# Patient Record
Sex: MALE | Race: BLACK OR AFRICAN AMERICAN | NOT HISPANIC OR LATINO | Employment: UNEMPLOYED | URBAN - METROPOLITAN AREA
[De-identification: names, ages, dates, MRNs, and addresses within clinical notes are randomized per-mention and may not be internally consistent; named-entity substitution may affect disease eponyms.]

---

## 2017-01-20 ENCOUNTER — TELEPHONE (OUTPATIENT)
Dept: SLEEP MEDICINE | Facility: CLINIC | Age: 62
End: 2017-01-20

## 2017-01-20 DIAGNOSIS — G47.33 OSA (OBSTRUCTIVE SLEEP APNEA): Primary | ICD-10-CM

## 2017-01-20 NOTE — TELEPHONE ENCOUNTER
Please let patient know in lab study was denied.  We would like to schedule home sleep study.  Patient should expect a call from sleep lab in 7 working days.

## 2017-01-24 ENCOUNTER — TELEPHONE (OUTPATIENT)
Dept: SLEEP MEDICINE | Facility: OTHER | Age: 62
End: 2017-01-24

## 2017-01-24 DIAGNOSIS — G47.33 OSA (OBSTRUCTIVE SLEEP APNEA): Primary | ICD-10-CM

## 2017-02-03 ENCOUNTER — TELEPHONE (OUTPATIENT)
Dept: SLEEP MEDICINE | Facility: OTHER | Age: 62
End: 2017-02-03

## 2017-02-07 ENCOUNTER — TELEPHONE (OUTPATIENT)
Dept: SLEEP MEDICINE | Facility: OTHER | Age: 62
End: 2017-02-07

## 2017-02-14 ENCOUNTER — TELEPHONE (OUTPATIENT)
Dept: SLEEP MEDICINE | Facility: OTHER | Age: 62
End: 2017-02-14

## 2017-03-15 ENCOUNTER — TELEPHONE (OUTPATIENT)
Dept: SLEEP MEDICINE | Facility: OTHER | Age: 62
End: 2017-03-15

## 2017-03-16 ENCOUNTER — HOSPITAL ENCOUNTER (OUTPATIENT)
Dept: SLEEP MEDICINE | Facility: OTHER | Age: 62
Discharge: HOME | End: 2017-03-16
Attending: INTERNAL MEDICINE
Payer: COMMERCIAL

## 2017-03-16 DIAGNOSIS — G47.33 OSA (OBSTRUCTIVE SLEEP APNEA): ICD-10-CM

## 2017-03-16 PROCEDURE — 95800 SLP STDY UNATTENDED: CPT

## 2017-03-16 PROCEDURE — 95800 SLP STDY UNATTENDED: CPT | Mod: 26,52,, | Performed by: INTERNAL MEDICINE

## 2017-03-24 ENCOUNTER — TELEPHONE (OUTPATIENT)
Dept: SLEEP MEDICINE | Facility: CLINIC | Age: 62
End: 2017-03-24

## 2017-03-24 NOTE — TELEPHONE ENCOUNTER
----- Message from Caleb Bautista MD sent at 3/24/2017  1:11 PM CDT -----  Please schedule sleep clinic appointmetnt with md/np in 1-2 weeks to discuss sleep study result.

## 2017-03-24 NOTE — TELEPHONE ENCOUNTER
LVM for pt to call the clinic back to schedule a appointment with Lily Landon to discuss sleep study results

## 2017-04-11 ENCOUNTER — TELEPHONE (OUTPATIENT)
Dept: INTERNAL MEDICINE | Facility: CLINIC | Age: 62
End: 2017-04-11

## 2017-04-11 DIAGNOSIS — I48.0 PAROXYSMAL ATRIAL FIBRILLATION: ICD-10-CM

## 2017-04-11 DIAGNOSIS — D50.9 MICROCYTIC ANEMIA: Primary | ICD-10-CM

## 2017-04-11 DIAGNOSIS — N52.01 ERECTILE DYSFUNCTION DUE TO ARTERIAL INSUFFICIENCY: ICD-10-CM

## 2017-04-11 DIAGNOSIS — I10 ESSENTIAL HYPERTENSION: ICD-10-CM

## 2017-04-11 RX ORDER — ASPIRIN 81 MG/1
81 TABLET ORAL DAILY
Qty: 30 TABLET | Refills: 2 | Status: SHIPPED | OUTPATIENT
Start: 2017-04-11

## 2017-04-12 RX ORDER — SILDENAFIL 100 MG/1
100 TABLET, FILM COATED ORAL DAILY PRN
Qty: 5 TABLET | Refills: 2 | Status: SHIPPED | OUTPATIENT
Start: 2017-04-12 | End: 2017-06-07

## 2017-04-12 RX ORDER — AMLODIPINE BESYLATE 10 MG/1
10 TABLET ORAL DAILY
Qty: 90 TABLET | Refills: 0 | Status: SHIPPED | OUTPATIENT
Start: 2017-04-12 | End: 2017-06-07

## 2017-04-12 NOTE — TELEPHONE ENCOUNTER
No appointment for >1 year. Filled 3 mo supply of meds, needs to be seen prior to additional refills. Please schedule for EPP annual in 2-3 mo, fasting labs 1 week prior.

## 2017-06-07 ENCOUNTER — LAB VISIT (OUTPATIENT)
Dept: LAB | Facility: HOSPITAL | Age: 62
End: 2017-06-07
Attending: INTERNAL MEDICINE
Payer: COMMERCIAL

## 2017-06-07 ENCOUNTER — OFFICE VISIT (OUTPATIENT)
Dept: INTERNAL MEDICINE | Facility: CLINIC | Age: 62
End: 2017-06-07
Payer: COMMERCIAL

## 2017-06-07 VITALS
HEART RATE: 58 BPM | BODY MASS INDEX: 39.61 KG/M2 | WEIGHT: 267.44 LBS | HEIGHT: 69 IN | SYSTOLIC BLOOD PRESSURE: 180 MMHG | DIASTOLIC BLOOD PRESSURE: 120 MMHG

## 2017-06-07 DIAGNOSIS — I10 ESSENTIAL HYPERTENSION: ICD-10-CM

## 2017-06-07 DIAGNOSIS — R06.09 DYSPNEA ON EXERTION: ICD-10-CM

## 2017-06-07 DIAGNOSIS — Z00.00 ANNUAL PHYSICAL EXAM: Primary | ICD-10-CM

## 2017-06-07 DIAGNOSIS — Z23 NEED FOR TDAP VACCINATION: ICD-10-CM

## 2017-06-07 DIAGNOSIS — R35.0 URINARY FREQUENCY: ICD-10-CM

## 2017-06-07 DIAGNOSIS — D50.9 MICROCYTIC ANEMIA: ICD-10-CM

## 2017-06-07 DIAGNOSIS — G47.33 OSA (OBSTRUCTIVE SLEEP APNEA): ICD-10-CM

## 2017-06-07 DIAGNOSIS — I48.0 PAROXYSMAL ATRIAL FIBRILLATION: ICD-10-CM

## 2017-06-07 LAB
ALBUMIN SERPL BCP-MCNC: 3.6 G/DL
ALP SERPL-CCNC: 86 U/L
ALT SERPL W/O P-5'-P-CCNC: 25 U/L
ANION GAP SERPL CALC-SCNC: 8 MMOL/L
AST SERPL-CCNC: 27 U/L
BACTERIA #/AREA URNS AUTO: ABNORMAL /HPF
BILIRUB SERPL-MCNC: 1 MG/DL
BILIRUB UR QL STRIP: NEGATIVE
BUN SERPL-MCNC: 18 MG/DL
CALCIUM SERPL-MCNC: 9.7 MG/DL
CHLORIDE SERPL-SCNC: 105 MMOL/L
CHOLEST/HDLC SERPL: 4.8 {RATIO}
CLARITY UR REFRACT.AUTO: ABNORMAL
CO2 SERPL-SCNC: 28 MMOL/L
COLOR UR AUTO: YELLOW
COMPLEXED PSA SERPL-MCNC: 4.7 NG/ML
CREAT SERPL-MCNC: 1.3 MG/DL
ERYTHROCYTE [DISTWIDTH] IN BLOOD BY AUTOMATED COUNT: 14.8 %
EST. GFR  (AFRICAN AMERICAN): >60 ML/MIN/1.73 M^2
EST. GFR  (NON AFRICAN AMERICAN): 59.7 ML/MIN/1.73 M^2
FERRITIN SERPL-MCNC: 46 NG/ML
GLUCOSE SERPL-MCNC: 73 MG/DL
GLUCOSE UR QL STRIP: NEGATIVE
HCT VFR BLD AUTO: 46.4 %
HDL/CHOLESTEROL RATIO: 20.9 %
HDLC SERPL-MCNC: 211 MG/DL
HDLC SERPL-MCNC: 44 MG/DL
HGB BLD-MCNC: 15.1 G/DL
HGB UR QL STRIP: NEGATIVE
HYALINE CASTS UR QL AUTO: 4 /LPF
IRON SERPL-MCNC: 54 UG/DL
KETONES UR QL STRIP: NEGATIVE
LDLC SERPL CALC-MCNC: 144.2 MG/DL
LEUKOCYTE ESTERASE UR QL STRIP: ABNORMAL
MCH RBC QN AUTO: 24.4 PG
MCHC RBC AUTO-ENTMCNC: 32.5 %
MCV RBC AUTO: 75 FL
MICROSCOPIC COMMENT: ABNORMAL
NITRITE UR QL STRIP: NEGATIVE
NONHDLC SERPL-MCNC: 167 MG/DL
PH UR STRIP: 5 [PH] (ref 5–8)
PLATELET # BLD AUTO: 274 K/UL
PMV BLD AUTO: 9.8 FL
POTASSIUM SERPL-SCNC: 4.6 MMOL/L
PROT SERPL-MCNC: 7.6 G/DL
PROT UR QL STRIP: ABNORMAL
RBC # BLD AUTO: 6.19 M/UL
RBC #/AREA URNS AUTO: 7 /HPF (ref 0–4)
SATURATED IRON: 11 %
SODIUM SERPL-SCNC: 141 MMOL/L
SP GR UR STRIP: 1.02 (ref 1–1.03)
SQUAMOUS #/AREA URNS AUTO: 1 /HPF
TOTAL IRON BINDING CAPACITY: 500 UG/DL
TRANSFERRIN SERPL-MCNC: 338 MG/DL
TRIGL SERPL-MCNC: 114 MG/DL
URN SPEC COLLECT METH UR: ABNORMAL
UROBILINOGEN UR STRIP-ACNC: NEGATIVE EU/DL
WBC # BLD AUTO: 12.44 K/UL
WBC #/AREA URNS AUTO: 35 /HPF (ref 0–5)

## 2017-06-07 PROCEDURE — 90715 TDAP VACCINE 7 YRS/> IM: CPT | Mod: S$GLB,,, | Performed by: INTERNAL MEDICINE

## 2017-06-07 PROCEDURE — 99396 PREV VISIT EST AGE 40-64: CPT | Mod: S$GLB,,, | Performed by: INTERNAL MEDICINE

## 2017-06-07 PROCEDURE — 81001 URINALYSIS AUTO W/SCOPE: CPT

## 2017-06-07 PROCEDURE — 90471 IMMUNIZATION ADMIN: CPT | Mod: S$GLB,,, | Performed by: INTERNAL MEDICINE

## 2017-06-07 PROCEDURE — 83540 ASSAY OF IRON: CPT

## 2017-06-07 PROCEDURE — 80053 COMPREHEN METABOLIC PANEL: CPT

## 2017-06-07 PROCEDURE — 80061 LIPID PANEL: CPT

## 2017-06-07 PROCEDURE — 36415 COLL VENOUS BLD VENIPUNCTURE: CPT

## 2017-06-07 PROCEDURE — 85027 COMPLETE CBC AUTOMATED: CPT

## 2017-06-07 PROCEDURE — 83036 HEMOGLOBIN GLYCOSYLATED A1C: CPT

## 2017-06-07 PROCEDURE — 99999 PR PBB SHADOW E&M-EST. PATIENT-LVL III: CPT | Mod: PBBFAC,,, | Performed by: INTERNAL MEDICINE

## 2017-06-07 PROCEDURE — 84153 ASSAY OF PSA TOTAL: CPT

## 2017-06-07 PROCEDURE — 82728 ASSAY OF FERRITIN: CPT

## 2017-06-07 RX ORDER — LISINOPRIL AND HYDROCHLOROTHIAZIDE 20; 25 MG/1; MG/1
1 TABLET ORAL DAILY
Qty: 90 TABLET | Refills: 3 | Status: ON HOLD | OUTPATIENT
Start: 2017-06-07 | End: 2023-04-02

## 2017-06-07 NOTE — PROGRESS NOTES
"Subjective:       Patient ID: Juvenal Bernal is a 61 y.o. male.    Chief Complaint: Annual Exam    HPI    Last visit with me 12/2015. Since then seen by General Surgery, Cardiology, and Sleep Med.    Reports when laying down or sitting, sometimes the breathing is hard. Sometimes when sitting up. "It feels like I've got to take an extra breath". No wheezing. Sometimes also when going up stairs, sometimes has to stop to rest. No GERD. For about 2 mo. No palpitations. Never had before. No nausea or vomiting, no diaphoresis. No history of asthma. About 1.5 mo ago was coughing a lot, went on for 2 weeks.    Lately goes to bathroom frequently, every 2.5 hrs at night. Going on for about 2 weeks.    Reviewed PMH, PSH, SH, FH, allergies, and medications.     Review of Systems   All other systems reviewed and are negative.      Objective:      Physical Exam   Constitutional: He is oriented to person, place, and time. No distress.   -American man whose Body mass index is 39.49 kg/m².    HENT:   Head: Atraumatic.   Right Ear: Tympanic membrane normal.   Left Ear: Tympanic membrane normal.   Mouth/Throat: Oropharynx is clear and moist. No oropharyngeal exudate.   Eyes: Pupils are equal, round, and reactive to light. Right eye exhibits no discharge. Left eye exhibits no discharge.   Neck: Normal range of motion. No thyromegaly present.   Cardiovascular: Normal rate and normal heart sounds.  An irregularly irregular rhythm present.   Pulmonary/Chest: Effort normal and breath sounds normal. He has no wheezes. He has no rales.   Abdominal: Soft. He exhibits no distension and no mass. There is no hepatosplenomegaly. There is no tenderness. There is no rigidity, no guarding and negative Calvillo's sign.   Musculoskeletal: He exhibits edema (mild pitting edema to knees in b/L LE). He exhibits no tenderness.   Lymphadenopathy:     He has no cervical adenopathy.   Neurological: He is alert and oriented to person, place, and time. " "  Skin: Skin is warm and dry. No rash noted.   Psychiatric: He has a normal mood and affect. His behavior is normal.   Nursing note and vitals reviewed.      Vitals:    06/07/17 1433 06/07/17 1501   BP: (!) 170/106 (!) 180/120   BP Location: Right arm Right arm   Patient Position: Sitting Sitting   BP Method: Manual Manual   Pulse: (!) 58    Weight: 121.3 kg (267 lb 6.7 oz)    Height: 5' 9" (1.753 m)      Body mass index is 39.49 kg/m².    RESULTS: Reviewed labs from last 18 months. Reviewed stress test 12/2015.    Assessment:       1. Annual physical exam    2. Essential hypertension    3. Urinary frequency    4. Dyspnea on exertion    5. Paroxysmal atrial fibrillation    6. MEHNAZ (obstructive sleep apnea)    7. Need for Tdap vaccination        Plan:   Juvenal was seen today for annual exam.    Diagnoses and all orders for this visit:    Annual physical exam:  Age-appropriate health screening reviewed, indicated tests ordered.     Essential hypertension:  Not at goal, very elevated, start combination antihypertensive. follow up with GAYE Duran in 4 weeks.    Urinary frequency:  Given obesity check for diabetes, may also have prostate enlargement, check urine and PSA today, if symptoms persist perform BRENDAN.  -     Urinalysis  -     Prostate Specific Antigen, Diagnostic; Future  -     Hemoglobin A1c; Future    Dyspnea on exertion:  Going on for last 2 months. Pt has had normal stress test within last 18 mo. Not associated with nausea, vomiting, diaphoresis, or chest pain. Denies GERD. Control blood pressure and obstructive sleep apnea, if symptoms persist will perform additional workup.     Paroxysmal atrial fibrillation:  Most recent EKGs showed resolution of atrial fibrillation. Plan to continue ASA for low CHADS-VASC score, however may need to rescore soon to determine if other anticoagulation indicated.    MEHNAZ (obstructive sleep apnea):  Mod-to-severe based on sleep study, pt hadn't been able to schedule with Sleep " Med yet, refer back to Dr Bautista.    Need for Tdap vaccination  -     Tdap Vaccine    Other orders  -     lisinopril-hydrochlorothiazide (PRINZIDE,ZESTORETIC) 20-25 mg Tab; Take 1 tablet by mouth once daily.    Return in about 4 weeks (around 7/5/2017) for Lena Duran for BP check. See me in 2 months for evaluation; if urinary frequency persists at that time assess for BPH.  Randy Stock MD  Internal Medicine    Portions of this note were completed using [employer] dictation software. Please excuse typographical or syntax errors.

## 2017-06-08 LAB
ESTIMATED AVG GLUCOSE: 120 MG/DL
HBA1C MFR BLD HPLC: 5.8 %

## 2017-06-09 ENCOUNTER — PATIENT MESSAGE (OUTPATIENT)
Dept: INTERNAL MEDICINE | Facility: CLINIC | Age: 62
End: 2017-06-09

## 2017-06-09 ENCOUNTER — TELEPHONE (OUTPATIENT)
Dept: INTERNAL MEDICINE | Facility: CLINIC | Age: 62
End: 2017-06-09

## 2017-06-09 DIAGNOSIS — N39.0 URINARY TRACT INFECTION IN MALE: Primary | ICD-10-CM

## 2017-06-09 RX ORDER — SULFAMETHOXAZOLE AND TRIMETHOPRIM 800; 160 MG/1; MG/1
1 TABLET ORAL 2 TIMES DAILY
Qty: 28 TABLET | Refills: 0 | Status: SHIPPED | OUTPATIENT
Start: 2017-06-09 | End: 2017-06-23

## 2017-06-09 NOTE — TELEPHONE ENCOUNTER
----- Message from Blaine Edwards MA sent at 6/9/2017 11:48 AM CDT -----  Contact: self - [phone]  Type: Returning a call    Who left a message? Tanvi     When did the practice call? 6/9/17     Comments: Please call. Thanks!

## 2017-06-09 NOTE — TELEPHONE ENCOUNTER
Please call the patient to notify that I have sent in an antibiotic to his pharmacy to treatment a urinary tract infection. I have sent the patient a [facility] message. I would like to recheck his blood and urine tests in 3 weeks, please schedule.

## 2017-07-04 ENCOUNTER — HOSPITAL ENCOUNTER (EMERGENCY)
Facility: HOSPITAL | Age: 62
Discharge: HOME | End: 2017-07-04
Attending: FAMILY MEDICINE
Payer: COMMERCIAL

## 2017-07-04 VITALS
SYSTOLIC BLOOD PRESSURE: 115 MMHG | DIASTOLIC BLOOD PRESSURE: 80 MMHG | BODY MASS INDEX: 37.08 KG/M2 | HEART RATE: 69 BPM | TEMPERATURE: 99 F | RESPIRATION RATE: 18 BRPM | WEIGHT: 259 LBS | OXYGEN SATURATION: 98 % | HEIGHT: 70 IN

## 2017-07-04 DIAGNOSIS — K13.70 UNSPECIFIED LESIONS OF ORAL MUCOSA: Primary | ICD-10-CM

## 2017-07-04 DIAGNOSIS — K13.79 ORAL PAIN: ICD-10-CM

## 2017-07-04 PROCEDURE — 99283 EMERGENCY DEPT VISIT LOW MDM: CPT | Mod: ,,, | Performed by: PHYSICIAN ASSISTANT

## 2017-07-04 PROCEDURE — 99283 EMERGENCY DEPT VISIT LOW MDM: CPT | Mod: 25

## 2017-07-04 PROCEDURE — 63600175 PHARM REV CODE 636 W HCPCS: Performed by: PHYSICIAN ASSISTANT

## 2017-07-04 PROCEDURE — 96372 THER/PROPH/DIAG INJ SC/IM: CPT

## 2017-07-04 RX ORDER — METHYLPREDNISOLONE 4 MG/1
TABLET ORAL
Qty: 1 PACKAGE | Refills: 0 | Status: SHIPPED | OUTPATIENT
Start: 2017-07-04 | End: 2017-07-25

## 2017-07-04 RX ORDER — CLINDAMYCIN HYDROCHLORIDE 150 MG/1
300 CAPSULE ORAL EVERY 6 HOURS
Qty: 56 CAPSULE | Refills: 0 | Status: SHIPPED | OUTPATIENT
Start: 2017-07-04 | End: 2017-07-11

## 2017-07-04 RX ORDER — DEXAMETHASONE SODIUM PHOSPHATE 4 MG/ML
8 INJECTION, SOLUTION INTRA-ARTICULAR; INTRALESIONAL; INTRAMUSCULAR; INTRAVENOUS; SOFT TISSUE
Status: COMPLETED | OUTPATIENT
Start: 2017-07-04 | End: 2017-07-04

## 2017-07-04 RX ORDER — TRAMADOL HYDROCHLORIDE 50 MG/1
50 TABLET ORAL EVERY 6 HOURS PRN
Qty: 12 TABLET | Refills: 0 | Status: SHIPPED | OUTPATIENT
Start: 2017-07-04 | End: 2017-07-14

## 2017-07-04 RX ORDER — NYSTATIN 100000 [USP'U]/ML
500000 SUSPENSION ORAL 4 TIMES DAILY
Qty: 200 ML | Refills: 0 | Status: SHIPPED | OUTPATIENT
Start: 2017-07-04 | End: 2017-07-14

## 2017-07-04 RX ADMIN — DEXAMETHASONE SODIUM PHOSPHATE 8 MG: 4 INJECTION, SOLUTION INTRAMUSCULAR; INTRAVENOUS at 02:07

## 2017-07-04 NOTE — ED NOTES
Patient identifiers verified and correct for Juvenal KWON Bernal.    LOC: The patient is awake, alert and aware of environment with an appropriate affect, the patient is oriented x 3 and speaking appropriately.  APPEARANCE: Patient resting comfortably and in no acute distress, patient is clean and well groomed, patient's clothing is properly fastened.  SKIN: The skin is warm and dry, color consistent with ethnicity, patient has normal skin turgor, mouth dry with white spots present on tongue, bottom lip reddened and open with swelling.   MUSCULOSKELETAL: Patient moving all extremities spontaneously, no obvious swelling or deformities noted.  RESPIRATORY: Airway is open and patent, respirations are spontaneous, patient has a normal effort and rate, no accessory muscle use noted  CARDIAC: Patient has a normal rate and regular rhythm, no periphreal edema noted, capillary refill < 3 seconds.  ABDOMEN: Soft and non tender to palpation, no distention noted  NEUROLOGIC:  eyes open spontaneously, behavior appropriate to situation, follows commands, facial expression symmetrical, bilateral hand grasp equal and even, purposeful motor response noted

## 2017-07-04 NOTE — ED PROVIDER NOTES
Encounter Date: 7/4/2017       History     Chief Complaint   Patient presents with    Mouth Lesions     my mouth hurts, teeth and gums , on antibiotics     62 y/o male with history of HTN and Afib presents to the ER with chief complaint left upper gumline and tongue pain x 5 days.  He reports sores on his lips as well.  He went to the [place] ER on 7/1 when his symptoms began.  He was treated for suspected gingivostomatitis with Peridex and penicillin.   He is taking these medications without improvement.  He feels that his pain and swelling of the gums are worsening.  His pain is worse with talking. He is not taking any pain medications.  His pain is rated 9/10.  He is having difficulty eating due to the severe pain with chewing and burning pain with smoothies or other soft foods.    He denies difficulty swallowing, nausea or vomiting, fever or chills. The patient is not scheduled to see a dentist or dermatologist.  He denies additional complaint at this time.              Review of patient's allergies indicates:  No Known Allergies  Past Medical History:   Diagnosis Date    Atrial fibrillation     Hypertension      Past Surgical History:   Procedure Laterality Date    TOTAL HIP ARTHROPLASTY Right 2013     No family history on file.  Social History   Substance Use Topics    Smoking status: Never Smoker    Smokeless tobacco: Never Used    Alcohol use No     Review of Systems   Constitutional: Negative for chills and fever.   HENT: Positive for mouth sores. Negative for facial swelling, sore throat, trouble swallowing and voice change.    Respiratory: Negative for shortness of breath.    Cardiovascular: Negative for chest pain.   Gastrointestinal: Negative for nausea and vomiting.   Genitourinary: Negative for dysuria.   Musculoskeletal: Negative for back pain.   Skin: Positive for rash (involving lower lip and oral mucosa.  no additional rash).   Neurological: Negative for dizziness, syncope, weakness and  light-headedness.   Hematological: Does not bruise/bleed easily.       Physical Exam     Initial Vitals [07/04/17 1230]   BP Pulse Resp Temp SpO2   (!) 157/95 80 16 97.9 °F (36.6 °C) 98 %      MAP       115.67         Physical Exam    Constitutional: He appears well-developed and well-nourished.   HENT:   Head: Atraumatic.   Patient has a white film on the sides and center of his tongue, also along the gumline involving upper and lower gums.  He has multiple superficial apthous ulcers and erythema of the gums especially around the posterior molars.  No fluctuance or drainage from the gums.    Eyes: Conjunctivae and EOM are normal. Pupils are equal, round, and reactive to light.   Neck: Normal range of motion. Neck supple.   Cardiovascular: Normal rate and regular rhythm.   Pulmonary/Chest: Breath sounds normal. No respiratory distress. He has no wheezes. He has no rales.   Abdominal: Soft. Bowel sounds are normal. There is no tenderness.   Neurological: He is alert and oriented to person, place, and time. He has normal strength. No cranial nerve deficit.   Skin: Rash noted.   Lower lip with cracked skin and scant amount of bleeding.  No angioedema.     Psychiatric: He has a normal mood and affect.         ED Course   Procedures  Labs Reviewed - No data to display                APC / Resident Notes:   Patient presents to the ER for evaluation of oral lesions ×5 days.  Patient was seen 3 days ago at a different ER and was treated for suspected gingivostomatitis.  It was noted that he had inflammation and pain to the lips, gums, tongue and throat, but there was no evidence of angioedema.  Patient had a negative strep test at that time.  He was treated with Peridex and penicillin without improvement of his symptoms.  Patient has a white film to the tongue and gums as well as multiple lesions that appear to be apthous ulcers.  He has a history of only hypertension and A. fib and is not known to be immunocompromised.  I  have considered autoimmune process as well as fungal infection.  It is also possible that he does have a dental infection but there is no obvious dental abscess.  We will alter his treatment with addition of steroids and nystatin oral solution.  He is given Decadron in the ER and a prescription for Medrol Dosepak, which he will begin tomorrow.  We will change his antibiotics to clindamycin.  His symptoms began prior to starting the penicillin and I do not suspect ALLERGIC reaction such as angioedema or SJS.   Patient is also given tramadol and Magic mouthwash for pain control.  He is given strict return precautions and advised on her close follow-up with his PCP and dermatology clinic.  The patient is comfortable with this plan.I discussed the care of this patient with my supervising MD.                ED Course     Clinical Impression:   The primary encounter diagnosis was Unspecified lesions of oral mucosa. A diagnosis of Oral pain was also pertinent to this visit.                           LIO Underwood  07/04/17 3883

## 2017-07-04 NOTE — ED NOTES
Patient states his mouth hurts. Last Thursday he had a stinging on tongue and lip and his tongue, lip, and gums began to swell. Patient was given mouthwash and 7 day course of PCN on Saturday in [facility]. Patient states he is not getting any relief of his discomfort.  Patient states he has not been able to eat since Thursday bc of mouth discomfort.

## 2017-07-07 ENCOUNTER — OFFICE VISIT (OUTPATIENT)
Dept: INTERNAL MEDICINE | Facility: CLINIC | Age: 62
End: 2017-07-07
Payer: COMMERCIAL

## 2017-07-07 ENCOUNTER — OFFICE VISIT (OUTPATIENT)
Dept: OTOLARYNGOLOGY | Facility: CLINIC | Age: 62
End: 2017-07-07
Payer: COMMERCIAL

## 2017-07-07 VITALS
DIASTOLIC BLOOD PRESSURE: 87 MMHG | HEART RATE: 64 BPM | WEIGHT: 257.06 LBS | BODY MASS INDEX: 37.96 KG/M2 | SYSTOLIC BLOOD PRESSURE: 149 MMHG

## 2017-07-07 VITALS
SYSTOLIC BLOOD PRESSURE: 138 MMHG | DIASTOLIC BLOOD PRESSURE: 88 MMHG | OXYGEN SATURATION: 98 % | HEART RATE: 72 BPM | HEIGHT: 69 IN | WEIGHT: 256.38 LBS | BODY MASS INDEX: 37.97 KG/M2

## 2017-07-07 DIAGNOSIS — K13.79 MOUTH PAIN: Primary | ICD-10-CM

## 2017-07-07 DIAGNOSIS — I10 ESSENTIAL HYPERTENSION: Primary | ICD-10-CM

## 2017-07-07 DIAGNOSIS — I48.0 PAROXYSMAL ATRIAL FIBRILLATION: ICD-10-CM

## 2017-07-07 PROCEDURE — 99213 OFFICE O/P EST LOW 20 MIN: CPT | Mod: S$GLB,,, | Performed by: NURSE PRACTITIONER

## 2017-07-07 PROCEDURE — 99999 PR PBB SHADOW E&M-EST. PATIENT-LVL III: CPT | Mod: PBBFAC,,, | Performed by: NURSE PRACTITIONER

## 2017-07-07 PROCEDURE — 99203 OFFICE O/P NEW LOW 30 MIN: CPT | Mod: S$GLB,,, | Performed by: OTOLARYNGOLOGY

## 2017-07-07 PROCEDURE — 99999 PR PBB SHADOW E&M-EST. PATIENT-LVL II: CPT | Mod: PBBFAC,,, | Performed by: OTOLARYNGOLOGY

## 2017-07-07 NOTE — LETTER
July 7, 2017      David Yates MD  [address]  [place] [address]           [facility]  [address] [facility] [address]  [place] [address]  Phone: [phone]  Fax: [phone]          Patient: Juvenal Bernal   MR Number: [id number]   YOB: 1955   Date of Visit: 7/7/2017       Dear Dr. David Yates:    Thank you for referring Juvenal Bernal to me for evaluation. Attached you will find relevant portions of my assessment and plan of care.    If you have questions, please do not hesitate to call me. I look forward to following Juvenal Bernal along with you.    Sincerely,    Donato Plascencia MD    Enclosure  CC:  No Recipients    If you would like to receive this communication electronically, please contact [email or web address] or [phone] to request more information on [facility] Link access.    For providers and/or their staff who would like to refer a patient to [facility], please contact us through our one-stop-shop provider referral line, [facility] , at [phone].    If you feel you have received this communication in error or would no longer like to receive these types of communications, please e-mail [email or web address]

## 2017-07-07 NOTE — PROGRESS NOTES
Referring Provider:    David Yates Md  [address] [place] [address]  Subjective:   Patient: Juvenal Bernal [id number], :1955   Visit date:2017 1:27 PM    Chief Complaint:  Oral Pain (eval tongue and lesions )    HPI:  Juvenal is a 61 y.o. male who I was asked to see in consultation for evaluation of the following issue(s):     This is the first time I'm seeing Mr. Bernal.  He reports that last Thursday he was at work and had some chemicals on his hands when he touched his lip.  He began having some burning of the lip and then he thinks he may have moisten his lips with his tongue.  After this began having burning on the tongue followed by blistering on the lips.  He was having difficulty swallowing as well as pain throughout the oral cavity.  He initially went to the emergency room on Saturday but did not improve.  He then returned to the emergency room on Tuesday.  He was prescribed clindamycin, Medrol, nystatin, tramadol and Magic mouthwash.  He reports that his symptoms have entirely resolved.  He no longer is having difficulty swallowing.  The lips have dramatically improved with very mild irritation on the left aspect of the lower lip.  He reports that he has never had a similar incident.  He is feeling well at this point.    Review of Systems:  -     Allergic/Immunologic: has No Known Allergies..  -     Constitutional: Current temp:        His meds, allergies, medical, surgical, social & family histories were reviewed & updated:  -     He has a current medication list which includes the following prescription(s): diphenhydramine hcl, aspirin, clindamycin, lisinopril-hydrochlorothiazide, methylprednisolone, nystatin, and tramadol.  -     He  has a past medical history of Atrial fibrillation and Hypertension.   -     He  does not have any pertinent problems on file.   -     He  has a past surgical history that includes Total hip arthroplasty (Right, ).  -     He  reports that he  has never smoked. He has never used smokeless tobacco. He reports that he does not drink alcohol.  -     His family history is not on file.  -     He has No Known Allergies.    Objective:     Physical Exam:  Vitals:  BP (!) 149/87   Pulse 64   Wt 116.6 kg (257 lb 0.9 oz)   BMI 37.96 kg/m²   General appearance:  Well developed, well nourished    Eyes:  Extraocular motions intact, PERRL    Communication:  no hoarseness, no dysphonia    Ears:  Otoscopy of external auditory canals and tympanic membranes was normal, clinical speech reception thresholds grossly intact, no mass/lesion of auricle.  Nose:  No masses/lesions of external nose, nasal mucosa, septum, and turbinates were within normal limits.  Mouth:  No mass/lesion of lips, teeth, gums, hard/soft palate, tongue, tonsils, or oropharynx. Mild irritation of left lower lip with dry skin healing.     Cardiovascular:  No pedal edema; Radial Pulses +2     Neck & Lymphatics:  No cervical lymphadenopathy, no neck mass/crepitus/ asymmetry, trachea is midline, no thyroid enlargement/tenderness/mass.    Psych: Oriented x3,  Alert with normal mood and affect.     Respiration/Chest:  Symmetric expansion during respiration, normal respiratory effort.    Skin:  Warm and intact. No ulcerations of face, scalp, neck.      Assessment & Plan:   Juvenal was seen today for oral pain.    Diagnoses and all orders for this visit:    Mouth pain      Exam is unremarkable at this point.  History consistent with noxious exposure.      We discussed his medical conditions, treatments and plan.  Juvenal should return to clinic if any issues arise (symptoms worsen or persist), otherwise we will see him back in the clinic only as needed.      Thank you for allowing me to participate in the care of Juvenal.    Donato Plascencia MD

## 2017-07-07 NOTE — PROGRESS NOTES
INTERNAL MEDICINE PROGRESS NOTE    CHIEF COMPLAINT     Chief Complaint   Patient presents with    Follow-up     4 wk BP check        HPI     Juvenal Bernal is a 61 y.o. male who presents for a follow up visit today for blood pressure check.   HTN- was started on lisinopril-hctz at last visit with dr. Stock 6/7/2017. BP was 180/120 at that time. Denies headaches, lightheadedness, dizziness. Denies vision changes. Denies SOB and Chest pain.       Past Medical History:  Past Medical History:   Diagnosis Date    Atrial fibrillation     Hypertension        Home Medications:  Prior to Admission medications    Medication Sig Start Date End Date Taking? Authorizing Provider   (Magic mouthwash) 1:1:1 Benadryl 12.5mg/5ml liq, aluminum & magnesium hydroxide-simehticone (Maalox), lidocaine viscous 2% Swish and spit 5 mLs every 4 (four) hours as needed (pain). for mouth sores 7/4/17   LIO Underwood   aspirin (ECOTRIN) 81 MG EC tablet Take 1 tablet (81 mg total) by mouth once daily. 4/11/17   Chris Holliday MD   clindamycin (CLEOCIN) 150 MG capsule Take 2 capsules (300 mg total) by mouth every 6 (six) hours. 7/4/17 7/11/17  LIO Underwood   lisinopril-hydrochlorothiazide (PRINZIDE,ZESTORETIC) 20-25 mg Tab Take 1 tablet by mouth once daily. 6/7/17 7/7/17  Randy Stock MD   methylPREDNISolone (MEDROL DOSEPACK) 4 mg tablet use as directed 7/4/17 7/25/17  LIO Underwood   nystatin (MYCOSTATIN) 100,000 unit/mL suspension Take 5 mLs (500,000 Units total) by mouth 4 (four) times daily. Swish in the mouth and keep in for as long as possible (several minutes) before swallowing 7/4/17 7/14/17  LIO Underwood   tramadol (ULTRAM) 50 mg tablet Take 1 tablet (50 mg total) by mouth every 6 (six) hours as needed. 7/4/17 7/14/17  LIO Underwood       Review of Systems:  Review of Systems   Constitutional: Negative for appetite change, chills, fatigue, fever and unexpected weight change.   Respiratory:  "Negative for cough and shortness of breath.    Cardiovascular: Negative for chest pain, palpitations and leg swelling.   Gastrointestinal: Negative for abdominal pain, constipation, diarrhea, nausea and vomiting.   Neurological: Negative for dizziness, light-headedness and headaches.       Health Maintainence:   Immunizations:  Health Maintenance       Date Due Completion Date    Influenza Vaccine 08/01/2017 ---    Colonoscopy 04/03/2019 4/3/2009    Lipid Panel 06/07/2022 6/7/2017    TETANUS VACCINE 06/07/2027 6/7/2017           PHYSICAL EXAM     /88   Pulse 72   Ht 5' 9" (1.753 m)   Wt 116.3 kg (256 lb 6.3 oz)   SpO2 98%   BMI 37.86 kg/m²     Physical Exam   Constitutional: He is oriented to person, place, and time. He appears well-developed and well-nourished.   HENT:   Head: Normocephalic and atraumatic.   Eyes: Pupils are equal, round, and reactive to light.   Cardiovascular: Normal rate and regular rhythm.    Pulmonary/Chest: Effort normal.   Neurological: He is alert and oriented to person, place, and time.   Psychiatric: He has a normal mood and affect.   Vitals reviewed.      LABS     Lab Results   Component Value Date    HGBA1C 5.8 06/07/2017     CMP  Sodium   Date Value Ref Range Status   06/07/2017 141 136 - 145 mmol/L Final     Potassium   Date Value Ref Range Status   06/07/2017 4.6 3.5 - 5.1 mmol/L Final     Chloride   Date Value Ref Range Status   06/07/2017 105 95 - 110 mmol/L Final     CO2   Date Value Ref Range Status   06/07/2017 28 23 - 29 mmol/L Final     Glucose   Date Value Ref Range Status   06/07/2017 73 70 - 110 mg/dL Final     BUN, Bld   Date Value Ref Range Status   06/07/2017 18 6 - 20 mg/dL Final     Creatinine   Date Value Ref Range Status   06/07/2017 1.3 0.5 - 1.4 mg/dL Final     Calcium   Date Value Ref Range Status   06/07/2017 9.7 8.7 - 10.5 mg/dL Final     Total Protein   Date Value Ref Range Status   06/07/2017 7.6 6.0 - 8.4 g/dL Final     Albumin   Date Value Ref " Range Status   06/07/2017 3.6 3.5 - 5.2 g/dL Final     Total Bilirubin   Date Value Ref Range Status   06/07/2017 1.0 0.1 - 1.0 mg/dL Final     Comment:     For infants and newborns, interpretation of results should be based  on gestational age, weight and in agreement with clinical  observations.  Premature Infant recommended reference ranges:  Up to 24 hours.............<8.0 mg/dL  Up to 48 hours............<12.0 mg/dL  3-5 days..................<15.0 mg/dL  6-29 days.................<15.0 mg/dL       Alkaline Phosphatase   Date Value Ref Range Status   06/07/2017 86 55 - 135 U/L Final     AST   Date Value Ref Range Status   06/07/2017 27 10 - 40 U/L Final     ALT   Date Value Ref Range Status   06/07/2017 25 10 - 44 U/L Final     Anion Gap   Date Value Ref Range Status   06/07/2017 8 8 - 16 mmol/L Final     eGFR if    Date Value Ref Range Status   06/07/2017 >60.0 >60 mL/min/1.73 m^2 Final     eGFR if non    Date Value Ref Range Status   06/07/2017 59.7 (A) >60 mL/min/1.73 m^2 Final     Comment:     Calculation used to obtain the estimated glomerular filtration  rate (eGFR) is the CKD-EPI equation. Since race is unknown   in our information system, the eGFR values for   -American and Non--American patients are given   for each creatinine result.       Lab Results   Component Value Date    WBC 12.44 06/07/2017    HGB 15.1 06/07/2017    HCT 46.4 06/07/2017    MCV 75 (L) 06/07/2017     06/07/2017     Lab Results   Component Value Date    CHOL 211 (H) 06/07/2017    CHOL 169 01/08/2016    CHOL 178 03/13/2009     Lab Results   Component Value Date    HDL 44 06/07/2017    HDL 40 01/08/2016    HDL 37 (L) 03/13/2009     Lab Results   Component Value Date    LDLCALC 144.2 06/07/2017    LDLCALC 117.6 01/08/2016    LDLCALC 121.2 03/13/2009     Lab Results   Component Value Date    TRIG 114 06/07/2017    TRIG 57 01/08/2016    TRIG 99 03/13/2009     Lab Results   Component  Value Date    CHOLHDL 20.9 06/07/2017    CHOLHDL 23.7 01/08/2016    CHOLHDL 20.8 03/13/2009     Lab Results   Component Value Date    TSH 0.837 01/08/2016       ASSESSMENT/PLAN     Juvenal Bernal is a 61 y.o. male with  Past Medical History:   Diagnosis Date    Atrial fibrillation     Hypertension      Essential hypertension- at goal. Cont lisinopril- HCTZ. Discussed low Na diet and exercise. Cont ambulatory BP monitoring and bring log to next visit.     Paroxysmal atrial fibrillation- rhythm regular and rate controlled today.             Follow up with PCP in 4 weeks as scheduled.     Patient education provided from Darrell. Patient was counseled on when and how to seek emergent care.       Oriana MCCARTY, MARCELA, FNP-c   Department of Internal Medicine - [facility]  8:58 AM

## 2017-08-18 ENCOUNTER — OFFICE VISIT (OUTPATIENT)
Dept: INTERNAL MEDICINE | Facility: CLINIC | Age: 62
End: 2017-08-18
Payer: COMMERCIAL

## 2017-08-18 VITALS
SYSTOLIC BLOOD PRESSURE: 134 MMHG | HEIGHT: 70 IN | BODY MASS INDEX: 37.43 KG/M2 | WEIGHT: 261.44 LBS | DIASTOLIC BLOOD PRESSURE: 86 MMHG | HEART RATE: 74 BPM

## 2017-08-18 DIAGNOSIS — I10 ESSENTIAL HYPERTENSION: Primary | ICD-10-CM

## 2017-08-18 DIAGNOSIS — N52.9 ERECTILE DYSFUNCTION, UNSPECIFIED ERECTILE DYSFUNCTION TYPE: ICD-10-CM

## 2017-08-18 DIAGNOSIS — R97.20 ELEVATED PSA, LESS THAN 10 NG/ML: ICD-10-CM

## 2017-08-18 DIAGNOSIS — R39.11 URINARY HESITANCY: ICD-10-CM

## 2017-08-18 DIAGNOSIS — M79.644 PAIN OF FINGER OF RIGHT HAND: ICD-10-CM

## 2017-08-18 DIAGNOSIS — G47.33 OSA (OBSTRUCTIVE SLEEP APNEA): ICD-10-CM

## 2017-08-18 LAB
BACTERIA #/AREA URNS AUTO: ABNORMAL /HPF
BILIRUB UR QL STRIP: NEGATIVE
CLARITY UR REFRACT.AUTO: ABNORMAL
COLOR UR AUTO: YELLOW
GLUCOSE UR QL STRIP: NEGATIVE
HGB UR QL STRIP: NEGATIVE
KETONES UR QL STRIP: NEGATIVE
LEUKOCYTE ESTERASE UR QL STRIP: ABNORMAL
MICROSCOPIC COMMENT: ABNORMAL
NITRITE UR QL STRIP: NEGATIVE
PH UR STRIP: 5 [PH] (ref 5–8)
PROT UR QL STRIP: NEGATIVE
RBC #/AREA URNS AUTO: 1 /HPF (ref 0–4)
SP GR UR STRIP: 1.02 (ref 1–1.03)
SQUAMOUS #/AREA URNS AUTO: 0 /HPF
URN SPEC COLLECT METH UR: ABNORMAL
UROBILINOGEN UR STRIP-ACNC: NEGATIVE EU/DL
WBC #/AREA URNS AUTO: 16 /HPF (ref 0–5)

## 2017-08-18 PROCEDURE — 3079F DIAST BP 80-89 MM HG: CPT | Mod: S$GLB,,, | Performed by: INTERNAL MEDICINE

## 2017-08-18 PROCEDURE — 87088 URINE BACTERIA CULTURE: CPT

## 2017-08-18 PROCEDURE — 99999 PR PBB SHADOW E&M-EST. PATIENT-LVL III: CPT | Mod: PBBFAC,,, | Performed by: INTERNAL MEDICINE

## 2017-08-18 PROCEDURE — 99214 OFFICE O/P EST MOD 30 MIN: CPT | Mod: S$GLB,,, | Performed by: INTERNAL MEDICINE

## 2017-08-18 PROCEDURE — 87086 URINE CULTURE/COLONY COUNT: CPT

## 2017-08-18 PROCEDURE — 87077 CULTURE AEROBIC IDENTIFY: CPT

## 2017-08-18 PROCEDURE — 3008F BODY MASS INDEX DOCD: CPT | Mod: S$GLB,,, | Performed by: INTERNAL MEDICINE

## 2017-08-18 PROCEDURE — 81001 URINALYSIS AUTO W/SCOPE: CPT

## 2017-08-18 PROCEDURE — 87186 SC STD MICRODIL/AGAR DIL: CPT

## 2017-08-18 PROCEDURE — 3075F SYST BP GE 130 - 139MM HG: CPT | Mod: S$GLB,,, | Performed by: INTERNAL MEDICINE

## 2017-08-18 RX ORDER — SILDENAFIL 100 MG/1
100 TABLET, FILM COATED ORAL DAILY PRN
Qty: 4 TABLET | Refills: 11 | Status: ON HOLD | OUTPATIENT
Start: 2017-08-18 | End: 2023-04-02

## 2017-08-18 NOTE — PROGRESS NOTES
"Subjective:       Patient ID: Juvenal Bernal is a 62 y.o. male.    Chief Complaint: Follow-up    HPI    Patient is accompanied by wife, Sol.    Last visit with me 06/2017. Since then seen in ER for mouth pain, also seen by Internal Medicine and Otolaryngology. Had symptoms consistent with prostatitis, started Bactrim for 2 weeks.    Still with some urinary hesitancy. No dysuria. No foul odor. Urinary frequency has subsided, usually once/night. No fever or chills.     Continues to have symptoms of erectile dysfunction. Started after having hip surgery.  Concern for arterial changes resulting from hip surgery.    occasionally with pain in index finger MCP joint right hand, usually after working with tools. Denies trauma. Pain lasts 10-15 min then resolves.    Review of Systems    As per [facility]    Objective:      Physical Exam   Constitutional: He is oriented to person, place, and time. No distress.   -American man whose Body mass index is 37.52 kg/m².    Cardiovascular: Normal rate, regular rhythm and normal heart sounds.    Pulmonary/Chest: Effort normal and breath sounds normal. He has no wheezes. He has no rales.   Musculoskeletal:   No tenderness to palpation, effusion, or decreased range of motion of MCP index finger right hand.   Neurological: He is alert and oriented to person, place, and time.   Nursing note and vitals reviewed.      Vitals:    08/18/17 0927   BP: 134/86   BP Location: Right arm   Patient Position: Sitting   BP Method: Large (Manual)   Pulse: 74   Weight: 118.6 kg (261 lb 7.5 oz)   Height: 5' 10" (1.778 m)     Body mass index is 37.52 kg/m².    RESULTS: Reviewed labs from last 3 months    Assessment:       1. Essential hypertension    2. Elevated PSA, less than 10 ng/ml    3. Urinary hesitancy    4. MEHNAZ (obstructive sleep apnea)    5. Erectile dysfunction, unspecified erectile dysfunction type    6. Pain of finger of right hand        Plan:   Juvenal was seen today for " follow-up.    Diagnoses and all orders for this visit:    Essential hypertension:  Prior diagnosis, well controlled on current management. No changes at this time, will continue to monitor.   -     Basic metabolic panel; Future    Elevated PSA, less than 10 ng/ml:  Recheck levels, possibly due to prostatitis, will assess for resolution.  -     Prostate Specific Antigen, Diagnostic; Future    Urinary hesitancy:  possibly BPH or prostatitis, assess with blood and urine tests, refer to Urology.  -     Urinalysis  -     Urine culture    MEHNAZ (obstructive sleep apnea):  Needs follow up with Sleep Medicine, ask schedulers to make appointment, or can call on his own.    Erectile dysfunction, unspecified erectile dysfunction type:  possibly arterial insufficiency following surgery, trial of Viagra, counseled about side effects.  Refer to Urology.  -     Testosterone; Future  -     sildenafil (VIAGRA) 100 MG tablet; Take 1 tablet (100 mg total) by mouth daily as needed for Erectile Dysfunction.  -     Ambulatory referral to Urology    Pain of finger of right hand:  osteoarthritis vs tendinitis MCP right index finger, encourage ice and NSAIDs PRN.    Return in about 6 months (around 2/18/2018). Labs and urine today.  Randy Stock MD  Internal Medicine    Portions of this note were completed using [employer] dictation software. Please excuse typographical or syntax errors.

## 2017-08-21 ENCOUNTER — PATIENT MESSAGE (OUTPATIENT)
Dept: INTERNAL MEDICINE | Facility: CLINIC | Age: 62
End: 2017-08-21

## 2017-08-21 DIAGNOSIS — N39.0 URINARY TRACT INFECTION WITHOUT HEMATURIA, SITE UNSPECIFIED: Primary | ICD-10-CM

## 2017-08-21 LAB — BACTERIA UR CULT: NORMAL

## 2017-08-21 RX ORDER — CIPROFLOXACIN 500 MG/1
500 TABLET ORAL 2 TIMES DAILY
Qty: 14 TABLET | Refills: 0 | Status: SHIPPED | OUTPATIENT
Start: 2017-08-21 | End: 2017-08-28

## 2017-08-21 NOTE — TELEPHONE ENCOUNTER
Please call the patient to notify that his urine tests show a urinary tract infection. I am sending in an antibiotic to his pharmacy. Take 1 tab two times a day for 1 week. There are other tests pending on the urine sample, if we need to make any changes I will let him know. All other labs are normal. I have sent the patient a [facility] message.

## 2017-08-24 ENCOUNTER — TELEPHONE (OUTPATIENT)
Dept: INTERNAL MEDICINE | Facility: CLINIC | Age: 62
End: 2017-08-24

## 2017-08-24 NOTE — TELEPHONE ENCOUNTER
----- Message from Josy Torres sent at 8/23/2017  2:18 PM CDT -----  Contact: Self/ [phone]   Type: Returning a call     Who left a message? Tanvi    When did the practice call? Yesterday     Comments: pt stated he miss a call from the office. Pt stated he someone can call him in the morning around 9. Please call and advise     Thank you

## 2017-09-22 ENCOUNTER — OFFICE VISIT (OUTPATIENT)
Dept: UROLOGY | Facility: CLINIC | Age: 62
End: 2017-09-22
Payer: COMMERCIAL

## 2017-09-22 VITALS
BODY MASS INDEX: 37.37 KG/M2 | DIASTOLIC BLOOD PRESSURE: 68 MMHG | WEIGHT: 261 LBS | HEART RATE: 71 BPM | HEIGHT: 70 IN | SYSTOLIC BLOOD PRESSURE: 137 MMHG

## 2017-09-22 DIAGNOSIS — R97.20 ELEVATED PSA: ICD-10-CM

## 2017-09-22 DIAGNOSIS — N20.0 KIDNEY STONE: ICD-10-CM

## 2017-09-22 DIAGNOSIS — N30.00 ACUTE CYSTITIS WITHOUT HEMATURIA: ICD-10-CM

## 2017-09-22 DIAGNOSIS — N52.01 ERECTILE DYSFUNCTION DUE TO ARTERIAL INSUFFICIENCY: Primary | ICD-10-CM

## 2017-09-22 PROBLEM — N39.0 UTI (URINARY TRACT INFECTION): Status: ACTIVE | Noted: 2017-09-22

## 2017-09-22 LAB
BILIRUB SERPL-MCNC: NORMAL MG/DL
BLOOD URINE, POC: NORMAL
COLOR, POC UA: YELLOW
GLUCOSE UR QL STRIP: NORMAL
KETONES UR QL STRIP: NORMAL
LEUKOCYTE ESTERASE URINE, POC: NORMAL
NITRITE, POC UA: NORMAL
PH, POC UA: 5
PROTEIN, POC: NORMAL
SPECIFIC GRAVITY, POC UA: 1.02
UROBILINOGEN, POC UA: NORMAL

## 2017-09-22 PROCEDURE — 87086 URINE CULTURE/COLONY COUNT: CPT

## 2017-09-22 PROCEDURE — 99999 PR PBB SHADOW E&M-EST. PATIENT-LVL IV: CPT | Mod: PBBFAC,,, | Performed by: UROLOGY

## 2017-09-22 PROCEDURE — 99243 OFF/OP CNSLTJ NEW/EST LOW 30: CPT | Mod: 25,S$GLB,, | Performed by: UROLOGY

## 2017-09-22 PROCEDURE — 81002 URINALYSIS NONAUTO W/O SCOPE: CPT | Mod: S$GLB,,, | Performed by: UROLOGY

## 2017-09-22 RX ORDER — CIPROFLOXACIN 250 MG/1
500 TABLET, FILM COATED ORAL ONCE
Status: CANCELLED | OUTPATIENT
Start: 2017-09-22 | End: 2017-09-22

## 2017-09-22 RX ORDER — LIDOCAINE HYDROCHLORIDE 20 MG/ML
JELLY TOPICAL ONCE
Status: CANCELLED | OUTPATIENT
Start: 2017-09-22 | End: 2017-09-22

## 2017-09-22 NOTE — LETTER
September 22, 2017      Randy Stock MD  [address]  [place] [address]           [facility] - Urology 4th Floor  [address]  [place] [address]  Phone: [phone]          Patient: Juvenal Bernal   MR Number: [id number]   YOB: 1955   Date of Visit: 9/22/2017       Dear Dr. Randy Stock:    Thank you for referring Juvenal Bernal to me for evaluation. Attached you will find relevant portions of my assessment and plan of care.    If you have questions, please do not hesitate to call me. I look forward to following Juvenal Bernal along with you.    Sincerely,    Chava Jama MD    Enclosure  CC:  No Recipients    If you would like to receive this communication electronically, please contact [email or web address] or [phone] to request more information on [employer] access.    For providers and/or their staff who would like to refer a patient to [facility], please contact us through our one-stop-shop provider referral line, [employer] , at [phone].    If you feel you have received this communication in error or would no longer like to receive these types of communications, please e-mail [email or web address]

## 2017-09-22 NOTE — PROGRESS NOTES
CC: recurrent UTI    Juvenal Bernal is a 62 y.o. man who is here for the evaluation of Elevated PSA (elevated psa with recent UTI); Urinary Tract Infection (patient is circumsized -states he had a recent UTI -was asymptomatic); Nephrolithiasis (history of stones about 4 years ago); and Erectile Dysfunction (viagra prn )  a new pt referred by his PCP Dr. Beto Stock.  Had UTI back in  and another one in 17 with citrobacter.  He was treated with cipor twice and responded well.    Hx of kidney stone in the past, he was able to pass a stone spontaneously.  Denies any problem with urination.  Reports good flow and complete bladder emptying.  Had circumcision done.  No family hx of prostate cancer.    His PSA was elevated to 4.7 in  but got better to 2.7 in 2017.    Denies flank pain, dysuira, hematuria .      Past Medical History:   Diagnosis Date    Atrial fibrillation     Hypertension      Past Surgical History:   Procedure Laterality Date    TOTAL HIP ARTHROPLASTY Right 2013     Social History   Substance Use Topics    Smoking status: Never Smoker    Smokeless tobacco: Never Used    Alcohol use No     History reviewed. No pertinent family history.  Allergy:  Review of patient's allergies indicates:  No Known Allergies  Outpatient Encounter Prescriptions as of 2017   Medication Sig Dispense Refill    aspirin (ECOTRIN) 81 MG EC tablet Take 1 tablet (81 mg total) by mouth once daily. 30 tablet 2    sildenafil (VIAGRA) 100 MG tablet Take 1 tablet (100 mg total) by mouth daily as needed for Erectile Dysfunction. 4 tablet 11    [] ciprofloxacin HCl (CIPRO) 500 MG tablet Take 1 tablet (500 mg total) by mouth 2 (two) times daily. 14 tablet 0    lisinopril-hydrochlorothiazide (PRINZIDE,ZESTORETIC) 20-25 mg Tab Take 1 tablet by mouth once daily. 90 tablet 3    [DISCONTINUED] (Magic mouthwash) 1:1:1 Benadryl 12.5mg/5ml liq, aluminum & magnesium hydroxide-simehticone (Maalox), lidocaine  viscous 2% Swish and spit 5 mLs every 4 (four) hours as needed (pain). for mouth sores 90 mL 0     No facility-administered encounter medications on file as of 9/22/2017.      Review of Systems   General ROS: GENERAL:  No weight gain or loss  SKIN:  No rashes or lacerations  HEAD:  No headaches  EYES:  No exophthalmos, jaundice or blindness  EARS:  No dizziness, tinnitus or hearing loss  NOSE:  No changes in smell  MOUTH & THROAT:  No dyskinetic movements or obvious goiter  CHEST:  No shortness of breath, hyperventilation or cough  CARDIOVASCULAR:  No tachycardia or chest pain  ABDOMEN:  No nausea, vomiting, pain, constipation or diarrhea  URINARY:  No frequency, dysuria or sexual dysfunction  ENDOCRINE:  No polydipsia, polyuria  MUSCULOSKELETAL:  No pain or stiffness of the joints  NEUROLOGIC:  No weakness, sensory changes, seizures, confusion, memory loss, tremor or other abnormal movements  Physical Exam     Vitals:    09/22/17 0824   BP: 137/68   Pulse: 71     Physical Exam   Constitutional: He is oriented to person, place, and time. He appears well-developed and well-nourished. No distress.   HENT:   Head: Normocephalic and atraumatic.   Right Ear: External ear normal.   Left Ear: External ear normal.   Nose: Nose normal.   Mouth/Throat: Oropharynx is clear and moist.   Eyes: Conjunctivae are normal. Pupils are equal, round, and reactive to light.   Neck: Normal range of motion. Neck supple. No JVD present. No tracheal deviation present. No thyromegaly present.   Cardiovascular: Normal rate, regular rhythm, normal heart sounds and intact distal pulses.  Exam reveals no gallop and no friction rub.    No murmur heard.  Pulmonary/Chest: Effort normal and breath sounds normal. No respiratory distress. He has no wheezes. He exhibits no tenderness.   Abdominal: Soft. Bowel sounds are normal. He exhibits no distension and no mass. There is no tenderness. There is no rebound and no guarding.   Genitourinary: Rectum  normal and penis normal. No penile tenderness.   Genitourinary Comments: Prostate 35 grams with negative nodule or negative tenderness     Musculoskeletal: Normal range of motion. He exhibits no edema, tenderness or deformity.   Lymphadenopathy:     He has no cervical adenopathy.   Neurological: He is alert and oriented to person, place, and time.   Skin: Skin is warm and dry. He is not diaphoretic.     Psychiatric: He has a normal mood and affect. His behavior is normal. Thought content normal.     Genitalia:  Scrotum: no rash or lesion  Normal symmetric epididymis without masses  Normal vas palpated  Normal size, symmetric testicles with no masses   Normal urethral meatus with no discharge  Normal circumcised penis with no lesion   Rectal:  Normal perineum and anus upon inspection.  Normal tone, no masses or tenderness;     LABS:  Lab Results   Component Value Date    PSA 2.7 01/08/2016    PSA 0.57 03/13/2009    PSADIAG 2.7 08/18/2017    PSADIAG 4.7 (H) 06/07/2017     Results for orders placed or performed in visit on 08/18/17   Prostate Specific Antigen, Diagnostic   Result Value Ref Range    PSA DIAGNOSTIC 2.7 0.00 - 4.00 ng/mL   Results for orders placed or performed in visit on 06/07/17   Prostate Specific Antigen, Diagnostic   Result Value Ref Range    PSA DIAGNOSTIC 4.7 (H) 0.00 - 4.00 ng/mL     Lab Results   Component Value Date    CREATININE 1.1 08/18/2017    CREATININE 1.3 06/07/2017    CREATININE 1.1 04/02/2016     Results for orders placed or performed in visit on 08/18/17   Testosterone   Result Value Ref Range    Testosterone, Total 324 195.0 - 1138.0 ng/dL     Urine Culture, Routine   Date Value Ref Range Status   08/18/2017 CITROBACTER KOSERI  >100,000 cfu/ml    Final     UA clear today    Assessment and Plan:  Juvenal was seen today for elevated psa, urinary tract infection, nephrolithiasis and erectile dysfunction.    Diagnoses and all orders for this visit:    Erectile dysfunction due to arterial  insufficiency    Acute cystitis without hematuria  -     Cystoscopy; Future  -     POCT urine dipstick without microscope  -     Urine culture    Elevated PSA    Kidney stone  -     X-Ray Abdomen AP 1 View; Future  -     US Retroperitoneal Limited; Future    Other orders  -     lidocaine HCl 2% urojet; Place into the urethra once.  -     ciprofloxacin HCl tablet 500 mg; Take 2 tablets (500 mg total) by mouth once.    will evaluate his urinary tract system as above.  Will continue to follow up with serial PSA.  His elevated PSA seems related to UTI.    Follow-up:  Return for cysto.

## 2017-09-23 LAB — BACTERIA UR CULT: NO GROWTH

## 2017-10-10 ENCOUNTER — HOSPITAL ENCOUNTER (OUTPATIENT)
Dept: RADIOLOGY | Facility: HOSPITAL | Age: 62
Discharge: HOME | End: 2017-10-10
Attending: UROLOGY
Payer: COMMERCIAL

## 2017-10-10 ENCOUNTER — TELEPHONE (OUTPATIENT)
Dept: UROLOGY | Facility: CLINIC | Age: 62
End: 2017-10-10

## 2017-10-10 ENCOUNTER — HOSPITAL ENCOUNTER (OUTPATIENT)
Dept: UROLOGY | Facility: HOSPITAL | Age: 62
Discharge: HOME | End: 2017-10-10
Attending: UROLOGY
Payer: COMMERCIAL

## 2017-10-10 VITALS
HEART RATE: 65 BPM | HEIGHT: 70 IN | TEMPERATURE: 98 F | SYSTOLIC BLOOD PRESSURE: 152 MMHG | DIASTOLIC BLOOD PRESSURE: 101 MMHG | BODY MASS INDEX: 38.44 KG/M2 | RESPIRATION RATE: 20 BRPM | WEIGHT: 268.5 LBS

## 2017-10-10 DIAGNOSIS — N40.1 BPH WITH URINARY OBSTRUCTION: ICD-10-CM

## 2017-10-10 DIAGNOSIS — N39.0 URINARY TRACT INFECTION WITHOUT HEMATURIA, SITE UNSPECIFIED: ICD-10-CM

## 2017-10-10 DIAGNOSIS — N30.00 ACUTE CYSTITIS WITHOUT HEMATURIA: ICD-10-CM

## 2017-10-10 DIAGNOSIS — N13.8 BPH WITH URINARY OBSTRUCTION: ICD-10-CM

## 2017-10-10 DIAGNOSIS — N20.0 KIDNEY STONE: ICD-10-CM

## 2017-10-10 DIAGNOSIS — N20.0 CALCIUM KIDNEY STONE: Primary | ICD-10-CM

## 2017-10-10 DIAGNOSIS — N20.0 KIDNEY STONE: Primary | ICD-10-CM

## 2017-10-10 PROCEDURE — 74000 XR ABDOMEN AP 1 VIEW: CPT | Mod: 26,,, | Performed by: RADIOLOGY

## 2017-10-10 PROCEDURE — 52000 CYSTOURETHROSCOPY: CPT

## 2017-10-10 PROCEDURE — 76775 US EXAM ABDO BACK WALL LIM: CPT | Mod: TC

## 2017-10-10 PROCEDURE — 74000 XR ABDOMEN AP 1 VIEW: CPT | Mod: TC

## 2017-10-10 PROCEDURE — 76775 US EXAM ABDO BACK WALL LIM: CPT | Mod: 26,,, | Performed by: RADIOLOGY

## 2017-10-10 PROCEDURE — 87086 URINE CULTURE/COLONY COUNT: CPT

## 2017-10-10 PROCEDURE — 52000 CYSTOURETHROSCOPY: CPT | Mod: ,,, | Performed by: UROLOGY

## 2017-10-10 RX ORDER — DUTASTERIDE 0.5 MG/1
0.5 CAPSULE, LIQUID FILLED ORAL DAILY
Qty: 30 CAPSULE | Refills: 11 | Status: ON HOLD | OUTPATIENT
Start: 2017-10-10 | End: 2023-04-02

## 2017-10-10 RX ORDER — LIDOCAINE HYDROCHLORIDE 20 MG/ML
JELLY TOPICAL ONCE
Status: COMPLETED | OUTPATIENT
Start: 2017-10-10 | End: 2017-10-10

## 2017-10-10 RX ORDER — TAMSULOSIN HYDROCHLORIDE 0.4 MG/1
0.4 CAPSULE ORAL NIGHTLY
Qty: 30 CAPSULE | Refills: 11 | Status: SHIPPED | OUTPATIENT
Start: 2017-10-10 | End: 2017-12-22

## 2017-10-10 RX ORDER — TAMSULOSIN HYDROCHLORIDE 0.4 MG/1
0.4 CAPSULE ORAL NIGHTLY
Qty: 30 CAPSULE | Refills: 11 | Status: SHIPPED | OUTPATIENT
Start: 2017-10-10 | End: 2017-10-10 | Stop reason: SDUPTHER

## 2017-10-10 RX ORDER — DUTASTERIDE 0.5 MG/1
0.5 CAPSULE, LIQUID FILLED ORAL DAILY
Qty: 30 CAPSULE | Refills: 11 | Status: SHIPPED | OUTPATIENT
Start: 2017-10-10 | End: 2017-10-10 | Stop reason: SDUPTHER

## 2017-10-10 RX ORDER — CIPROFLOXACIN 500 MG/1
500 TABLET ORAL ONCE
Status: COMPLETED | OUTPATIENT
Start: 2017-10-10 | End: 2017-10-10

## 2017-10-10 RX ADMIN — CIPROFLOXACIN 500 MG: 500 TABLET ORAL at 11:10

## 2017-10-10 RX ADMIN — LIDOCAINE HYDROCHLORIDE: 20 JELLY TOPICAL at 11:10

## 2017-10-10 NOTE — INTERVAL H&P NOTE
The patient has been examined and the H&P has been reviewed:    I concur with the findings and no changes have occurred since H&P was written.  US  Right-sided nonobstructive nephrolithiasis.    Bilateral renal cysts.    Slightly elevated arterial resistive indices, nonspecific.    Hx of recurrent UTI      There are no hospital problems to display for this patient.

## 2017-10-10 NOTE — PATIENT INSTRUCTIONS
What to Expect After a Cystoscopy  For the next 24-48 hours, you may feel a mild burning when you urinate. This burning is normal and expected. Drink plenty of water to dilute the urine to help relieve the burning sensation. You may also see a small amount of blood in your urine after the procedure.    Unless you are already taking antibiotics, you may be given an antibiotic after the test to prevent infection.    Signs and Symptoms to Report  Call the [facility] at [phone] if you develop any of the following:  · Fever of 101 degrees or higher  · Chills or persistent bleeding  · Inability to urinate

## 2017-10-10 NOTE — PROCEDURES
Procedure Date:  10/10/2017      Procedure:  Male Diagnostic Cystourethroscopy    Pre-op diagnosis: recurrent UTI, BPH with obstruction  Post-op diagnosis: same  Anesthesia: Local  Surgeon:  Chava Jama MD    Findings:  Urethra:  Normal urethra.   Sphincter: competent.  Prostate: Estimated Length Prostatic Urethra: 4.5 cm with bilateral lateral obstruction  Bladder neck: patent with no stricture  Bladder:  Normal bladder.   Normal ureteral orifices bilaterally.   Moderate trabeculation.     Description of Procedure:                                                         Informed Consent:                                                            - Risks, benefits and alternatives of procedure discussed with               patient and informed consent obtained.       Patient Position:   - Supine. --- Bladder ---   Prep and Drape:   - Patient prepped and draped in usual sterile fashion using povidone     iodine (Betadine).   Instruments:   - 16 Fr flexible cystoscope with 0 degree lens.   Procedure Details:   - Cystoscope passed under vision into bladder.   - Bladder and urethra examined in their entirety with findings as     above.     Conclusion:  1. BPH with obstruction  Discussed the options of TURP  He is interested in maximizing medical treatment.  Will treat him with avodart and flomax.    2. Right renal stones  For right kidney stone, will recommend right ESWL.  He would like to schedule it near Thanksgiving day.  Will have him talk to my .    Plan:  Patient was discharged home in a stable condition.  Medications: cipro  Follow up:  As per pt.

## 2017-10-10 NOTE — TELEPHONE ENCOUNTER
Diagnoses and all orders for this visit:    Kidney stone  -     Case Request Operating Room: LITHOTRIPSY-EXTRACORPOREAL SHOCK WAVE

## 2017-10-11 LAB — BACTERIA UR CULT: NO GROWTH

## 2017-11-01 ENCOUNTER — ANESTHESIA EVENT (OUTPATIENT)
Dept: SURGERY | Facility: HOSPITAL | Age: 62
End: 2017-11-01
Payer: COMMERCIAL

## 2017-11-01 DIAGNOSIS — Z01.818 PREOP TESTING: Primary | ICD-10-CM

## 2017-11-01 NOTE — ANESTHESIA PREPROCEDURE EVALUATION
Anesthesia Assessment: Preoperative EQUATION     Planned Procedure: Procedure(s) (LRB):  LITHOTRIPSY-EXTRACORPOREAL SHOCK WAVE (Right)  Requested Anesthesia Type:Monitor Anesthesia Care  Surgeon: Chava Jama MD  Service: Urology  Known or anticipated Date of Surgery:11/15/2017     Surgeon notes: reviewed     Electronic QUestionnaire Assessment completed via nurse interview with patient.         No Aq           Triage considerations:      Hx heart murmur  Previous anesthesia records:GETA  Airway (Primary) Placement Date: 04/01/16; Placement Time: 0737; Inserted by: Anesthesia Resident; Airway Device: LMA; Mask Ventilation: Easy - oral; Intubated: Postinduction; Airway Device Size: 5.0 (Attempted 4.0 first); Style: Cuffed; Cuff Inflation: Minimal occlusive pressure; Placement Verified By: Auscultation, Capnometry; Complicating Factors: None; Intubation Findings: Positive EtCO2, Bilateral breath sounds, Atraumatic/Condition of teeth unchanged; Securment: Lips; Complications: None; Breath Sounds: Equal Bilateral; Insertion Attempts: 1; Removal Date: 04/01/16;  Removal Time: 1212 04/01/16 0737 by Srikanth Rider MD         Last [facility]  Subspecialty notes: Cardiology: EP, ENT, sleep medicine     Other important co-morbidities:   Atrial fib  Hypertension  MEHNAZ  Tests already available:  No recent tests.                            Instructions given. (See in Nurse's note)     Optimization:    Medical Opinion Indicated                            Sub-specialist consult indicated:   TBD                                         Plan:    Testing:  CBC and EKG per Dr. Jama   Pre-anesthesia  visit                                        Visit focus: to be determined                           Consultation:PCP notified of surgery                            Navigation: Tests Scheduled.                          Results will be tracked by Preop Clinic.                                                 Plans per surgeon and Follow-up per Surgeon                                                                                                            11/01/2017  Juvenal Bernal is a 62 y.o., male.  Pre-operative evaluation for Procedure(s) (LRB):  LITHOTRIPSY-EXTRACORPOREAL SHOCK WAVE (Right)    Juvenal Bernal is a 62 y.o. male     Patient Active Problem List   Diagnosis    Essential hypertension    Erectile dysfunction due to arterial insufficiency    Heart murmur    Obesity, Class II, BMI 35-39.9, with comorbidity    Paroxysmal atrial fibrillation    Soft tissue mass    Mass of left thigh    Lesion of skeletal muscle    MEHNAZ (obstructive sleep apnea)    UTI (urinary tract infection)    Elevated PSA    Calcium kidney stone    Kidney stone       Review of patient's allergies indicates:  No Known Allergies    No current facility-administered medications on file prior to encounter.      Current Outpatient Prescriptions on File Prior to Encounter   Medication Sig Dispense Refill    aspirin (ECOTRIN) 81 MG EC tablet Take 1 tablet (81 mg total) by mouth once daily. 30 tablet 2    dutasteride (AVODART) 0.5 mg capsule Take 1 capsule (0.5 mg total) by mouth once daily. 30 capsule 11    lisinopril-hydrochlorothiazide (PRINZIDE,ZESTORETIC) 20-25 mg Tab Take 1 tablet by mouth once daily. 90 tablet 3    tamsulosin (FLOMAX) 0.4 mg Cp24 Take 1 capsule (0.4 mg total) by mouth every evening. 30 capsule 11    sildenafil (VIAGRA) 100 MG tablet Take 1 tablet (100 mg total) by mouth daily as needed for Erectile Dysfunction. 4 tablet 11       Past Surgical History:   Procedure Laterality Date    TOTAL HIP ARTHROPLASTY Right 2013       Social History     Social History    Marital status:      Spouse name: N/A    Number of children: N/A    Years of education: N/A     Occupational History    Not on file.     Social History Main Topics    Smoking status: Never Smoker    Smokeless  tobacco: Never Used    Alcohol use No    Drug use: Unknown    Sexual activity: Yes     Partners: Female     Other Topics Concern    Not on file     Social History Narrative    No narrative on file         Vital Signs Range (Last 24H):  Temp:  [36.8 °C (98.2 °F)]   Pulse:  [68]   Resp:  [18]   BP: (159)/(92)   SpO2:  [98 %]       CBC: No results for input(s): WBC, RBC, HGB, HCT, PLT, MCV, MCH, MCHC in the last 72 hours.    CMP: No results for input(s): NA, K, CL, CO2, BUN, CREATININE, GLU, MG, PHOS, CALCIUM, ALBUMIN, PROT, ALKPHOS, ALT, AST, BILITOT in the last 72 hours.    INR  No results for input(s): INR, PROTIME, APTT in the last 72 hours.    Invalid input(s): PT        Diagnostic Studies:      EKD Echo:      Anesthesia Evaluation    I have reviewed the Patient Summary Reports.    I have reviewed the Nursing Notes.   I have reviewed the Medications.     Review of Systems  Anesthesia Hx:  No problems with previous Anesthesia  History of prior surgery of interest to airway management or planning: Previous anesthesia: General Denies Family Hx of Anesthesia complications.   Denies Personal Hx of Anesthesia complications.   Social:  Former Smoker    Hematology/Oncology:  Hematology Normal   Oncology Normal     EENT/Dental:EENT/Dental Normal   Cardiovascular:   Exercise tolerance: good Hypertension Dysrhythmias    Pulmonary:   Sleep Apnea    Renal/:   Chronic Renal Disease    Hepatic/GI:  Hepatic/GI Normal    Musculoskeletal:   Arthritis  Occasional swelling in knee   Neurological:  Neurology Normal    Endocrine:  Endocrine Normal Hypothyroidism: paroxysmal atrial  fibrillation.    Dermatological:  Skin Normal    Psych:  Psychiatric Normal           Physical Exam  General:  Obesity    Airway/Jaw/Neck:  Airway Findings: Mouth Opening: Normal Tongue: Normal  Mallampati: II  TM Distance: Normal, at least 6 cm      Dental:  Dental Findings: In tact   Chest/Lungs:  Chest/Lungs Findings: Clear to  auscultation, Normal Respiratory Rate     Heart/Vascular:  Heart Findings: Rate: Normal  Rhythm: Regular Rhythm  Sounds: Normal             Anesthesia Plan  Type of Anesthesia, risks & benefits discussed:  Anesthesia Type:  general  Patient's Preference:   Intra-op Monitoring Plan: standard ASA monitors  Intra-op Monitoring Plan Comments:   Post Op Pain Control Plan:   Post Op Pain Control Plan Comments:   Induction:   IV  Beta Blocker:  Patient is not currently on a Beta-Blocker (No further documentation required).       Informed Consent: Patient understands risks and agrees with Anesthesia plan.  Questions answered.   ASA Score: 3     Day of Surgery Review of History & Physical:            Ready For Surgery From Anesthesia Perspective.

## 2017-11-01 NOTE — PRE ADMISSION SCREENING
Anesthesia Assessment: Preoperative EQUATION    Planned Procedure: Procedure(s) (LRB):  LITHOTRIPSY-EXTRACORPOREAL SHOCK WAVE (Right)  Requested Anesthesia Type:Monitor Anesthesia Care  Surgeon: Chava Jama MD  Service: Urology  Known or anticipated Date of Surgery:11/15/2017    Surgeon notes: reviewed    Electronic QUestionnaire Assessment completed via nurse interview with patient.        No Aq        Triage considerations:     Hx heart murmur  Previous anesthesia records:GETA  Airway (Primary) Placement Date: 04/01/16; Placement Time: 0737; Inserted by: Anesthesia Resident; Airway Device: LMA; Mask Ventilation: Easy - oral; Intubated: Postinduction; Airway Device Size: 5.0 (Attempted 4.0 first); Style: Cuffed; Cuff Inflation: Minimal occlusive pressure; Placement Verified By: Auscultation, Capnometry; Complicating Factors: None; Intubation Findings: Positive EtCO2, Bilateral breath sounds, Atraumatic/Condition of teeth unchanged; Securment: Lips; Complications: None; Breath Sounds: Equal Bilateral; Insertion Attempts: 1; Removal Date: 04/01/16;  Removal Time: 1212 04/01/16 0737 by Srikanth Rider MD       Last PCP note: within 3 months , within [facility]  Dr. Stock  Subspecialty notes: Cardiology: EP, ENT, sleep medicine    Other important co-morbidities:   Atrial fib  Hypertension  MEHNAZ  Tests already available:  No recent tests.            Instructions given. (See in Nurse's note)    Optimization:    Medical Opinion Indicated       Sub-specialist consult indicated:   TBD           Plan:    Testing:  CBC and EKG per Dr. Jama   Pre-anesthesia  visit       Visit focus: to be determined     Consultation:PCP notified of surgery      Navigation: Tests Scheduled.               Results will be tracked by Preop Clinic.                           Plans per surgeon and Follow-up per Surgeon

## 2017-11-01 NOTE — PRE-PROCEDURE INSTRUCTIONS
preop instructions given and patient verbalizes understanding. Sending instructions thru My Chart  Messaged Dr Holliday to ask about ASA

## 2017-11-02 NOTE — PRE-PROCEDURE INSTRUCTIONS
MD Leena De Jesus RN          Yes not an issue    Previous Messages      ----- Message -----   From: Leena Martin RN   Sent: 11/1/2017  10:50 AM   To: Chris Holliday MD     Pt seen by you in September 2016 , He is scheduled for lithotripsy- extracorporeal shock wave by Dr. Jama 11/15. He is on ASA, may he stop for procedure.       Mark TOMPKINS BC   Pre-op anesthesia

## 2017-11-10 ENCOUNTER — HOSPITAL ENCOUNTER (OUTPATIENT)
Dept: CARDIOLOGY | Facility: CLINIC | Age: 62
Discharge: HOME | End: 2017-11-10
Payer: COMMERCIAL

## 2017-11-10 ENCOUNTER — LAB VISIT (OUTPATIENT)
Dept: LAB | Facility: HOSPITAL | Age: 62
End: 2017-11-10
Attending: UROLOGY
Payer: COMMERCIAL

## 2017-11-10 ENCOUNTER — OFFICE VISIT (OUTPATIENT)
Dept: UROLOGY | Facility: CLINIC | Age: 62
End: 2017-11-10
Payer: COMMERCIAL

## 2017-11-10 DIAGNOSIS — N20.0 CALCIUM KIDNEY STONE: ICD-10-CM

## 2017-11-10 DIAGNOSIS — N20.0 KIDNEY STONE: Primary | ICD-10-CM

## 2017-11-10 LAB
BASOPHILS # BLD AUTO: 0.07 K/UL
BASOPHILS NFR BLD: 0.6 %
DIFFERENTIAL METHOD: ABNORMAL
EOSINOPHIL # BLD AUTO: 0.5 K/UL
EOSINOPHIL NFR BLD: 4.7 %
ERYTHROCYTE [DISTWIDTH] IN BLOOD BY AUTOMATED COUNT: 13.9 %
HCT VFR BLD AUTO: 43.6 %
HGB BLD-MCNC: 14.3 G/DL
IMM GRANULOCYTES # BLD AUTO: 0.05 K/UL
IMM GRANULOCYTES NFR BLD AUTO: 0.4 %
LYMPHOCYTES # BLD AUTO: 3.1 K/UL
LYMPHOCYTES NFR BLD: 26.8 %
MCH RBC QN AUTO: 25.3 PG
MCHC RBC AUTO-ENTMCNC: 32.8 G/DL
MCV RBC AUTO: 77 FL
MONOCYTES # BLD AUTO: 1.2 K/UL
MONOCYTES NFR BLD: 10.3 %
NEUTROPHILS # BLD AUTO: 6.6 K/UL
NEUTROPHILS NFR BLD: 57.2 %
NRBC BLD-RTO: 0 /100 WBC
PLATELET # BLD AUTO: 309 K/UL
PMV BLD AUTO: 10 FL
RBC # BLD AUTO: 5.65 M/UL
WBC # BLD AUTO: 11.52 K/UL

## 2017-11-10 PROCEDURE — 93000 ELECTROCARDIOGRAM COMPLETE: CPT | Mod: S$GLB,,, | Performed by: INTERNAL MEDICINE

## 2017-11-10 PROCEDURE — 99999 PR PBB SHADOW E&M-EST. PATIENT-LVL II: CPT | Mod: PBBFAC,,,

## 2017-11-10 PROCEDURE — 36415 COLL VENOUS BLD VENIPUNCTURE: CPT

## 2017-11-10 PROCEDURE — 87086 URINE CULTURE/COLONY COUNT: CPT

## 2017-11-10 PROCEDURE — 99499 UNLISTED E&M SERVICE: CPT | Mod: S$GLB,,, | Performed by: UROLOGY

## 2017-11-10 PROCEDURE — 85025 COMPLETE CBC W/AUTO DIFF WBC: CPT

## 2017-11-10 NOTE — PROGRESS NOTES
Urology ([facility]) H&P  Staff: Chava Jama MD    Is this patient in a research study?  No      HPI:  Juvenal Bernal is a 62 y.o. male with BPH, ED, and nephrolithiasis who presents today for management of his right lower pole kidney stone. He has had stones before but has always passed them. He has not had any surgery for stones. He experiences periodic right flank and abdominal pain. No dysuria or hematuria. Does have nocturia, frequency, and incomplete emptying. No recent fevers.     ROS:  Neg except per HPI    Past Medical History:   Diagnosis Date    Anticoagulant long-term use     Arthritis     knee    Atrial fibrillation     Hypertension        Past Surgical History:   Procedure Laterality Date    TOTAL HIP ARTHROPLASTY Right 2013       Social History     Social History    Marital status:      Spouse name: N/A    Number of children: N/A    Years of education: N/A     Social History Main Topics    Smoking status: Never Smoker    Smokeless tobacco: Never Used    Alcohol use No    Drug use: Unknown    Sexual activity: Yes     Partners: Female     Other Topics Concern    Not on file     Social History Narrative    No narrative on file       No family history on file.    Review of patient's allergies indicates:  No Known Allergies    Current Outpatient Prescriptions on File Prior to Visit   Medication Sig Dispense Refill    aspirin (ECOTRIN) 81 MG EC tablet Take 1 tablet (81 mg total) by mouth once daily. 30 tablet 2    dutasteride (AVODART) 0.5 mg capsule Take 1 capsule (0.5 mg total) by mouth once daily. 30 capsule 11    lisinopril-hydrochlorothiazide (PRINZIDE,ZESTORETIC) 20-25 mg Tab Take 1 tablet by mouth once daily. 90 tablet 3    sildenafil (VIAGRA) 100 MG tablet Take 1 tablet (100 mg total) by mouth daily as needed for Erectile Dysfunction. 4 tablet 11    tamsulosin (FLOMAX) 0.4 mg Cp24 Take 1 capsule (0.4 mg total) by mouth every evening. 30 capsule 11     No current  facility-administered medications on file prior to visit.        Anticoagulation:  Yes asa, will stop 1 week prior    Physical Exam:  BMI 37.4    AAOx4, NAD, WDWN  NC/AT, EOMI, PER, sclerae anicteric, speech normal, tongue midline  Nl effort, CTAB  RRR  Soft, non-tender, non-distended      Labs:    Urine dipstick today - urine culture sent today    Lab Results   Component Value Date    WBC 11.52 11/10/2017    HGB 14.3 11/10/2017    HCT 43.6 11/10/2017    MCV 77 (L) 11/10/2017     11/10/2017       BMP  Lab Results   Component Value Date     08/18/2017    K 4.6 08/18/2017     08/18/2017    CO2 31 (H) 08/18/2017    BUN 23 (H) 08/18/2017    CREATININE 1.1 08/18/2017    CALCIUM 9.3 08/18/2017    ANIONGAP 6 (L) 08/18/2017    ESTGFRAFRICA >60.0 08/18/2017    EGFRNONAA >60.0 08/18/2017       Lab Results   Component Value Date    PSA 2.7 01/08/2016    PSA 0.57 03/13/2009       Imaging:  KUB: right lower pole stone, 1 cm    Assessment: Juvenal Bernal is a 62 y.o. male with right lower pole kidney stone    Plan:     1. To OR on 11/22/17 for right ESWL  2. Consents signed   3. I have explained the risk, benefits, and alternatives of the procedure in detail. The patient voices understanding and all questions have been answered. The patient agrees to proceed as planned.   4. CBC, Urine culture, EKG today    Moses Suarez MD

## 2017-11-11 LAB — BACTERIA UR CULT: NO GROWTH

## 2017-11-15 ENCOUNTER — ANESTHESIA (OUTPATIENT)
Dept: SURGERY | Facility: HOSPITAL | Age: 62
End: 2017-11-15
Payer: COMMERCIAL

## 2017-11-21 ENCOUNTER — TELEPHONE (OUTPATIENT)
Dept: UROLOGY | Facility: CLINIC | Age: 62
End: 2017-11-21

## 2017-11-21 NOTE — TELEPHONE ENCOUNTER
Called pt's wife to confirm 6:30am arrival time for procedure. Gave pt's wife NPO instructions and gave pt's wife opportunity to ask questions. Pt's wife verbalized understanding.

## 2017-11-22 ENCOUNTER — TELEPHONE (OUTPATIENT)
Dept: UROLOGY | Facility: CLINIC | Age: 62
End: 2017-11-22

## 2017-11-22 ENCOUNTER — HOSPITAL ENCOUNTER (OUTPATIENT)
Facility: HOSPITAL | Age: 62
Discharge: HOME | End: 2017-11-22
Attending: UROLOGY | Admitting: UROLOGY
Payer: COMMERCIAL

## 2017-11-22 VITALS
SYSTOLIC BLOOD PRESSURE: 145 MMHG | HEART RATE: 60 BPM | HEIGHT: 70 IN | OXYGEN SATURATION: 100 % | RESPIRATION RATE: 18 BRPM | TEMPERATURE: 98 F | WEIGHT: 252 LBS | BODY MASS INDEX: 36.08 KG/M2 | DIASTOLIC BLOOD PRESSURE: 95 MMHG

## 2017-11-22 DIAGNOSIS — N20.0 RIGHT KIDNEY STONE: Primary | ICD-10-CM

## 2017-11-22 DIAGNOSIS — N20.0 KIDNEY STONE: ICD-10-CM

## 2017-11-22 PROCEDURE — 36000704 HC OR TIME LEV I 1ST 15 MIN: Performed by: UROLOGY

## 2017-11-22 PROCEDURE — 25000003 PHARM REV CODE 250: Performed by: UROLOGY

## 2017-11-22 PROCEDURE — 25000003 PHARM REV CODE 250: Performed by: NURSE ANESTHETIST, CERTIFIED REGISTERED

## 2017-11-22 PROCEDURE — 71000015 HC POSTOP RECOV 1ST HR: Performed by: UROLOGY

## 2017-11-22 PROCEDURE — 63600175 PHARM REV CODE 636 W HCPCS: Performed by: NURSE ANESTHETIST, CERTIFIED REGISTERED

## 2017-11-22 PROCEDURE — 71000033 HC RECOVERY, INTIAL HOUR: Performed by: UROLOGY

## 2017-11-22 PROCEDURE — D9220A PRA ANESTHESIA: Mod: CRNA,,, | Performed by: NURSE ANESTHETIST, CERTIFIED REGISTERED

## 2017-11-22 PROCEDURE — 50590 FRAGMENTING OF KIDNEY STONE: CPT | Mod: RT,,, | Performed by: UROLOGY

## 2017-11-22 PROCEDURE — 37000008 HC ANESTHESIA 1ST 15 MINUTES: Performed by: UROLOGY

## 2017-11-22 PROCEDURE — 63600175 PHARM REV CODE 636 W HCPCS: Performed by: STUDENT IN AN ORGANIZED HEALTH CARE EDUCATION/TRAINING PROGRAM

## 2017-11-22 PROCEDURE — 37000009 HC ANESTHESIA EA ADD 15 MINS: Performed by: UROLOGY

## 2017-11-22 PROCEDURE — 36000705 HC OR TIME LEV I EA ADD 15 MIN: Performed by: UROLOGY

## 2017-11-22 PROCEDURE — D9220A PRA ANESTHESIA: Mod: ANES,,, | Performed by: ANESTHESIOLOGY

## 2017-11-22 RX ORDER — SODIUM CHLORIDE 9 MG/ML
INJECTION, SOLUTION INTRAVENOUS CONTINUOUS
Status: DISCONTINUED | OUTPATIENT
Start: 2017-11-22 | End: 2017-11-22 | Stop reason: HOSPADM

## 2017-11-22 RX ORDER — GLYCOPYRROLATE 0.2 MG/ML
INJECTION INTRAMUSCULAR; INTRAVENOUS
Status: DISCONTINUED | OUTPATIENT
Start: 2017-11-22 | End: 2017-11-22

## 2017-11-22 RX ORDER — LIDOCAINE HCL/PF 100 MG/5ML
SYRINGE (ML) INTRAVENOUS
Status: DISCONTINUED | OUTPATIENT
Start: 2017-11-22 | End: 2017-11-22

## 2017-11-22 RX ORDER — FENTANYL CITRATE 50 UG/ML
INJECTION, SOLUTION INTRAMUSCULAR; INTRAVENOUS
Status: DISCONTINUED | OUTPATIENT
Start: 2017-11-22 | End: 2017-11-22

## 2017-11-22 RX ORDER — POLYETHYLENE GLYCOL 3350 17 G/17G
17 POWDER, FOR SOLUTION ORAL DAILY
Qty: 30 PACKET | Refills: 0 | Status: ON HOLD | OUTPATIENT
Start: 2017-11-22 | End: 2023-04-02

## 2017-11-22 RX ORDER — CEFAZOLIN SODIUM 2 G/50ML
2 SOLUTION INTRAVENOUS
Status: COMPLETED | OUTPATIENT
Start: 2017-11-22 | End: 2017-11-22

## 2017-11-22 RX ORDER — SODIUM CHLORIDE 9 MG/ML
INJECTION, SOLUTION INTRAVENOUS CONTINUOUS PRN
Status: DISCONTINUED | OUTPATIENT
Start: 2017-11-22 | End: 2017-11-22

## 2017-11-22 RX ORDER — HYDROMORPHONE HYDROCHLORIDE 1 MG/ML
0.2 INJECTION, SOLUTION INTRAMUSCULAR; INTRAVENOUS; SUBCUTANEOUS EVERY 5 MIN PRN
Status: DISCONTINUED | OUTPATIENT
Start: 2017-11-22 | End: 2017-11-22 | Stop reason: HOSPADM

## 2017-11-22 RX ORDER — KETAMINE HCL IN 0.9 % NACL 50 MG/5 ML
SYRINGE (ML) INTRAVENOUS
Status: DISCONTINUED | OUTPATIENT
Start: 2017-11-22 | End: 2017-11-22

## 2017-11-22 RX ORDER — ONDANSETRON 8 MG/1
8 TABLET, ORALLY DISINTEGRATING ORAL EVERY 8 HOURS PRN
Status: DISCONTINUED | OUTPATIENT
Start: 2017-11-22 | End: 2017-11-22 | Stop reason: HOSPADM

## 2017-11-22 RX ORDER — SODIUM CHLORIDE 0.9 % (FLUSH) 0.9 %
3 SYRINGE (ML) INJECTION
Status: DISCONTINUED | OUTPATIENT
Start: 2017-11-22 | End: 2017-11-22 | Stop reason: HOSPADM

## 2017-11-22 RX ORDER — HYDROCODONE BITARTRATE AND ACETAMINOPHEN 5; 325 MG/1; MG/1
1 TABLET ORAL EVERY 4 HOURS PRN
Status: DISCONTINUED | OUTPATIENT
Start: 2017-11-22 | End: 2017-11-22 | Stop reason: HOSPADM

## 2017-11-22 RX ORDER — TAMSULOSIN HYDROCHLORIDE 0.4 MG/1
0.4 CAPSULE ORAL DAILY
Qty: 30 CAPSULE | Refills: 0 | Status: SHIPPED | OUTPATIENT
Start: 2017-11-22 | End: 2017-12-22 | Stop reason: SDUPTHER

## 2017-11-22 RX ORDER — MIDAZOLAM HYDROCHLORIDE 1 MG/ML
INJECTION, SOLUTION INTRAMUSCULAR; INTRAVENOUS
Status: DISCONTINUED | OUTPATIENT
Start: 2017-11-22 | End: 2017-11-22

## 2017-11-22 RX ORDER — OXYCODONE AND ACETAMINOPHEN 5; 325 MG/1; MG/1
1 TABLET ORAL EVERY 4 HOURS PRN
Qty: 31 TABLET | Refills: 0 | Status: SHIPPED | OUTPATIENT
Start: 2017-11-22 | End: 2017-12-22

## 2017-11-22 RX ORDER — PROPOFOL 10 MG/ML
VIAL (ML) INTRAVENOUS CONTINUOUS PRN
Status: DISCONTINUED | OUTPATIENT
Start: 2017-11-22 | End: 2017-11-22

## 2017-11-22 RX ADMIN — FENTANYL CITRATE 25 MCG: 50 INJECTION, SOLUTION INTRAMUSCULAR; INTRAVENOUS at 08:11

## 2017-11-22 RX ADMIN — Medication 20 MG: at 08:11

## 2017-11-22 RX ADMIN — LIDOCAINE HYDROCHLORIDE 50 MG: 20 INJECTION, SOLUTION INTRAVENOUS at 08:11

## 2017-11-22 RX ADMIN — SODIUM CHLORIDE: 0.9 INJECTION, SOLUTION INTRAVENOUS at 08:11

## 2017-11-22 RX ADMIN — SODIUM CHLORIDE 1000 ML: 0.9 INJECTION, SOLUTION INTRAVENOUS at 07:11

## 2017-11-22 RX ADMIN — CEFAZOLIN SODIUM 2 G: 2 SOLUTION INTRAVENOUS at 08:11

## 2017-11-22 RX ADMIN — GLYCOPYRROLATE 0.2 MG: 0.2 INJECTION, SOLUTION INTRAMUSCULAR; INTRAVENOUS at 08:11

## 2017-11-22 RX ADMIN — FENTANYL CITRATE 50 MCG: 50 INJECTION, SOLUTION INTRAMUSCULAR; INTRAVENOUS at 08:11

## 2017-11-22 RX ADMIN — SODIUM CHLORIDE: 0.9 INJECTION, SOLUTION INTRAVENOUS at 07:11

## 2017-11-22 RX ADMIN — PROPOFOL 50 MCG/KG/MIN: 10 INJECTION, EMULSION INTRAVENOUS at 08:11

## 2017-11-22 RX ADMIN — MIDAZOLAM HYDROCHLORIDE 2 MG: 1 INJECTION, SOLUTION INTRAMUSCULAR; INTRAVENOUS at 08:11

## 2017-11-22 NOTE — TELEPHONE ENCOUNTER
Diagnoses and all orders for this visit:    Right kidney stone  -     X-Ray Abdomen AP 1 View; Future    post op ESWL in 1 month with KUB please.

## 2017-11-22 NOTE — INTERVAL H&P NOTE
The patient has been examined and the H&P has been reviewed:    I concur with the findings and no changes have occurred since H&P was written.    Anesthesia/Surgery risks, benefits and alternative options discussed and understood by patient/family.          Active Hospital Problems    Diagnosis  POA    Kidney stone [N20.0]  Yes      Resolved Hospital Problems    Diagnosis Date Resolved POA   No resolved problems to display.

## 2017-11-22 NOTE — H&P (VIEW-ONLY)
Urology ([facility]) H&P  Staff: Chava Jama MD    Is this patient in a research study?  No      HPI:  Juvenal Bernal is a 62 y.o. male with BPH, ED, and nephrolithiasis who presents today for management of his right lower pole kidney stone. He has had stones before but has always passed them. He has not had any surgery for stones. He experiences periodic right flank and abdominal pain. No dysuria or hematuria. Does have nocturia, frequency, and incomplete emptying. No recent fevers.     ROS:  Neg except per HPI    Past Medical History:   Diagnosis Date    Anticoagulant long-term use     Arthritis     knee    Atrial fibrillation     Hypertension        Past Surgical History:   Procedure Laterality Date    TOTAL HIP ARTHROPLASTY Right 2013       Social History     Social History    Marital status:      Spouse name: N/A    Number of children: N/A    Years of education: N/A     Social History Main Topics    Smoking status: Never Smoker    Smokeless tobacco: Never Used    Alcohol use No    Drug use: Unknown    Sexual activity: Yes     Partners: Female     Other Topics Concern    Not on file     Social History Narrative    No narrative on file       No family history on file.    Review of patient's allergies indicates:  No Known Allergies    Current Outpatient Prescriptions on File Prior to Visit   Medication Sig Dispense Refill    aspirin (ECOTRIN) 81 MG EC tablet Take 1 tablet (81 mg total) by mouth once daily. 30 tablet 2    dutasteride (AVODART) 0.5 mg capsule Take 1 capsule (0.5 mg total) by mouth once daily. 30 capsule 11    lisinopril-hydrochlorothiazide (PRINZIDE,ZESTORETIC) 20-25 mg Tab Take 1 tablet by mouth once daily. 90 tablet 3    sildenafil (VIAGRA) 100 MG tablet Take 1 tablet (100 mg total) by mouth daily as needed for Erectile Dysfunction. 4 tablet 11    tamsulosin (FLOMAX) 0.4 mg Cp24 Take 1 capsule (0.4 mg total) by mouth every evening. 30 capsule 11     No current  facility-administered medications on file prior to visit.        Anticoagulation:  Yes asa, will stop 1 week prior    Physical Exam:  BMI 37.4    AAOx4, NAD, WDWN  NC/AT, EOMI, PER, sclerae anicteric, speech normal, tongue midline  Nl effort, CTAB  RRR  Soft, non-tender, non-distended      Labs:    Urine dipstick today - urine culture sent today    Lab Results   Component Value Date    WBC 11.52 11/10/2017    HGB 14.3 11/10/2017    HCT 43.6 11/10/2017    MCV 77 (L) 11/10/2017     11/10/2017       BMP  Lab Results   Component Value Date     08/18/2017    K 4.6 08/18/2017     08/18/2017    CO2 31 (H) 08/18/2017    BUN 23 (H) 08/18/2017    CREATININE 1.1 08/18/2017    CALCIUM 9.3 08/18/2017    ANIONGAP 6 (L) 08/18/2017    ESTGFRAFRICA >60.0 08/18/2017    EGFRNONAA >60.0 08/18/2017       Lab Results   Component Value Date    PSA 2.7 01/08/2016    PSA 0.57 03/13/2009       Imaging:  KUB: right lower pole stone, 1 cm    Assessment: Juvenal Bernal is a 62 y.o. male with right lower pole kidney stone    Plan:     1. To OR on 11/22/17 for right ESWL  2. Consents signed   3. I have explained the risk, benefits, and alternatives of the procedure in detail. The patient voices understanding and all questions have been answered. The patient agrees to proceed as planned.   4. CBC, Urine culture, EKG today    Moses Suarez MD

## 2017-11-22 NOTE — TELEPHONE ENCOUNTER
----- Message from Elsa Roger MD sent at 11/22/2017  8:28 AM CST -----  Viktor Stinson,    Can you please schedule this patient with Dr. Jama in 1 month with a KUB?    Thanks,  Elsa

## 2017-11-22 NOTE — DISCHARGE SUMMARY
[facility]  Discharge Note  Short Stay    Admit Date: 11/22/2017    Discharge Date and Time: 11/22/2017    Attending Physician: Chava Jama MD     Discharge Provider: Elsa Roger    Diagnoses:  Active Hospital Problems    Diagnosis  POA    *Kidney stone [N20.0]  Yes    MEHNAZ (obstructive sleep apnea) [G47.33]  Yes    Paroxysmal atrial fibrillation [I48.0]  Yes    Essential hypertension [I10]  Yes    Obesity, Class II, BMI 35-39.9, with comorbidity [E66.9]  Yes      Resolved Hospital Problems    Diagnosis Date Resolved POA   No resolved problems to display.       Discharged Condition: good    Hospital Course: Patient was admitted for an outpatient procedure and tolerated the procedure well with no complications.    Final Diagnoses: Same as principal problem.    Disposition: Home or Self Care    Follow up/Patient Instructions:    Medications:  Reconciled Home Medications:   Current Discharge Medication List      START taking these medications    Details   oxyCODONE-acetaminophen (PERCOCET) 5-325 mg per tablet Take 1 tablet by mouth every 4 (four) hours as needed for Pain.  Qty: 31 tablet, Refills: 0      polyethylene glycol (GLYCOLAX) 17 gram PwPk Take 17 g by mouth once daily.  Qty: 30 packet, Refills: 0      !! tamsulosin (FLOMAX) 0.4 mg Cp24 Take 1 capsule (0.4 mg total) by mouth once daily.  Qty: 30 capsule, Refills: 0       !! - Potential duplicate medications found. Please discuss with provider.      CONTINUE these medications which have NOT CHANGED    Details   aspirin (ECOTRIN) 81 MG EC tablet Take 1 tablet (81 mg total) by mouth once daily.  Qty: 30 tablet, Refills: 2    Associated Diagnoses: Paroxysmal atrial fibrillation      dutasteride (AVODART) 0.5 mg capsule Take 1 capsule (0.5 mg total) by mouth once daily.  Qty: 30 capsule, Refills: 11    Associated Diagnoses: BPH with urinary obstruction      lisinopril-hydrochlorothiazide (PRINZIDE,ZESTORETIC) 20-25 mg Tab Take 1 tablet by  mouth once daily.  Qty: 90 tablet, Refills: 3      !! tamsulosin (FLOMAX) 0.4 mg Cp24 Take 1 capsule (0.4 mg total) by mouth every evening.  Qty: 30 capsule, Refills: 11    Associated Diagnoses: BPH with urinary obstruction      sildenafil (VIAGRA) 100 MG tablet Take 1 tablet (100 mg total) by mouth daily as needed for Erectile Dysfunction.  Qty: 4 tablet, Refills: 11    Associated Diagnoses: Erectile dysfunction, unspecified erectile dysfunction type       !! - Potential duplicate medications found. Please discuss with provider.          Discharge Procedure Orders  X-Ray Abdomen AP 1 View   Standing Status: Future  Standing Exp. Date: 11/22/18   Order Specific Question Answer Comments   Reason for Exam: nephrolithiasis      Diet general     Activity as tolerated     Call MD for:  temperature >100.4     Call MD for:  persistent nausea and vomiting     Call MD for:  severe uncontrolled pain     No dressing needed       Follow-up Information     Chava Jama MD In 1 month.    Specialty:  Urology  Why:  with KUB  Contact information:  [address] [place] [id number]  [phone]                       Elsa Roger MD  Urology, PGY-3  Pager# [phone]

## 2017-11-22 NOTE — OP NOTE
[facility]  Operative Note    Date: 11/22/2017    Pre-Op Diagnosis: Right lower pole renal stone    Post-Op Diagnosis: same    Procedure(s) Performed:   1.  Right ESWL    Specimen(s): none    Staff Surgeon: Chava Jama MD    Assistant Surgeon: Elsa Roger MD    Anesthesia: Monitored Local Anesthesia with Sedation    Indications: Juvenal Bernal is a 62 y.o. male with a  right renal stone presenting for definitive stone management.  The stone is radio-opaque on KUB.      Findings: right lower pole renal stone, partially fragmented    Estimated Blood Loss: none    Drains: none    Procedure in Detail:  After risk, benefits, and possible complications of ESWL were discussed, the patient elected to undergo the procedure and informed consent was obtained. All questions were answered in the pre-operative area and the patient was transferred to the lithotripsy suite and placed on the lithotriptor table. SCDs were applied and working.  Time out was preformed, ellis-procedural antibiotics were administered.    The patient's Right-sided stone was aligned on the X-Y- and Z axis.  MAC anesthesia was administered.  When the patient was adequately sedated, 3000 thousand shocks were administered at a rate of 1.5 shocks per second, beginning at 16 KV of power and advanced to 26 KV. Intermittent fluoroscopy was used to monitor fragmentation of the stone.    At the end of the procedure the stone appeared less dense however was still visible.      The patient tolerated the procedure well and was transferred to the PACU in stable condition.      Plan: The patient will follow up with Dr. Jama in 4 weeks with a KUB  prior.  The patient was given prescriptions for percocet, miralax and flomax.  The patient will strain all urine and bring any fragments to the follow up appt for stone analysis.      Elsa Roger MD

## 2017-11-22 NOTE — PROGRESS NOTES
Pt able to void spontaneously without difficulties, PIV removed, pt getting dressed, wife bringing car around, procedural care complete.

## 2017-11-22 NOTE — PLAN OF CARE
Pt tolerated procedure/anesthesia well, vss, no distress noted or reported, denies pain, tolerates PO without difficulties, denies nausea, discharge instructions and scripts reviewed with pt and family, verbalized understanding, consents with chart; Patient is due to void to finalize discharge, IVF to gravity, drinking adequately, instructed to call nurse when feels ready to void, verbalized understanding.

## 2017-11-22 NOTE — PLAN OF CARE
Pt resting comfortably.    Call light in reach.    No questions or concerns at this time.    Dentures still  In. Glasses at bedside

## 2017-11-22 NOTE — DISCHARGE INSTRUCTIONS
ESWL post-op instructions:  You may see some blood in your urine while the stone fragments are passing and a few days afterward. Do not be alarmed, even if the urine was clear for a while. Push fluids and refrain from strenuous activity until clearing occurs. If you have difficulty passing clots or don't improve, call us. You can also try sitting in a warm tub of water to help to urinate if needed. Avoid medications such as Aspirin, Advil, Motrin, Plavix, or Coumadin, which thin the blood and cause bleeding.  If you have never had your stones analyzed, strain all urine and bring stone fragments with you to your next appointment.  Diet:  You may return to your normal diet immediately. Alcohol, spicy foods, acidy foods and drinks with caffeine may cause irritation or frequency and should be used in moderation. To keep your urine flowing freely and to avoid constipation, drink plenty of fluids during the day (8-10 glasses).  Activity:  While the kidney is healing do not engage in strenuous activity. If you are active, you may see more blood in the urine. We would suggest cutting down your activity under these circumstances until the bleeding has stopped, perhaps two weeks.  Bowels:  It is important to keep your bowels regular during the postoperative period. Straining with bowel movements can cause bleeding. A bowel movement every other day is reasonable. Use a mild over-the-counter laxative if needed, such as Milk of Magnesia 2-3 tablespoons, or 1-2 Dulcolax tablets. Call if you continue to have problems. Narcotics can worsen constipation; if you had been taking narcotics for pain, before, during or after your surgery, you may be constipated. Ditropan for bladder spasms may also cause constipation.  Problems you should report to us:  1. Fevers over 101.5 degrees Fahrenheit.   2. Inability to urinate.   3. Drug reactions (hives, rash, nausea, vomiting, diarrhea)   4. Severe burning or pain with urination that is not  improving.   You will also have some burning with urination. This is normal after stone therapy and is also expected while the stent is in place. This burning should be mild or moderate and should improve over time. Severe burning, especially when it is not improving, can be a sign of infection.  Follow-up  After the stone has been fragmented you will likely need an x-ray prior to next clinic appointment    Bring stone fragments with you to your next appt.     In some select cases it is important to not leave the string on the stent.  The stent is usually removed 1-4 weeks after treatment.    Call Urology at [phone] with any problems

## 2017-11-22 NOTE — TRANSFER OF CARE
"Anesthesia Transfer of Care Note    Patient: Juvenal Bernal    Procedure(s) Performed: Procedure(s) (LRB):  LITHOTRIPSY-EXTRACORPOREAL SHOCK WAVE (Right)    Patient location: PACU    Anesthesia Type: general    Transport from OR: Transported from OR on 6-10 L/min O2 by face mask with adequate spontaneous ventilation    Post pain: adequate analgesia    Post assessment: no apparent anesthetic complications    Post vital signs: stable    Level of consciousness: awake and sedated    Nausea/Vomiting: no nausea/vomiting    Complications: none    Transfer of care protocol was followed      Last vitals:   Visit Vitals  BP (!) 159/92 (BP Location: Right arm, Patient Position: Lying)   Pulse 68   Temp 36.8 °C (98.2 °F) (Skin)   Resp 18   Ht 5' 9.5" (1.765 m)   Wt 114.3 kg (252 lb)   SpO2 98%   BMI 36.68 kg/m²     "

## 2017-11-22 NOTE — ANESTHESIA POSTPROCEDURE EVALUATION
"Anesthesia Post Evaluation    Patient: Juvenal Bernal    Procedure(s) Performed: Procedure(s) (LRB):  LITHOTRIPSY-EXTRACORPOREAL SHOCK WAVE (Right)    Final Anesthesia Type: general  Patient location during evaluation: PACU  Patient participation: Yes- Able to Participate  Level of consciousness: awake and alert  Post-procedure vital signs: reviewed and stable  Pain management: adequate  Airway patency: patent  PONV status at discharge: No PONV  Anesthetic complications: no      Cardiovascular status: stable  Respiratory status: unassisted, spontaneous ventilation and room air  Hydration status: euvolemic  Follow-up not needed.        Visit Vitals  BP (!) 145/95   Pulse 60   Temp 36.7 °C (98 °F) (Temporal)   Resp 18   Ht 5' 9.5" (1.765 m)   Wt 114.3 kg (252 lb)   SpO2 100%   BMI 36.68 kg/m²       Pain/Daniel Score: Pain Assessment Performed: Yes (11/22/2017  9:56 AM)  Presence of Pain: denies (11/22/2017  9:56 AM)  Daniel Score: 10 (11/22/2017  9:56 AM)      "

## 2017-12-22 ENCOUNTER — OFFICE VISIT (OUTPATIENT)
Dept: UROLOGY | Facility: CLINIC | Age: 62
End: 2017-12-22
Payer: COMMERCIAL

## 2017-12-22 ENCOUNTER — TELEPHONE (OUTPATIENT)
Dept: UROLOGY | Facility: CLINIC | Age: 62
End: 2017-12-22

## 2017-12-22 ENCOUNTER — HOSPITAL ENCOUNTER (OUTPATIENT)
Dept: RADIOLOGY | Facility: HOSPITAL | Age: 62
Discharge: HOME | End: 2017-12-22
Attending: UROLOGY
Payer: COMMERCIAL

## 2017-12-22 VITALS — HEIGHT: 70 IN | RESPIRATION RATE: 18 BRPM | WEIGHT: 261 LBS | BODY MASS INDEX: 37.37 KG/M2

## 2017-12-22 DIAGNOSIS — N20.1 RIGHT URETERAL STONE: ICD-10-CM

## 2017-12-22 DIAGNOSIS — N20.0 RIGHT KIDNEY STONE: ICD-10-CM

## 2017-12-22 DIAGNOSIS — N20.0 CALCIUM KIDNEY STONE: Primary | ICD-10-CM

## 2017-12-22 PROCEDURE — 82365 CALCULUS SPECTROSCOPY: CPT

## 2017-12-22 PROCEDURE — 74000 XR ABDOMEN AP 1 VIEW: CPT | Mod: TC

## 2017-12-22 PROCEDURE — 74000 XR ABDOMEN AP 1 VIEW: CPT | Mod: 26,,, | Performed by: RADIOLOGY

## 2017-12-22 PROCEDURE — 99999 PR PBB SHADOW E&M-EST. PATIENT-LVL III: CPT | Mod: PBBFAC,,, | Performed by: UROLOGY

## 2017-12-22 PROCEDURE — 99499 UNLISTED E&M SERVICE: CPT | Mod: S$GLB,,, | Performed by: UROLOGY

## 2017-12-22 RX ORDER — TAMSULOSIN HYDROCHLORIDE 0.4 MG/1
0.4 CAPSULE ORAL DAILY
Qty: 30 CAPSULE | Refills: 3 | Status: SHIPPED | OUTPATIENT
Start: 2017-12-22 | End: 2018-01-19

## 2017-12-22 NOTE — PROGRESS NOTES
CC:  Right ESWL on 11/22/17    Juvenal Bernal is a 62 y.o. man who is here for the evaluation of Nephrolithiasis (s/p right ESWL -one month f/u KUB . shoud he continue flomax?)  underwent right ESWL for 3 irregular calcifications overlie the right kidney interpolar region measuring 3-10 mm in size on 11/22/17 without any problem.  He was able to pass some stone fragments.  Had some pain but he feels much better.  Denies any more pain, fever or chills.  He is on Flomax.    Reports good flow and complete bladder emptying.  Had circumcision done.  No family hx of prostate cancer.    His PSA was elevated to 4.7 in June but got better to 2.7 in 8/2017.    Denies flank pain, dysuira, hematuria .      Past Medical History:   Diagnosis Date    Anticoagulant long-term use     Arthritis     knee    Atrial fibrillation     Hypertension      Past Surgical History:   Procedure Laterality Date    TOTAL HIP ARTHROPLASTY Right 2013     Social History   Substance Use Topics    Smoking status: Never Smoker    Smokeless tobacco: Never Used    Alcohol use No     History reviewed. No pertinent family history.  Allergy:  Review of patient's allergies indicates:  No Known Allergies  Outpatient Encounter Prescriptions as of 12/22/2017   Medication Sig Dispense Refill    aspirin (ECOTRIN) 81 MG EC tablet Take 1 tablet (81 mg total) by mouth once daily. 30 tablet 2    dutasteride (AVODART) 0.5 mg capsule Take 1 capsule (0.5 mg total) by mouth once daily. 30 capsule 11    lisinopril-hydrochlorothiazide (PRINZIDE,ZESTORETIC) 20-25 mg Tab Take 1 tablet by mouth once daily. 90 tablet 3    polyethylene glycol (GLYCOLAX) 17 gram PwPk Take 17 g by mouth once daily. 30 packet 0    sildenafil (VIAGRA) 100 MG tablet Take 1 tablet (100 mg total) by mouth daily as needed for Erectile Dysfunction. 4 tablet 11    tamsulosin (FLOMAX) 0.4 mg Cp24 Take 1 capsule (0.4 mg total) by mouth once daily. 30 capsule 3    [DISCONTINUED]  oxyCODONE-acetaminophen (PERCOCET) 5-325 mg per tablet Take 1 tablet by mouth every 4 (four) hours as needed for Pain. 31 tablet 0    [DISCONTINUED] tamsulosin (FLOMAX) 0.4 mg Cp24 Take 1 capsule (0.4 mg total) by mouth every evening. 30 capsule 11    [DISCONTINUED] tamsulosin (FLOMAX) 0.4 mg Cp24 Take 1 capsule (0.4 mg total) by mouth once daily. 30 capsule 0     No facility-administered encounter medications on file as of 12/22/2017.      Review of Systems   General ROS: GENERAL:  No weight gain or loss  SKIN:  No rashes or lacerations  HEAD:  No headaches  EYES:  No exophthalmos, jaundice or blindness  EARS:  No dizziness, tinnitus or hearing loss  NOSE:  No changes in smell  MOUTH & THROAT:  No dyskinetic movements or obvious goiter  CHEST:  No shortness of breath, hyperventilation or cough  CARDIOVASCULAR:  No tachycardia or chest pain  ABDOMEN:  No nausea, vomiting, pain, constipation or diarrhea  URINARY:  No frequency, dysuria or sexual dysfunction  ENDOCRINE:  No polydipsia, polyuria  MUSCULOSKELETAL:  No pain or stiffness of the joints  NEUROLOGIC:  No weakness, sensory changes, seizures, confusion, memory loss, tremor or other abnormal movements  Physical Exam     Vitals:    12/22/17 1049   Resp: 18     Physical Exam   Constitutional: He is oriented to person, place, and time. He appears well-developed and well-nourished. No distress.   HENT:   Head: Normocephalic and atraumatic.   Right Ear: External ear normal.   Left Ear: External ear normal.   Nose: Nose normal.   Mouth/Throat: Oropharynx is clear and moist.   Eyes: Conjunctivae are normal. Pupils are equal, round, and reactive to light.   Neck: Normal range of motion. Neck supple. No JVD present. No tracheal deviation present. No thyromegaly present.   Cardiovascular: Normal rate, regular rhythm, normal heart sounds and intact distal pulses.  Exam reveals no gallop and no friction rub.    No murmur heard.  Pulmonary/Chest: Effort normal and breath  sounds normal. No respiratory distress. He has no wheezes. He exhibits no tenderness.   Abdominal: Soft. Bowel sounds are normal. He exhibits no distension and no mass. There is no tenderness. There is no rebound and no guarding.   Genitourinary: Rectum normal and penis normal. No penile tenderness.   Genitourinary Comments: Prostate 35 grams with negative nodule or negative tenderness     Musculoskeletal: Normal range of motion. He exhibits no edema, tenderness or deformity.   Lymphadenopathy:     He has no cervical adenopathy.   Neurological: He is alert and oriented to person, place, and time.   Skin: Skin is warm and dry. He is not diaphoretic.     Psychiatric: He has a normal mood and affect. His behavior is normal. Thought content normal.     Genitalia:  Scrotum: no rash or lesion  Normal symmetric epididymis without masses  Normal vas palpated  Normal size, symmetric testicles with no masses   Normal urethral meatus with no discharge  Normal circumcised penis with no lesion   Rectal:  Normal perineum and anus upon inspection.  Normal tone, no masses or tenderness;     LABS:  Lab Results   Component Value Date    PSA 2.7 01/08/2016    PSA 0.57 03/13/2009    PSADIAG 2.7 08/18/2017    PSADIAG 4.7 (H) 06/07/2017     Results for orders placed or performed in visit on 08/18/17   Prostate Specific Antigen, Diagnostic   Result Value Ref Range    PSA DIAGNOSTIC 2.7 0.00 - 4.00 ng/mL   Results for orders placed or performed in visit on 06/07/17   Prostate Specific Antigen, Diagnostic   Result Value Ref Range    PSA DIAGNOSTIC 4.7 (H) 0.00 - 4.00 ng/mL     Lab Results   Component Value Date    CREATININE 1.1 08/18/2017    CREATININE 1.3 06/07/2017    CREATININE 1.1 04/02/2016     Results for orders placed or performed in visit on 08/18/17   Testosterone   Result Value Ref Range    Testosterone, Total 324 195.0 - 1138.0 ng/dL     Urine Culture, Routine   Date Value Ref Range Status   11/10/2017 No growth  Final     KUB  today  Showed calcification overlying the left distal ureter, suspect residual stone fragment.  All calcification overlying the right kidney is all gone.    Assessment and Plan:  Juvenal was seen today for nephrolithiasis.    Diagnoses and all orders for this visit:    Calcium kidney stone  -     Basic metabolic panel; Future  -     X-Ray Abdomen AP 1 View; Future  -     US Retroperitoneal Limited; Future  -     tamsulosin (FLOMAX) 0.4 mg Cp24; Take 1 capsule (0.4 mg total) by mouth once daily.  -     Basic metabolic panel; Future  -     Urinary Stone Analysis    Right ureteral stone  -     Basic metabolic panel; Future    BMP today.  Will follow up with BMP, KUB and renal us to r/o hydro in 1 month.  Continue flomax.  Hydration.    Follow-up:  Return in about 5 weeks (around 1/26/2018) for BMP, KUB, and renal US.

## 2017-12-27 LAB
ANNOTATION COMMENT IMP: NORMAL
COMPN STONE: NORMAL
SPECIMEN SOURCE: NORMAL
STONE ANALYSIS IR-IMP: NORMAL

## 2018-01-19 ENCOUNTER — OFFICE VISIT (OUTPATIENT)
Dept: UROLOGY | Facility: CLINIC | Age: 63
End: 2018-01-19
Payer: COMMERCIAL

## 2018-01-19 ENCOUNTER — HOSPITAL ENCOUNTER (OUTPATIENT)
Dept: RADIOLOGY | Facility: HOSPITAL | Age: 63
Discharge: HOME | End: 2018-01-19
Attending: UROLOGY
Payer: COMMERCIAL

## 2018-01-19 VITALS
HEART RATE: 81 BPM | WEIGHT: 267.44 LBS | DIASTOLIC BLOOD PRESSURE: 99 MMHG | HEIGHT: 70 IN | SYSTOLIC BLOOD PRESSURE: 152 MMHG | BODY MASS INDEX: 38.29 KG/M2

## 2018-01-19 DIAGNOSIS — N52.9 ED (ERECTILE DYSFUNCTION) OF ORGANIC ORIGIN: Primary | ICD-10-CM

## 2018-01-19 DIAGNOSIS — N20.0 RIGHT KIDNEY STONE: ICD-10-CM

## 2018-01-19 PROCEDURE — 99999 PR PBB SHADOW E&M-EST. PATIENT-LVL III: CPT | Mod: PBBFAC,,, | Performed by: UROLOGY

## 2018-01-19 PROCEDURE — 74018 RADEX ABDOMEN 1 VIEW: CPT | Mod: 26,,, | Performed by: RADIOLOGY

## 2018-01-19 PROCEDURE — 74018 RADEX ABDOMEN 1 VIEW: CPT | Mod: TC

## 2018-01-19 PROCEDURE — 99024 POSTOP FOLLOW-UP VISIT: CPT | Mod: S$GLB,,, | Performed by: UROLOGY

## 2018-01-19 RX ORDER — TADALAFIL 20 MG/1
20 TABLET ORAL
Qty: 4 TABLET | Refills: 12 | Status: ON HOLD | OUTPATIENT
Start: 2018-01-19 | End: 2023-04-02

## 2018-01-19 NOTE — PROGRESS NOTES
CC:  Right ESWL on 11/22/17    Juvenal Bernal is a 62 y.o. man who is here for the evaluation of Post-op Evaluation (right eswl (right) pt had xray , renal u/s , lab)  underwent right ESWL for 3 irregular calcifications overlie the right kidney interpolar region measuring 3-10 mm in size on 11/22/17 without any problem.  He was able to pass some stone fragments.  Had some pain but he feels much better.  Denies any more pain, fever or chills.  He is on Flomax.  C/o ED.  Tried Viagra but did not work well for him.    Reports good flow and complete bladder emptying.  Had circumcision done.  No family hx of prostate cancer.    His PSA was elevated to 4.7 in June but got better to 2.7 in 8/2017.    Denies flank pain, dysuira, hematuria .      Past Medical History:   Diagnosis Date    Anticoagulant long-term use     Arthritis     knee    Atrial fibrillation     Hypertension      Past Surgical History:   Procedure Laterality Date    TOTAL HIP ARTHROPLASTY Right 2013     Social History   Substance Use Topics    Smoking status: Never Smoker    Smokeless tobacco: Never Used    Alcohol use No     No family history on file.  Allergy:  Review of patient's allergies indicates:  No Known Allergies  Outpatient Encounter Prescriptions as of 1/19/2018   Medication Sig Dispense Refill    aspirin (ECOTRIN) 81 MG EC tablet Take 1 tablet (81 mg total) by mouth once daily. 30 tablet 2    polyethylene glycol (GLYCOLAX) 17 gram PwPk Take 17 g by mouth once daily. 30 packet 0    sildenafil (VIAGRA) 100 MG tablet Take 1 tablet (100 mg total) by mouth daily as needed for Erectile Dysfunction. 4 tablet 11    [DISCONTINUED] tamsulosin (FLOMAX) 0.4 mg Cp24 Take 1 capsule (0.4 mg total) by mouth once daily. 30 capsule 3    dutasteride (AVODART) 0.5 mg capsule Take 1 capsule (0.5 mg total) by mouth once daily. 30 capsule 11    lisinopril-hydrochlorothiazide (PRINZIDE,ZESTORETIC) 20-25 mg Tab Take 1 tablet by mouth once daily. 90  tablet 3    tadalafil (CIALIS) 20 MG Tab Take 1 tablet (20 mg total) by mouth as needed. 4 tablet 12     No facility-administered encounter medications on file as of 1/19/2018.      Review of Systems   General ROS: GENERAL:  No weight gain or loss  SKIN:  No rashes or lacerations  HEAD:  No headaches  EYES:  No exophthalmos, jaundice or blindness  EARS:  No dizziness, tinnitus or hearing loss  NOSE:  No changes in smell  MOUTH & THROAT:  No dyskinetic movements or obvious goiter  CHEST:  No shortness of breath, hyperventilation or cough  CARDIOVASCULAR:  No tachycardia or chest pain  ABDOMEN:  No nausea, vomiting, pain, constipation or diarrhea  URINARY:  No frequency, dysuria or sexual dysfunction  ENDOCRINE:  No polydipsia, polyuria  MUSCULOSKELETAL:  No pain or stiffness of the joints  NEUROLOGIC:  No weakness, sensory changes, seizures, confusion, memory loss, tremor or other abnormal movements  Physical Exam     Vitals:    01/19/18 1119   BP: (!) 152/99   Pulse: 81     Physical Exam   Constitutional: He is oriented to person, place, and time. He appears well-developed and well-nourished. No distress.   HENT:   Head: Normocephalic and atraumatic.   Right Ear: External ear normal.   Left Ear: External ear normal.   Nose: Nose normal.   Mouth/Throat: Oropharynx is clear and moist.   Eyes: Conjunctivae are normal. Pupils are equal, round, and reactive to light.   Neck: Normal range of motion. Neck supple. No JVD present. No tracheal deviation present. No thyromegaly present.   Cardiovascular: Normal rate, regular rhythm, normal heart sounds and intact distal pulses.  Exam reveals no gallop and no friction rub.    No murmur heard.  Pulmonary/Chest: Effort normal and breath sounds normal. No respiratory distress. He has no wheezes. He exhibits no tenderness.   Abdominal: Soft. Bowel sounds are normal. He exhibits no distension and no mass. There is no tenderness. There is no rebound and no guarding.   Genitourinary:  Rectum normal and penis normal. No penile tenderness.   Genitourinary Comments: Prostate 35 grams with negative nodule or negative tenderness     Musculoskeletal: Normal range of motion. He exhibits no edema, tenderness or deformity.   Lymphadenopathy:     He has no cervical adenopathy.   Neurological: He is alert and oriented to person, place, and time.   Skin: Skin is warm and dry. He is not diaphoretic.     Psychiatric: He has a normal mood and affect. His behavior is normal. Thought content normal.     Genitalia:  Scrotum: no rash or lesion  Normal symmetric epididymis without masses  Normal vas palpated  Normal size, symmetric testicles with no masses   Normal urethral meatus with no discharge  Normal circumcised penis with no lesion   Rectal:  Normal perineum and anus upon inspection.  Normal tone, no masses or tenderness;     LABS:  Lab Results   Component Value Date    PSA 2.7 01/08/2016    PSA 0.57 03/13/2009    PSADIAG 2.7 08/18/2017    PSADIAG 4.7 (H) 06/07/2017     Results for orders placed or performed in visit on 08/18/17   Prostate Specific Antigen, Diagnostic   Result Value Ref Range    PSA DIAGNOSTIC 2.7 0.00 - 4.00 ng/mL   Results for orders placed or performed in visit on 06/07/17   Prostate Specific Antigen, Diagnostic   Result Value Ref Range    PSA DIAGNOSTIC 4.7 (H) 0.00 - 4.00 ng/mL     Lab Results   Component Value Date    CREATININE 1.3 12/22/2017    CREATININE 1.1 08/18/2017    CREATININE 1.3 06/07/2017     Results for orders placed or performed in visit on 08/18/17   Testosterone   Result Value Ref Range    Testosterone, Total 324 195.0 - 1138.0 ng/dL     Urine Culture, Routine   Date Value Ref Range Status   11/10/2017 No growth  Final     KUB today  penidng    Assessment and Plan:  Juvenal was seen today for post-op evaluation.    Diagnoses and all orders for this visit:    ED (erectile dysfunction) of organic origin  -     tadalafil (CIALIS) 20 MG Tab; Take 1 tablet (20 mg total) by  mouth as needed.    Right kidney stone  -     X-Ray Abdomen AP 1 View; Future    KUB today.  Trial of Cialis as instructed.  Will order it thru [facility] in order to keep his cost.  All questions answered.    Follow-up:  Follow-up if symptoms worsen or fail to improve.

## 2018-01-22 ENCOUNTER — TELEPHONE (OUTPATIENT)
Dept: UROLOGY | Facility: CLINIC | Age: 63
End: 2018-01-22

## 2018-01-22 DIAGNOSIS — N20.0 CALCIUM KIDNEY STONE: ICD-10-CM

## 2018-01-22 DIAGNOSIS — N20.1 RIGHT URETERAL STONE: Primary | ICD-10-CM

## 2018-01-22 NOTE — TELEPHONE ENCOUNTER
Right ureteral stone  -     CT Renal Stone Study ABD Pelvis WO; Future; Expected date: 01/22/2018    Calcium kidney stone  -     CT Renal Stone Study ABD Pelvis WO; Future; Expected date: 01/22/2018      S/p right ESWL  Please have him do the CT RSS and follow up with me soon.

## 2018-02-02 NOTE — TELEPHONE ENCOUNTER
Several messages left-I have been unable to reach him. I placed him on the recall screen to have them send a letter today and to have a RSS next week.

## 2018-02-27 ENCOUNTER — TELEPHONE (OUTPATIENT)
Dept: UROLOGY | Facility: CLINIC | Age: 63
End: 2018-02-27

## 2018-02-27 DIAGNOSIS — N20.0 KIDNEY STONE: Primary | ICD-10-CM

## 2018-02-28 NOTE — TELEPHONE ENCOUNTER
S/p right ESWL on 11/22/17    KUB on 1/19/18  There are faint calcifications in the region of the lower pole of the left kidney likely related to small stones. Previously visualized calcification in the region of the lower pole of the right kidney no longer seen.    US   RIGHT KIDNEY:  Normal in size.  Measures  11.5 cm.  There is normal corticomedullary differentiation and normal cortical thickness.  There are no solid renal masses or hydronephrosis.  There is a cyst in the superior pole measuring 2.3 x 2.1 x 2.5 cm demonstrating a patent septum.  There are at least three nonobstructing nephroliths; largest measuring 0.8 cm.  Perfusion is normal. Resistive index is normal, measuring 0.73 .      Please have him follow up with KUB (post-op visit)  Kidney stone  -     X-Ray Abdomen AP 1 View; Future; Expected date: 02/27/2018

## 2023-04-01 ENCOUNTER — HOSPITAL ENCOUNTER (INPATIENT)
Facility: HOSPITAL | Age: 68
LOS: 2 days | Discharge: HOME | DRG: 291 | End: 2023-04-03
Attending: STUDENT IN AN ORGANIZED HEALTH CARE EDUCATION/TRAINING PROGRAM | Admitting: STUDENT IN AN ORGANIZED HEALTH CARE EDUCATION/TRAINING PROGRAM
Payer: MEDICARE

## 2023-04-01 DIAGNOSIS — N13.8 BPH WITH URINARY OBSTRUCTION: ICD-10-CM

## 2023-04-01 DIAGNOSIS — R07.9 CHEST PAIN: ICD-10-CM

## 2023-04-01 DIAGNOSIS — I50.9 HEART FAILURE: ICD-10-CM

## 2023-04-01 DIAGNOSIS — R06.02 SHORTNESS OF BREATH: ICD-10-CM

## 2023-04-01 DIAGNOSIS — N40.1 BPH WITH URINARY OBSTRUCTION: ICD-10-CM

## 2023-04-01 DIAGNOSIS — I50.23 ACUTE ON CHRONIC SYSTOLIC CONGESTIVE HEART FAILURE: Primary | ICD-10-CM

## 2023-04-01 PROBLEM — R50.9 FEVER: Status: ACTIVE | Noted: 2023-04-01

## 2023-04-01 PROBLEM — J96.01 ACUTE HYPOXEMIC RESPIRATORY FAILURE: Status: ACTIVE | Noted: 2023-04-01

## 2023-04-01 LAB
ALBUMIN SERPL BCP-MCNC: 3.7 G/DL (ref 3.5–5.2)
ALP SERPL-CCNC: 94 U/L (ref 55–135)
ALT SERPL W/O P-5'-P-CCNC: 28 U/L (ref 10–44)
ANION GAP SERPL CALC-SCNC: 9 MMOL/L (ref 8–16)
AST SERPL-CCNC: 31 U/L (ref 10–40)
BACTERIA #/AREA URNS AUTO: ABNORMAL /HPF
BASOPHILS # BLD AUTO: 0.03 K/UL (ref 0–0.2)
BASOPHILS NFR BLD: 0.3 % (ref 0–1.9)
BILIRUB SERPL-MCNC: 2.9 MG/DL (ref 0.1–1)
BILIRUB UR QL STRIP: NEGATIVE
BNP SERPL-MCNC: 1564 PG/ML (ref 0–99)
BUN SERPL-MCNC: 17 MG/DL (ref 8–23)
CALCIUM SERPL-MCNC: 9.4 MG/DL (ref 8.7–10.5)
CHLORIDE SERPL-SCNC: 105 MMOL/L (ref 95–110)
CLARITY UR REFRACT.AUTO: CLEAR
CO2 SERPL-SCNC: 25 MMOL/L (ref 23–29)
COLOR UR AUTO: YELLOW
CREAT SERPL-MCNC: 1.3 MG/DL (ref 0.5–1.4)
DIFFERENTIAL METHOD: ABNORMAL
EOSINOPHIL # BLD AUTO: 0.2 K/UL (ref 0–0.5)
EOSINOPHIL NFR BLD: 1.8 % (ref 0–8)
ERYTHROCYTE [DISTWIDTH] IN BLOOD BY AUTOMATED COUNT: 16.3 % (ref 11.5–14.5)
EST. GFR  (NO RACE VARIABLE): >60 ML/MIN/1.73 M^2
GLUCOSE SERPL-MCNC: 99 MG/DL (ref 70–110)
GLUCOSE UR QL STRIP: NEGATIVE
HCT VFR BLD AUTO: 44.9 % (ref 40–54)
HCV AB SERPL QL IA: NORMAL
HGB BLD-MCNC: 14 G/DL (ref 14–18)
HGB UR QL STRIP: ABNORMAL
HIV 1+2 AB+HIV1 P24 AG SERPL QL IA: NORMAL
HYALINE CASTS UR QL AUTO: 0 /LPF
IMM GRANULOCYTES # BLD AUTO: 0.04 K/UL (ref 0–0.04)
IMM GRANULOCYTES NFR BLD AUTO: 0.4 % (ref 0–0.5)
INFLUENZA A, MOLECULAR: NOT DETECTED
INFLUENZA B, MOLECULAR: NOT DETECTED
KETONES UR QL STRIP: NEGATIVE
LEUKOCYTE ESTERASE UR QL STRIP: NEGATIVE
LIPASE SERPL-CCNC: 19 U/L (ref 4–60)
LYMPHOCYTES # BLD AUTO: 0.8 K/UL (ref 1–4.8)
LYMPHOCYTES NFR BLD: 8.4 % (ref 18–48)
MCH RBC QN AUTO: 24 PG (ref 27–31)
MCHC RBC AUTO-ENTMCNC: 31.2 G/DL (ref 32–36)
MCV RBC AUTO: 77 FL (ref 82–98)
MICROSCOPIC COMMENT: ABNORMAL
MONOCYTES # BLD AUTO: 0.7 K/UL (ref 0.3–1)
MONOCYTES NFR BLD: 7.2 % (ref 4–15)
NEUTROPHILS # BLD AUTO: 8 K/UL (ref 1.8–7.7)
NEUTROPHILS NFR BLD: 81.9 % (ref 38–73)
NITRITE UR QL STRIP: NEGATIVE
NON-SQ EPI CELLS #/AREA URNS AUTO: <1 /HPF
NRBC BLD-RTO: 0 /100 WBC
PH UR STRIP: 7 [PH] (ref 5–8)
PLATELET # BLD AUTO: 252 K/UL (ref 150–450)
PMV BLD AUTO: 10 FL (ref 9.2–12.9)
POTASSIUM SERPL-SCNC: 4.3 MMOL/L (ref 3.5–5.1)
PROT SERPL-MCNC: 7.1 G/DL (ref 6–8.4)
PROT UR QL STRIP: ABNORMAL
RBC # BLD AUTO: 5.84 M/UL (ref 4.6–6.2)
RBC #/AREA URNS AUTO: 10 /HPF (ref 0–4)
RSV AG BY MOLECULAR METHOD: NOT DETECTED
SARS-COV-2 RNA RESP QL NAA+PROBE: NOT DETECTED
SODIUM SERPL-SCNC: 139 MMOL/L (ref 136–145)
SP GR UR STRIP: 1.02 (ref 1–1.03)
SQUAMOUS #/AREA URNS AUTO: 1 /HPF
TROPONIN I SERPL DL<=0.01 NG/ML-MCNC: 0.04 NG/ML (ref 0–0.03)
URN SPEC COLLECT METH UR: ABNORMAL
WBC # BLD AUTO: 9.73 K/UL (ref 3.9–12.7)
WBC #/AREA URNS AUTO: 1 /HPF (ref 0–5)
YEAST UR QL AUTO: ABNORMAL

## 2023-04-01 PROCEDURE — 83880 ASSAY OF NATRIURETIC PEPTIDE: CPT

## 2023-04-01 PROCEDURE — 80053 COMPREHEN METABOLIC PANEL: CPT

## 2023-04-01 PROCEDURE — 63600175 PHARM REV CODE 636 W HCPCS

## 2023-04-01 PROCEDURE — 93010 ELECTROCARDIOGRAM REPORT: CPT | Mod: ,,, | Performed by: INTERNAL MEDICINE

## 2023-04-01 PROCEDURE — 93005 ELECTROCARDIOGRAM TRACING: CPT

## 2023-04-01 PROCEDURE — 12000002 HC ACUTE/MED SURGE SEMI-PRIVATE ROOM

## 2023-04-01 PROCEDURE — 87040 BLOOD CULTURE FOR BACTERIA: CPT

## 2023-04-01 PROCEDURE — 76705 PR US, ABDOMEN LIMITED: ICD-10-PCS | Mod: 26,,, | Performed by: STUDENT IN AN ORGANIZED HEALTH CARE EDUCATION/TRAINING PROGRAM

## 2023-04-01 PROCEDURE — 96374 THER/PROPH/DIAG INJ IV PUSH: CPT

## 2023-04-01 PROCEDURE — 81001 URINALYSIS AUTO W/SCOPE: CPT

## 2023-04-01 PROCEDURE — 85730 THROMBOPLASTIN TIME PARTIAL: CPT | Performed by: STUDENT IN AN ORGANIZED HEALTH CARE EDUCATION/TRAINING PROGRAM

## 2023-04-01 PROCEDURE — 99285 PR EMERGENCY DEPT VISIT,LEVEL V: ICD-10-PCS | Mod: CS,,, | Performed by: STUDENT IN AN ORGANIZED HEALTH CARE EDUCATION/TRAINING PROGRAM

## 2023-04-01 PROCEDURE — 87389 HIV-1 AG W/HIV-1&-2 AB AG IA: CPT | Performed by: EMERGENCY MEDICINE

## 2023-04-01 PROCEDURE — 82570 ASSAY OF URINE CREATININE: CPT | Performed by: STUDENT IN AN ORGANIZED HEALTH CARE EDUCATION/TRAINING PROGRAM

## 2023-04-01 PROCEDURE — 25000003 PHARM REV CODE 250

## 2023-04-01 PROCEDURE — 83690 ASSAY OF LIPASE: CPT | Performed by: STUDENT IN AN ORGANIZED HEALTH CARE EDUCATION/TRAINING PROGRAM

## 2023-04-01 PROCEDURE — 93010 EKG 12-LEAD: ICD-10-PCS | Mod: ,,, | Performed by: INTERNAL MEDICINE

## 2023-04-01 PROCEDURE — 85379 FIBRIN DEGRADATION QUANT: CPT | Performed by: STUDENT IN AN ORGANIZED HEALTH CARE EDUCATION/TRAINING PROGRAM

## 2023-04-01 PROCEDURE — 25500020 PHARM REV CODE 255: Performed by: STUDENT IN AN ORGANIZED HEALTH CARE EDUCATION/TRAINING PROGRAM

## 2023-04-01 PROCEDURE — 76705 ECHO EXAM OF ABDOMEN: CPT | Mod: 26,,, | Performed by: STUDENT IN AN ORGANIZED HEALTH CARE EDUCATION/TRAINING PROGRAM

## 2023-04-01 PROCEDURE — 99285 EMERGENCY DEPT VISIT HI MDM: CPT | Mod: 25

## 2023-04-01 PROCEDURE — 99285 EMERGENCY DEPT VISIT HI MDM: CPT | Mod: CS,,, | Performed by: STUDENT IN AN ORGANIZED HEALTH CARE EDUCATION/TRAINING PROGRAM

## 2023-04-01 PROCEDURE — 84484 ASSAY OF TROPONIN QUANT: CPT | Mod: 91

## 2023-04-01 PROCEDURE — 85025 COMPLETE CBC W/AUTO DIFF WBC: CPT

## 2023-04-01 PROCEDURE — 0241U SARS-COV2 (COVID) WITH FLU/RSV BY PCR: CPT | Performed by: STUDENT IN AN ORGANIZED HEALTH CARE EDUCATION/TRAINING PROGRAM

## 2023-04-01 PROCEDURE — 85610 PROTHROMBIN TIME: CPT | Performed by: STUDENT IN AN ORGANIZED HEALTH CARE EDUCATION/TRAINING PROGRAM

## 2023-04-01 PROCEDURE — 86803 HEPATITIS C AB TEST: CPT | Performed by: EMERGENCY MEDICINE

## 2023-04-01 RX ORDER — ACETAMINOPHEN 325 MG/1
650 TABLET ORAL
Status: COMPLETED | OUTPATIENT
Start: 2023-04-01 | End: 2023-04-01

## 2023-04-01 RX ORDER — LISINOPRIL 20 MG/1
20 TABLET ORAL ONCE
Status: COMPLETED | OUTPATIENT
Start: 2023-04-01 | End: 2023-04-02

## 2023-04-01 RX ORDER — ACETAMINOPHEN 325 MG/1
650 TABLET ORAL EVERY 8 HOURS PRN
Status: DISCONTINUED | OUTPATIENT
Start: 2023-04-02 | End: 2023-04-02

## 2023-04-01 RX ORDER — SODIUM CHLORIDE 0.9 % (FLUSH) 0.9 %
10 SYRINGE (ML) INJECTION EVERY 12 HOURS PRN
Status: DISCONTINUED | OUTPATIENT
Start: 2023-04-02 | End: 2023-04-03 | Stop reason: HOSPADM

## 2023-04-01 RX ORDER — ACETAMINOPHEN 325 MG/1
650 TABLET ORAL EVERY 4 HOURS PRN
Status: DISCONTINUED | OUTPATIENT
Start: 2023-04-02 | End: 2023-04-03 | Stop reason: HOSPADM

## 2023-04-01 RX ORDER — LISINOPRIL 20 MG/1
20 TABLET ORAL
Status: COMPLETED | OUTPATIENT
Start: 2023-04-01 | End: 2023-04-01

## 2023-04-01 RX ORDER — HYDRALAZINE HYDROCHLORIDE 20 MG/ML
10 INJECTION INTRAMUSCULAR; INTRAVENOUS EVERY 6 HOURS PRN
Status: DISCONTINUED | OUTPATIENT
Start: 2023-04-01 | End: 2023-04-03 | Stop reason: HOSPADM

## 2023-04-01 RX ORDER — ONDANSETRON 2 MG/ML
4 INJECTION INTRAMUSCULAR; INTRAVENOUS EVERY 8 HOURS PRN
Status: DISCONTINUED | OUTPATIENT
Start: 2023-04-02 | End: 2023-04-03 | Stop reason: HOSPADM

## 2023-04-01 RX ORDER — DEXTROSE 40 %
15 GEL (GRAM) ORAL
Status: DISCONTINUED | OUTPATIENT
Start: 2023-04-02 | End: 2023-04-03 | Stop reason: HOSPADM

## 2023-04-01 RX ORDER — HYDROCHLOROTHIAZIDE 25 MG/1
25 TABLET ORAL DAILY
Status: DISCONTINUED | OUTPATIENT
Start: 2023-04-02 | End: 2023-04-02

## 2023-04-01 RX ORDER — ENOXAPARIN SODIUM 100 MG/ML
40 INJECTION SUBCUTANEOUS EVERY 24 HOURS
Status: DISCONTINUED | OUTPATIENT
Start: 2023-04-01 | End: 2023-04-01

## 2023-04-01 RX ORDER — GLUCAGON 1 MG
1 KIT INJECTION
Status: DISCONTINUED | OUTPATIENT
Start: 2023-04-02 | End: 2023-04-03 | Stop reason: HOSPADM

## 2023-04-01 RX ORDER — FUROSEMIDE 10 MG/ML
40 INJECTION INTRAMUSCULAR; INTRAVENOUS
Status: COMPLETED | OUTPATIENT
Start: 2023-04-01 | End: 2023-04-01

## 2023-04-01 RX ORDER — TALC
6 POWDER (GRAM) TOPICAL NIGHTLY PRN
Status: DISCONTINUED | OUTPATIENT
Start: 2023-04-02 | End: 2023-04-03 | Stop reason: HOSPADM

## 2023-04-01 RX ORDER — DUTASTERIDE 0.5 MG/1
0.5 CAPSULE, LIQUID FILLED ORAL DAILY
Status: DISCONTINUED | OUTPATIENT
Start: 2023-04-02 | End: 2023-04-03 | Stop reason: HOSPADM

## 2023-04-01 RX ORDER — LISINOPRIL 20 MG/1
40 TABLET ORAL DAILY
Status: DISCONTINUED | OUTPATIENT
Start: 2023-04-02 | End: 2023-04-03 | Stop reason: HOSPADM

## 2023-04-01 RX ORDER — HEPARIN SODIUM,PORCINE/D5W 25000/250
0-40 INTRAVENOUS SOLUTION INTRAVENOUS CONTINUOUS
Status: DISCONTINUED | OUTPATIENT
Start: 2023-04-02 | End: 2023-04-02

## 2023-04-01 RX ORDER — TALC
6 POWDER (GRAM) TOPICAL NIGHTLY PRN
Status: DISCONTINUED | OUTPATIENT
Start: 2023-04-01 | End: 2023-04-01 | Stop reason: SDUPTHER

## 2023-04-01 RX ORDER — HYDROCHLOROTHIAZIDE 25 MG/1
25 TABLET ORAL
Status: COMPLETED | OUTPATIENT
Start: 2023-04-01 | End: 2023-04-01

## 2023-04-01 RX ORDER — SODIUM CHLORIDE 0.9 % (FLUSH) 0.9 %
10 SYRINGE (ML) INJECTION
Status: DISCONTINUED | OUTPATIENT
Start: 2023-04-01 | End: 2023-04-03 | Stop reason: HOSPADM

## 2023-04-01 RX ORDER — DEXTROSE 40 %
30 GEL (GRAM) ORAL
Status: DISCONTINUED | OUTPATIENT
Start: 2023-04-02 | End: 2023-04-03 | Stop reason: HOSPADM

## 2023-04-01 RX ORDER — FUROSEMIDE 10 MG/ML
40 INJECTION INTRAMUSCULAR; INTRAVENOUS
Status: DISCONTINUED | OUTPATIENT
Start: 2023-04-02 | End: 2023-04-03

## 2023-04-01 RX ORDER — NALOXONE HCL 0.4 MG/ML
0.02 VIAL (ML) INJECTION
Status: DISCONTINUED | OUTPATIENT
Start: 2023-04-02 | End: 2023-04-03 | Stop reason: HOSPADM

## 2023-04-01 RX ADMIN — LISINOPRIL 20 MG: 20 TABLET ORAL at 08:04

## 2023-04-01 RX ADMIN — HYDROCHLOROTHIAZIDE 25 MG: 25 TABLET ORAL at 08:04

## 2023-04-01 RX ADMIN — ACETAMINOPHEN 650 MG: 325 TABLET ORAL at 08:04

## 2023-04-01 RX ADMIN — FUROSEMIDE 40 MG: 10 INJECTION, SOLUTION INTRAMUSCULAR; INTRAVENOUS at 10:04

## 2023-04-01 RX ADMIN — IOHEXOL 100 ML: 350 INJECTION, SOLUTION INTRAVENOUS at 10:04

## 2023-04-02 LAB
ALBUMIN SERPL BCP-MCNC: 3.7 G/DL (ref 3.5–5.2)
ALBUMIN SERPL BCP-MCNC: 3.8 G/DL (ref 3.5–5.2)
ALP SERPL-CCNC: 95 U/L (ref 55–135)
ALT SERPL W/O P-5'-P-CCNC: 27 U/L (ref 10–44)
ANION GAP SERPL CALC-SCNC: 10 MMOL/L (ref 8–16)
ANION GAP SERPL CALC-SCNC: 13 MMOL/L (ref 8–16)
APTT PPP: 25.9 SEC (ref 21–32)
APTT PPP: 33.2 SEC (ref 21–32)
AST SERPL-CCNC: 30 U/L (ref 10–40)
BASOPHILS # BLD AUTO: 0.04 K/UL (ref 0–0.2)
BASOPHILS NFR BLD: 0.4 % (ref 0–1.9)
BILIRUB SERPL-MCNC: 3.3 MG/DL (ref 0.1–1)
BUN SERPL-MCNC: 16 MG/DL (ref 8–23)
BUN SERPL-MCNC: 21 MG/DL (ref 8–23)
CALCIUM SERPL-MCNC: 10 MG/DL (ref 8.7–10.5)
CALCIUM SERPL-MCNC: 9.5 MG/DL (ref 8.7–10.5)
CHLORIDE SERPL-SCNC: 100 MMOL/L (ref 95–110)
CHLORIDE SERPL-SCNC: 104 MMOL/L (ref 95–110)
CHOLEST SERPL-MCNC: 171 MG/DL (ref 120–199)
CHOLEST/HDLC SERPL: 4.8 {RATIO} (ref 2–5)
CO2 SERPL-SCNC: 24 MMOL/L (ref 23–29)
CO2 SERPL-SCNC: 27 MMOL/L (ref 23–29)
CREAT SERPL-MCNC: 1.3 MG/DL (ref 0.5–1.4)
CREAT SERPL-MCNC: 1.5 MG/DL (ref 0.5–1.4)
CREAT UR-MCNC: 64 MG/DL (ref 23–375)
D DIMER PPP IA.FEU-MCNC: 2.17 MG/L FEU
DIFFERENTIAL METHOD: ABNORMAL
EOSINOPHIL # BLD AUTO: 0.2 K/UL (ref 0–0.5)
EOSINOPHIL NFR BLD: 1.7 % (ref 0–8)
ERYTHROCYTE [DISTWIDTH] IN BLOOD BY AUTOMATED COUNT: 17.4 % (ref 11.5–14.5)
EST. GFR  (NO RACE VARIABLE): 51.3 ML/MIN/1.73 M^2
EST. GFR  (NO RACE VARIABLE): >60 ML/MIN/1.73 M^2
ESTIMATED AVG GLUCOSE: 114 MG/DL (ref 68–131)
GLUCOSE SERPL-MCNC: 104 MG/DL (ref 70–110)
GLUCOSE SERPL-MCNC: 98 MG/DL (ref 70–110)
HBA1C MFR BLD: 5.6 % (ref 4–5.6)
HCT VFR BLD AUTO: 46.9 % (ref 40–54)
HDLC SERPL-MCNC: 36 MG/DL (ref 40–75)
HDLC SERPL: 21.1 % (ref 20–50)
HGB BLD-MCNC: 14.6 G/DL (ref 14–18)
IMM GRANULOCYTES # BLD AUTO: 0.04 K/UL (ref 0–0.04)
IMM GRANULOCYTES NFR BLD AUTO: 0.4 % (ref 0–0.5)
INFLUENZA A, MOLECULAR: NOT DETECTED
INFLUENZA B, MOLECULAR: NOT DETECTED
INR PPP: 1.2 (ref 0.8–1.2)
LDLC SERPL CALC-MCNC: 124.4 MG/DL (ref 63–159)
LYMPHOCYTES # BLD AUTO: 1.4 K/UL (ref 1–4.8)
LYMPHOCYTES NFR BLD: 13.7 % (ref 18–48)
MAGNESIUM SERPL-MCNC: 1.8 MG/DL (ref 1.6–2.6)
MAGNESIUM SERPL-MCNC: 2.2 MG/DL (ref 1.6–2.6)
MCH RBC QN AUTO: 23.7 PG (ref 27–31)
MCHC RBC AUTO-ENTMCNC: 31.1 G/DL (ref 32–36)
MCV RBC AUTO: 76 FL (ref 82–98)
MONOCYTES # BLD AUTO: 0.8 K/UL (ref 0.3–1)
MONOCYTES NFR BLD: 8.5 % (ref 4–15)
NEUTROPHILS # BLD AUTO: 7.4 K/UL (ref 1.8–7.7)
NEUTROPHILS NFR BLD: 75.3 % (ref 38–73)
NONHDLC SERPL-MCNC: 135 MG/DL
NRBC BLD-RTO: 0 /100 WBC
PHOSPHATE SERPL-MCNC: 3.6 MG/DL (ref 2.7–4.5)
PHOSPHATE SERPL-MCNC: 3.9 MG/DL (ref 2.7–4.5)
PLATELET # BLD AUTO: 268 K/UL (ref 150–450)
PMV BLD AUTO: 10.9 FL (ref 9.2–12.9)
POTASSIUM SERPL-SCNC: 3.8 MMOL/L (ref 3.5–5.1)
POTASSIUM SERPL-SCNC: 3.8 MMOL/L (ref 3.5–5.1)
PROCALCITONIN SERPL IA-MCNC: 0.11 NG/ML
PROT SERPL-MCNC: 7.4 G/DL (ref 6–8.4)
PROT UR-MCNC: 22 MG/DL (ref 0–15)
PROT/CREAT UR: 0.34 MG/G{CREAT} (ref 0–0.2)
PROTHROMBIN TIME: 11.9 SEC (ref 9–12.5)
RBC # BLD AUTO: 6.16 M/UL (ref 4.6–6.2)
RSV AG BY MOLECULAR METHOD: NOT DETECTED
SARS-COV-2 RNA RESP QL NAA+PROBE: NOT DETECTED
SODIUM SERPL-SCNC: 138 MMOL/L (ref 136–145)
SODIUM SERPL-SCNC: 140 MMOL/L (ref 136–145)
TRIGL SERPL-MCNC: 53 MG/DL (ref 30–150)
TROPONIN I SERPL DL<=0.01 NG/ML-MCNC: 0.04 NG/ML (ref 0–0.03)
TROPONIN I SERPL DL<=0.01 NG/ML-MCNC: 0.06 NG/ML (ref 0–0.03)
WBC # BLD AUTO: 9.87 K/UL (ref 3.9–12.7)

## 2023-04-02 PROCEDURE — 83036 HEMOGLOBIN GLYCOSYLATED A1C: CPT | Performed by: STUDENT IN AN ORGANIZED HEALTH CARE EDUCATION/TRAINING PROGRAM

## 2023-04-02 PROCEDURE — 83735 ASSAY OF MAGNESIUM: CPT | Performed by: STUDENT IN AN ORGANIZED HEALTH CARE EDUCATION/TRAINING PROGRAM

## 2023-04-02 PROCEDURE — 63600175 PHARM REV CODE 636 W HCPCS: Performed by: STUDENT IN AN ORGANIZED HEALTH CARE EDUCATION/TRAINING PROGRAM

## 2023-04-02 PROCEDURE — 94660 CPAP INITIATION&MGMT: CPT

## 2023-04-02 PROCEDURE — 27000221 HC OXYGEN, UP TO 24 HOURS

## 2023-04-02 PROCEDURE — 20600001 HC STEP DOWN PRIVATE ROOM

## 2023-04-02 PROCEDURE — 84484 ASSAY OF TROPONIN QUANT: CPT | Performed by: STUDENT IN AN ORGANIZED HEALTH CARE EDUCATION/TRAINING PROGRAM

## 2023-04-02 PROCEDURE — 36415 COLL VENOUS BLD VENIPUNCTURE: CPT

## 2023-04-02 PROCEDURE — 25000003 PHARM REV CODE 250: Performed by: STUDENT IN AN ORGANIZED HEALTH CARE EDUCATION/TRAINING PROGRAM

## 2023-04-02 PROCEDURE — 80061 LIPID PANEL: CPT | Performed by: STUDENT IN AN ORGANIZED HEALTH CARE EDUCATION/TRAINING PROGRAM

## 2023-04-02 PROCEDURE — 84100 ASSAY OF PHOSPHORUS: CPT | Performed by: STUDENT IN AN ORGANIZED HEALTH CARE EDUCATION/TRAINING PROGRAM

## 2023-04-02 PROCEDURE — 36415 COLL VENOUS BLD VENIPUNCTURE: CPT | Performed by: STUDENT IN AN ORGANIZED HEALTH CARE EDUCATION/TRAINING PROGRAM

## 2023-04-02 PROCEDURE — 80069 RENAL FUNCTION PANEL: CPT

## 2023-04-02 PROCEDURE — 94761 N-INVAS EAR/PLS OXIMETRY MLT: CPT

## 2023-04-02 PROCEDURE — 84145 PROCALCITONIN (PCT): CPT | Performed by: STUDENT IN AN ORGANIZED HEALTH CARE EDUCATION/TRAINING PROGRAM

## 2023-04-02 PROCEDURE — 25000003 PHARM REV CODE 250

## 2023-04-02 PROCEDURE — 99223 1ST HOSP IP/OBS HIGH 75: CPT | Mod: ,,, | Performed by: STUDENT IN AN ORGANIZED HEALTH CARE EDUCATION/TRAINING PROGRAM

## 2023-04-02 PROCEDURE — 27000190 HC CPAP FULL FACE MASK W/VALVE

## 2023-04-02 PROCEDURE — 0241U SARS-COV2 (COVID) WITH FLU/RSV BY PCR: CPT

## 2023-04-02 PROCEDURE — 99223 PR INITIAL HOSPITAL CARE,LEVL III: ICD-10-PCS | Mod: ,,, | Performed by: STUDENT IN AN ORGANIZED HEALTH CARE EDUCATION/TRAINING PROGRAM

## 2023-04-02 PROCEDURE — 85025 COMPLETE CBC W/AUTO DIFF WBC: CPT | Performed by: STUDENT IN AN ORGANIZED HEALTH CARE EDUCATION/TRAINING PROGRAM

## 2023-04-02 PROCEDURE — 85730 THROMBOPLASTIN TIME PARTIAL: CPT | Performed by: STUDENT IN AN ORGANIZED HEALTH CARE EDUCATION/TRAINING PROGRAM

## 2023-04-02 PROCEDURE — 80053 COMPREHEN METABOLIC PANEL: CPT | Performed by: STUDENT IN AN ORGANIZED HEALTH CARE EDUCATION/TRAINING PROGRAM

## 2023-04-02 PROCEDURE — 99900035 HC TECH TIME PER 15 MIN (STAT)

## 2023-04-02 PROCEDURE — 83735 ASSAY OF MAGNESIUM: CPT | Mod: 91

## 2023-04-02 RX ORDER — LANOLIN ALCOHOL/MO/W.PET/CERES
400 CREAM (GRAM) TOPICAL ONCE
Status: COMPLETED | OUTPATIENT
Start: 2023-04-02 | End: 2023-04-02

## 2023-04-02 RX ORDER — POTASSIUM CHLORIDE 20 MEQ/1
40 TABLET, EXTENDED RELEASE ORAL ONCE
Status: COMPLETED | OUTPATIENT
Start: 2023-04-02 | End: 2023-04-02

## 2023-04-02 RX ADMIN — HYDRALAZINE HYDROCHLORIDE 10 MG: 20 INJECTION, SOLUTION INTRAMUSCULAR; INTRAVENOUS at 11:04

## 2023-04-02 RX ADMIN — POTASSIUM CHLORIDE 40 MEQ: 1500 TABLET, EXTENDED RELEASE ORAL at 11:04

## 2023-04-02 RX ADMIN — APIXABAN 5 MG: 5 TABLET, FILM COATED ORAL at 08:04

## 2023-04-02 RX ADMIN — FUROSEMIDE 40 MG: 10 INJECTION, SOLUTION INTRAMUSCULAR; INTRAVENOUS at 05:04

## 2023-04-02 RX ADMIN — HYDROCHLOROTHIAZIDE 25 MG: 25 TABLET ORAL at 08:04

## 2023-04-02 RX ADMIN — LISINOPRIL 20 MG: 20 TABLET ORAL at 12:04

## 2023-04-02 RX ADMIN — LISINOPRIL 40 MG: 20 TABLET ORAL at 08:04

## 2023-04-02 RX ADMIN — APIXABAN 5 MG: 5 TABLET, FILM COATED ORAL at 09:04

## 2023-04-02 RX ADMIN — HYDRALAZINE HYDROCHLORIDE 10 MG: 20 INJECTION, SOLUTION INTRAMUSCULAR; INTRAVENOUS at 04:04

## 2023-04-02 RX ADMIN — DUTASTERIDE 0.5 MG: 0.5 CAPSULE, LIQUID FILLED ORAL at 08:04

## 2023-04-02 RX ADMIN — HEPARIN SODIUM 12 UNITS/KG/HR: 10000 INJECTION, SOLUTION INTRAVENOUS at 01:04

## 2023-04-02 RX ADMIN — Medication 400 MG: at 11:04

## 2023-04-02 NOTE — ED NOTES
Assumed care of patient. Pt remains on cardiac monitor, continuous pulse ox, and cycling blood pressures. Bed placed in low locked position, side rails up x2, call bell is within reach. Will continue to monitor.

## 2023-04-02 NOTE — H&P
"[facility] - Emergency Dept  [facility] Medicine  History & Physical    Patient Name: Juvenal Bernal  MRN: [id number]  Patient Class: IP- Inpatient  Admission Date: 4/1/2023  Attending Physician: Lindsay Hannon DO   Primary Care Provider: Randy Stock MD         Patient information was obtained from patient, past medical records and ER records.     Subjective:     Principal Problem:Acute exacerbation of CHF (congestive heart failure)    Chief Complaint:   Chief Complaint   Patient presents with    Abdominal Pain     X 2 weeks, cough x 3 weeks, tremors. States he used to take BP meds but stopped "because I was better."        HPI: Mr. Bernal is a 66 yo male with HTN, Afib, and MEHNAZ who presented to the ED for shortness of breath. He refers about a month of gradually worsening GUERRERO, orthopnea and PND. He also refers a nocturnal cough and lower extremity edema L>R. He notes some abdominal discomfort related to taking in a deep breath but denies any nausea, vomiting, or diarrhea. Patient denies fevers, chills, chest pain, palpitations, nausea, vomiting, or dysuria. He does not smoke, drink alcohol, or do any drugs. Not compliant to any of his home meds, which include ASA, lisinopril, and HCTZ.     At the ED T 102.2, /140,  and placed on 2L NC. Labs significant for Cr 1.3 (baseline), T.B 2.9. CBC normal however with a left shift. BNP 1564, Trop 0.041. EKG with rates in the 80s and irregularly irregular however with sawtooth appearance. CXR done significant for pulm edema. CTA chest without PE however noted bilateral GGOs. CT abd/pelvis without any appreciable abnormality other than a spiculated lesion at the bladder. He was given IV lasix, Lisinopril, HCTZ and hosp med called for admit.      Past Medical History:   Diagnosis Date    Anticoagulant long-term use     Arthritis     knee    Atrial fibrillation     Hypertension        Past Surgical History:   Procedure Laterality Date    TOTAL HIP ARTHROPLASTY Right " 2013       Review of patient's allergies indicates:  No Known Allergies    No current facility-administered medications on file prior to encounter.     Current Outpatient Medications on File Prior to Encounter   Medication Sig    aspirin (ECOTRIN) 81 MG EC tablet Take 1 tablet (81 mg total) by mouth once daily.    dutasteride (AVODART) 0.5 mg capsule Take 1 capsule (0.5 mg total) by mouth once daily.    lisinopril-hydrochlorothiazide (PRINZIDE,ZESTORETIC) 20-25 mg Tab Take 1 tablet by mouth once daily.    polyethylene glycol (GLYCOLAX) 17 gram PwPk Take 17 g by mouth once daily.    sildenafil (VIAGRA) 100 MG tablet Take 1 tablet (100 mg total) by mouth daily as needed for Erectile Dysfunction.    tadalafil (CIALIS) 20 MG Tab Take 1 tablet (20 mg total) by mouth as needed.     Family History    None       Tobacco Use    Smoking status: Never    Smokeless tobacco: Never   Substance and Sexual Activity    Alcohol use: No     Alcohol/week: 0.0 standard drinks    Drug use: Not on file    Sexual activity: Yes     Partners: Female     Review of Systems   Constitutional:  Negative for chills, fatigue and fever.   HENT:  Negative for congestion and sore throat.    Eyes:  Negative for visual disturbance.   Respiratory:  Positive for cough and shortness of breath. Negative for wheezing.    Cardiovascular:  Positive for leg swelling. Negative for chest pain and palpitations.   Gastrointestinal:  Positive for abdominal pain. Negative for diarrhea, nausea and vomiting.   Endocrine: Negative for polyuria.   Genitourinary:  Negative for dysuria, flank pain and frequency.   Musculoskeletal:  Negative for arthralgias, back pain and myalgias.   Skin:  Negative for pallor and rash.   Neurological:  Negative for light-headedness and headaches.   Objective:     Vital Signs (Most Recent):  Temp: 100.3 °F (37.9 °C) (04/01/23 2130)  Pulse: 77 (04/01/23 2255)  Resp: 20 (04/01/23 2255)  BP: (!) 193/121 (04/01/23 2313)  SpO2: 98 %  (04/01/23 5286)   Vital Signs (24h Range):  Temp:  [100 °F (37.8 °C)-102.2 °F (39 °C)] 100.3 °F (37.9 °C)  Pulse:  [] 77  Resp:  [18-26] 20  SpO2:  [88 %-98 %] 98 %  BP: (146-260)/() 193/121     Weight: 116.8 kg (257 lb 8 oz)  Body mass index is 39.15 kg/m².    Physical Exam  Vitals and nursing note reviewed.   Constitutional:       General: He is not in acute distress.     Appearance: He is not toxic-appearing.   HENT:      Head: Normocephalic and atraumatic.      Mouth/Throat:      Mouth: Mucous membranes are moist.      Pharynx: No oropharyngeal exudate.   Eyes:      Extraocular Movements: Extraocular movements intact.      Pupils: Pupils are equal, round, and reactive to light.   Cardiovascular:      Rate and Rhythm: Normal rate and regular rhythm.      Heart sounds: No murmur heard.     Comments: +JVD  Pulmonary:      Effort: Pulmonary effort is normal.      Breath sounds: Rales (bibasilar rales) present. No wheezing.   Abdominal:      General: Abdomen is flat. Bowel sounds are normal. There is no distension.      Tenderness: There is no abdominal tenderness. There is no guarding.   Musculoskeletal:         General: Swelling (lower ext edema L>R) present. No tenderness. Normal range of motion.      Cervical back: Normal range of motion.      Right lower leg: Edema present.      Left lower leg: Edema present.   Lymphadenopathy:      Cervical: No cervical adenopathy.   Skin:     General: Skin is warm.      Coloration: Skin is not jaundiced.      Findings: No bruising or rash.   Neurological:      General: No focal deficit present.      Mental Status: He is alert and oriented to person, place, and time.      Cranial Nerves: No cranial nerve deficit.         CRANIAL NERVES     CN III, IV, VI   Pupils are equal, round, and reactive to light.     Significant Labs: All pertinent labs within the past 24 hours have been reviewed.    Significant Imaging: I have reviewed all pertinent imaging results/findings  within the past 24 hours.    Assessment/Plan:     * Acute exacerbation of CHF (congestive heart failure)  Patient is identified as having unspecified heart failure that is Acute. CHF is currently uncontrolled due to Continued edema of extremities and JVD, >3 pillow orthopnea, Rales/crackles on pulmonary exam and Pulmonary edema/pleural effusion on CXR. Latest ECHO performed and demonstrates- No results found for this or any previous visit.  . Continue ACE/ARB and Furosemide and monitor clinical status closely. Monitor on telemetry. Patient is on CHF pathway.  Monitor strict Is&Os and daily weights.  Place on fluid restriction of 1.5 L. Continue to stress to patient importance of self efficacy and  on diet for CHF. Last BNP reviewed- and noted below   Recent Labs   Lab 04/01/23 1953   BNP 1,564*     -IV Lasix 40mg BID  -echo ordered  -strict I/Os  -daily weights  -cardiac diet    Fever  No overt infectious symptoms and workup so far unrevealing. Has lower ext edema L>R    -DVT ultrasound ordered      Acute hypoxemic respiratory failure  Patient with Hypoxic Respiratory failure which is Acute.  he is not on home oxygen. Supplemental oxygen was provided and noted-      .   Signs/symptoms of respiratory failure include- increased work of breathing. Contributing diagnoses includes - CHF Labs and images were reviewed. Patient Has not had a recent ABG. Will treat underlying causes and adjust management of respiratory failure as follows-     -IV diuresis  -see plan for heart failure    MEHNAZ (obstructive sleep apnea)  -CPAP qhs      Atrial flutter  Patient with Atrial flutter with irregular ventricular response on admission, which is controlled currently with no current rate control meds. [id number] Score: 2. HASBLED Score: 1-2. Anticoagulation indicated. Anticoagulation done with heparin gtt.    EKG with what appears to be Aflutter with irregular QRS that could be due to variable AV block, however coarse Afib also in  the differential.    -previously on ASA due to [id number] of 1, now has indication for anticoag   -started on IV heparin   -transition to doac when able  -rate controlled without meds, will observe for now as patient is diuresing   -goal HR   -can consider cardiology consult once euvolemic and if still in flutter    Essential hypertension  -lisinopril 40mg  -HCTZ 25mg  -PRN IV hydral for now        VTE Risk Mitigation (From admission, onward)         Ordered     heparin 25,000 units in dextrose 5% (100 units/ml) IV bolus from bag - ADDITIONAL PRN BOLUS - 30 units/kg  As needed (PRN)        Question:  Heparin Infusion Adjustment (DO NOT MODIFY ANSWER)  Answer:  [email or web address]\Images\Pharmacy\HeparinInfusions\heparin LOW INTENSITY nomogram for [facility] [id number]    04/01/23 2333     heparin 25,000 units in dextrose 5% (100 units/ml) IV bolus from bag - ADDITIONAL PRN BOLUS - 60 units/kg  As needed (PRN)        Question:  Heparin Infusion Adjustment (DO NOT MODIFY ANSWER)  Answer:  [email or web address]\epic\Images\Pharmacy\HeparinInfusions\heparin LOW INTENSITY nomogram for [facility] [id number]    04/01/23 2332     heparin 25,000 units in dextrose 5% (100 units/ml) IV bolus from bag INITIAL BOLUS  Once        Question:  Heparin Infusion Adjustment (DO NOT MODIFY ANSWER)  Answer:  [email or web address]\epic\Images\Pharmacy\HeparinInfusions\heparin LOW INTENSITY nomogram for [facility] [id number]    04/01/23 2332     heparin 25,000 units in dextrose 5% 250 mL (100 units/mL) infusion LOW INTENSITY nomogram - [facility]  Continuous        Question Answer Comment   Heparin Infusion Adjustment (DO NOT MODIFY ANSWER) [email or web address]\epic\Images\Pharmacy\HeparinInfusions\heparin LOW INTENSITY nomogram for [facility] [id number]    Begin at (in units/kg/hr) 12        04/01/23 2332     IP VTE HIGH RISK PATIENT  Once         04/01/23 2317     Place sequential compression device  Until discontinued         04/01/23 2317     Place sequential compression device  Until  discontinued         04/01/23 2302                           Yuniel Quiroz MD  Department of Hospital Medicine  [facility] - Emergency Dept

## 2023-04-02 NOTE — ASSESSMENT & PLAN NOTE
Patient with Atrial flutter with irregular ventricular response on admission, which is controlled currently with no current rate control meds. [id number] Score: 2. HASBLED Score: 1-2. Anticoagulation indicated. Anticoagulation done with heparin gtt.    EKG with what appears to be Aflutter with irregular QRS that could be due to variable AV block, however coarse Afib also in the differential.    -previously on ASA due to [id number] of 1, now has indication for anticoag   -started on IV heparin   -transition to doac when able  -rate controlled without meds, will observe for now as patient is diuresing   -goal HR   -can consider cardiology consult once euvolemic and if still in flutter

## 2023-04-02 NOTE — SUBJECTIVE & OBJECTIVE
Past Medical History:   Diagnosis Date    Anticoagulant long-term use     Arthritis     knee    Atrial fibrillation     Hypertension        Past Surgical History:   Procedure Laterality Date    TOTAL HIP ARTHROPLASTY Right 2013       Review of patient's allergies indicates:  No Known Allergies    No current facility-administered medications on file prior to encounter.     Current Outpatient Medications on File Prior to Encounter   Medication Sig    aspirin (ECOTRIN) 81 MG EC tablet Take 1 tablet (81 mg total) by mouth once daily.    dutasteride (AVODART) 0.5 mg capsule Take 1 capsule (0.5 mg total) by mouth once daily.    lisinopril-hydrochlorothiazide (PRINZIDE,ZESTORETIC) 20-25 mg Tab Take 1 tablet by mouth once daily.    polyethylene glycol (GLYCOLAX) 17 gram PwPk Take 17 g by mouth once daily.    sildenafil (VIAGRA) 100 MG tablet Take 1 tablet (100 mg total) by mouth daily as needed for Erectile Dysfunction.    tadalafil (CIALIS) 20 MG Tab Take 1 tablet (20 mg total) by mouth as needed.     Family History    None       Tobacco Use    Smoking status: Never    Smokeless tobacco: Never   Substance and Sexual Activity    Alcohol use: No     Alcohol/week: 0.0 standard drinks    Drug use: Not on file    Sexual activity: Yes     Partners: Female     Review of Systems   Constitutional:  Negative for chills, fatigue and fever.   HENT:  Negative for congestion and sore throat.    Eyes:  Negative for visual disturbance.   Respiratory:  Positive for cough and shortness of breath. Negative for wheezing.    Cardiovascular:  Positive for leg swelling. Negative for chest pain and palpitations.   Gastrointestinal:  Positive for abdominal pain. Negative for diarrhea, nausea and vomiting.   Endocrine: Negative for polyuria.   Genitourinary:  Negative for dysuria, flank pain and frequency.   Musculoskeletal:  Negative for arthralgias, back pain and myalgias.   Skin:  Negative for pallor and rash.   Neurological:  Negative for  light-headedness and headaches.   Objective:     Vital Signs (Most Recent):  Temp: 100.3 °F (37.9 °C) (04/01/23 2130)  Pulse: 77 (04/01/23 2255)  Resp: 20 (04/01/23 2255)  BP: (!) 193/121 (04/01/23 2313)  SpO2: 98 % (04/01/23 2255)   Vital Signs (24h Range):  Temp:  [100 °F (37.8 °C)-102.2 °F (39 °C)] 100.3 °F (37.9 °C)  Pulse:  [] 77  Resp:  [18-26] 20  SpO2:  [88 %-98 %] 98 %  BP: (146-260)/() 193/121     Weight: 116.8 kg (257 lb 8 oz)  Body mass index is 39.15 kg/m².    Physical Exam  Vitals and nursing note reviewed.   Constitutional:       General: He is not in acute distress.     Appearance: He is not toxic-appearing.   HENT:      Head: Normocephalic and atraumatic.      Mouth/Throat:      Mouth: Mucous membranes are moist.      Pharynx: No oropharyngeal exudate.   Eyes:      Extraocular Movements: Extraocular movements intact.      Pupils: Pupils are equal, round, and reactive to light.   Cardiovascular:      Rate and Rhythm: Normal rate and regular rhythm.      Heart sounds: No murmur heard.     Comments: +JVD  Pulmonary:      Effort: Pulmonary effort is normal.      Breath sounds: Rales (bibasilar rales) present. No wheezing.   Abdominal:      General: Abdomen is flat. Bowel sounds are normal. There is no distension.      Tenderness: There is no abdominal tenderness. There is no guarding.   Musculoskeletal:         General: Swelling (lower ext edema L>R) present. No tenderness. Normal range of motion.      Cervical back: Normal range of motion.      Right lower leg: Edema present.      Left lower leg: Edema present.   Lymphadenopathy:      Cervical: No cervical adenopathy.   Skin:     General: Skin is warm.      Coloration: Skin is not jaundiced.      Findings: No bruising or rash.   Neurological:      General: No focal deficit present.      Mental Status: He is alert and oriented to person, place, and time.      Cranial Nerves: No cranial nerve deficit.         CRANIAL NERVES     CN III, IV, VI    Pupils are equal, round, and reactive to light.     Significant Labs: All pertinent labs within the past 24 hours have been reviewed.    Significant Imaging: I have reviewed all pertinent imaging results/findings within the past 24 hours.

## 2023-04-02 NOTE — ED PROVIDER NOTES
"Encounter Date: 4/1/2023       History     Chief Complaint   Patient presents with    Abdominal Pain     X 2 weeks, cough x 3 weeks, tremors. States he used to take BP meds but stopped "because I was better."     Mr. Bernal is a 68 yo male with HTN, Afib, and MEHNAZ who presents to the ED from home for shortness of breath and abdominal discomfort. He noticed GUERRERO for the past month and has orthopnea for the past couple of months, has to sleep propped up on sofa. He decided to come into the ED because he has had worsening SOB the past couple of days. He described the abdominal discomfort as relating to difficulty taking a deep breath and notes the discomfort is located across the epigastrium. He's had a decreased appetite and dry cough for around 3 weeks, denying any sputum production. He endorses wheezing and chest tightness. He denies fever, chills, chest pain, abdominal pain, N/V, constipation, or diarrhea. He has not been taking his blood pressure medications and states that he takes a baby aspirin for his A-fib.    Review of patient's allergies indicates:  No Known Allergies  Past Medical History:   Diagnosis Date    Anticoagulant long-term use     Arthritis     knee    Atrial fibrillation     Hypertension      Past Surgical History:   Procedure Laterality Date    TOTAL HIP ARTHROPLASTY Right 2013     No family history on file.  Social History     Tobacco Use    Smoking status: Never    Smokeless tobacco: Never   Substance Use Topics    Alcohol use: No     Alcohol/week: 0.0 standard drinks     Review of Systems    Physical Exam     Initial Vitals [04/01/23 1826]   BP Pulse Resp Temp SpO2   (!) 260/140 105 18 (!) 102.2 °F (39 °C) (!) 93 %      MAP       --         Physical Exam    Nursing note and vitals reviewed.  Constitutional: He appears well-developed and well-nourished. He is not diaphoretic. No distress.   HENT:   Head: Normocephalic and atraumatic.   Right Ear: External ear normal.   Left Ear: External ear " normal.   Eyes: Conjunctivae and EOM are normal. Right eye exhibits no discharge. Left eye exhibits no discharge.   Neck: Neck supple.   Normal range of motion.  Cardiovascular:  Normal rate and intact distal pulses. An irregularly irregular rhythm present.           No murmur heard.  Pulmonary/Chest: Breath sounds normal. No accessory muscle usage. No tachypnea. No respiratory distress. He has no wheezes. He has no rales. He exhibits no tenderness.   Abdominal: Abdomen is soft. Bowel sounds are normal. He exhibits no distension. There is no abdominal tenderness.   Musculoskeletal:         General: No tenderness. Normal range of motion.      Cervical back: Normal range of motion and neck supple.      Right lower leg: No swelling or tenderness. 1+ Pitting Edema present.      Left lower leg: No swelling or tenderness. 2+ Pitting Edema present.     Neurological: He is alert and oriented to person, place, and time.   Skin: Skin is warm and dry. No erythema.   Psychiatric: He has a normal mood and affect. His behavior is normal. Thought content normal.       ED Course   Procedures  Labs Reviewed   CBC W/ AUTO DIFFERENTIAL - Abnormal; Notable for the following components:       Result Value    MCV 77 (*)     MCH 24.0 (*)     MCHC 31.2 (*)     RDW 16.3 (*)     Gran # (ANC) 8.0 (*)     Lymph # 0.8 (*)     Gran % 81.9 (*)     Lymph % 8.4 (*)     All other components within normal limits   CULTURE, BLOOD   CULTURE, BLOOD   HIV 1 / 2 ANTIBODY   HEPATITIS C ANTIBODY   B-TYPE NATRIURETIC PEPTIDE   COMPREHENSIVE METABOLIC PANEL   TROPONIN I   URINALYSIS, REFLEX TO URINE CULTURE   LIPASE   SARS-COV2 (COVID) WITH FLU/RSV BY PCR     EKG Readings: (Independently Interpreted)   Initial Reading: No STEMI. Rhythm: Atrial Flutter. Heart Rate: 86.     Imaging Results    None       X-Rays:   Independently Interpreted Readings:   Chest X-Ray: Cardiomegaly present.  Increased vascular markings consistent with CHF are present. Left pleural  effusion present.   Abdomen: Scattered ground-glass opacities in the lungs consistent with pulmonary edema   Medications   lisinopriL tablet 20 mg (has no administration in time range)   hydroCHLOROthiazide tablet 25 mg (has no administration in time range)   acetaminophen tablet 650 mg (has no administration in time range)     Medical Decision Making:   History:   Old Medical Records: I decided to obtain old medical records.  Initial Assessment:   Patient arrived to the ED febrile to 102.2, tachycardic to 105 in atrial flutter, hypertensive to 260/140, and satting 94% on room air. He is resting comfortably in bed not in any respiratory distress, able to speak in full sentences. He is not in any acute pain at this time. Peripheral edema on exam but clear lung sounds.   Differential Diagnosis:   New onset heart failure, flash pulmonary edema 2/2 hypertensive emergency, hypertensive urgency, PE, ACS, intraabdominal infection, CAP, UTI  Clinical Tests:   Lab Tests: Ordered and Reviewed  Radiological Study: Ordered and Reviewed  Medical Tests: Ordered and Reviewed  ED Management:  Heart failure, septic, and PE workup initiated. EKG with atrial flutter normal rate at 86. Fever treated with tylenol and HTN treated with po lisinopril and HCTZ. Ambulatory sat of 88% on room air, he was placed on 2L NC. BNP elevated to 1564 and troponin to 0.041 without signs of ischemic changes on EKG. CXR and CT chest demonstrated findings consistent with pulmonary edema. CTA negative for PE. Patient was treated with 1 x IV lasix 40mg. CT A/P without c/f infectious source. COVID/Flu/RSV negative. Patient was admitted to hospital medicine for further evaluation and management of new onset heart failure vs pulmonary edema 2/2 hypertensive emergency. Pending repeat troponin, trend until flat.            ED Course as of 04/01/23 2214   Sat Apr 01, 2023   1930 EKG with atrial flutter, rate 86, no STEMI. [NN]   2007 Patient is a 67-year-old male  with history of AFib/a flutter, hypertension, MEHNAZ who presents for shortness of breath.  Patient reports that he has been short of breath for the last few weeks but is worse over the last 2 days.  He reports significant dyspnea on exertion as well as orthopnea.  He states that he is uncomfortable lying flat due to feeling more short of breath.  He also reports some mild epigastric and umbilical abdominal discomfort that has been ongoing intermittently for the past few weeks.  At this he also reports dry cough for the past few days.  He denies fevers although he is febrile here.  He denies chills, chest pain, nausea, vomiting, diarrhea, dysuria, congestion, sore throat.  On exam, patient is hypertensive.  His heart rate initially was in the low 100s but now he is in rate controlled a flutter.  He is not tachypneic but his SpO2 is anywhere from high 80s to low 90s on room air.  His lungs sound mostly clear.  He does have 1+ pitting edema in his lower extremities.  His legs are symmetric, no unilateral warmth, swelling or erythema.  His abdominal exam is benign.  He is febrile here and intermittently coughing on exam.  Will check abdominal labs and order CT imaging given that the patient is febrile on reporting abdominal pain.  I think it is more likely that the patient has a pneumonia, new onset heart failure or cardiac issues related to uncontrolled hypertension.  Patient has not been taking his home medications for the last several months.  He states that he just did not feel like taking them.  Will order his home blood pressure medications, treat his fever, obtain an ambulatory sat, obtain CT imaging of his chest abdomen pelvis as well as labs.  Anticipate the patient will require admission. [NN]   2111 Ambulatory sat 88% on room air [NN]   2111 BNP(!): 1,564 [NN]   2112 Troponin I(!): 0.041 [NN]   2115 BILIRUBIN TOTAL(!): 2.9 [MM]   2133 SpO2: 98 %  On 2L  [MM]   2210 Temp(!): 102.2 °F (39 °C) [MM]   2210 Temp:  100.3 °F (37.9 °C) [MM]      ED Course User Index  [MM] Charlotte Silva MD  [NN] Ligia Richard MD                 Clinical Impression:   Final diagnoses:  [R06.02] Shortness of breath               Charlotte Silva MD  Resident  04/02/23 0038

## 2023-04-02 NOTE — NURSING
Patient AAOx4, blood pressure elevated (180/104), denies pain or shortness of breath at this time, on 2L oxygen via nasal cannula with oxygen saturation 98%, heparin drip norted and verified with TURNER Leslie, skin intact, belongings at bedside, last bowel movement 4/2/23, fall precautions explained and patient verbalizes understanding and fall contract signed, voiding per urinal, IV site intact, side rails up x3, call light and personal belongings in reach.

## 2023-04-02 NOTE — PHARMACY MED REC
"Admission Medication History     The home medication history was taken by Susana Lopez.    You may go to "Admission" then "Reconcile Home Medications" tabs to review and/or act upon these items.     The home medication list has been updated by the Pharmacy department.   Please read ALL comments highlighted in yellow.   Please address this information as you see fit.    Feel free to contact us if you have any questions or require assistance.      The medications listed below were removed from the home medication list. Please reorder if appropriate:  Patient reports no longer taking the following medication(s):  DUTASTERIDE 0.5 MG  LISINOPRIL-HYDROCHLOROTHIAZIDE 20-25 MG  POLYETHYLENE GLYCOL 17 GRAM  SILDENAFIL 100 MG  TADALAFIL 20 MG    Medications listed below were obtained from: Patient/family  PTA Medications   Medication Sig    aspirin (ECOTRIN) 81 MG EC tablet Take 1 tablet (81 mg total) by mouth once daily.       Potential issues to be addressed PRIOR TO DISCHARGE  Please discuss with the patient barriers to adherence with medication treatment plans    Susana Lopez  EXT [phone]                  .        "

## 2023-04-02 NOTE — ASSESSMENT & PLAN NOTE
Patient is identified as having unspecified heart failure that is Acute. CHF is currently uncontrolled due to Continued edema of extremities and JVD, >3 pillow orthopnea, Rales/crackles on pulmonary exam and Pulmonary edema/pleural effusion on CXR. Latest ECHO performed and demonstrates- No results found for this or any previous visit.  . Continue ACE/ARB and Furosemide and monitor clinical status closely. Monitor on telemetry. Patient is on CHF pathway.  Monitor strict Is&Os and daily weights.  Place on fluid restriction of 1.5 L. Continue to stress to patient importance of self efficacy and  on diet for CHF. Last BNP reviewed- and noted below   Recent Labs   Lab 04/01/23 1953   BNP 1,564*     -IV Lasix 40mg BID  -echo ordered  -strict I/Os  -daily weights  -cardiac diet

## 2023-04-02 NOTE — NURSING
Patient orders reviewed and noted that APTT is ordered to be drawn every morning despite drip being initiated at 0154 with only one previous APTT collected at 2353 on 4/1/23 and resulted as 25.9. Patient's blood pressure elevated at 180/104. PRN hydralazine administered. Dr. Dhillon with [facility] medicine paged and returned. Dr. Dhillon informed of all  information above and reported that he will look at the orders.

## 2023-04-02 NOTE — ASSESSMENT & PLAN NOTE
Patient with Hypoxic Respiratory failure which is Acute.  he is not on home oxygen. Supplemental oxygen was provided and noted-      .   Signs/symptoms of respiratory failure include- increased work of breathing. Contributing diagnoses includes - CHF Labs and images were reviewed. Patient Has not had a recent ABG. Will treat underlying causes and adjust management of respiratory failure as follows-     -IV diuresis  -see plan for heart failure

## 2023-04-02 NOTE — ASSESSMENT & PLAN NOTE
No overt infectious symptoms and workup so far unrevealing. Has lower ext edema L>R    -DVT ultrasound ordered

## 2023-04-02 NOTE — HPI
Mr. Bernal is a 68 yo male with HTN, Afib, and MEHNAZ who presented to the ED for shortness of breath. He refers about a month of gradually worsening GUERRERO, orthopnea and PND. He also refers a nocturnal cough and lower extremity edema L>R. He notes some abdominal discomfort related to taking in a deep breath but denies any nausea, vomiting, or diarrhea. Patient denies fevers, chills, chest pain, palpitations, nausea, vomiting, or dysuria. He does not smoke, drink alcohol, or do any drugs. Not compliant to any of his home meds, which include ASA, lisinopril, and HCTZ.     At the ED T 102.2, /140,  and placed on 2L NC. Labs significant for Cr 1.3 (baseline), T.B 2.9. CBC normal however with a left shift. BNP 1564, Trop 0.041. EKG with rates in the 80s and irregularly irregular however with sawtooth appearance. CXR done significant for pulm edema. CTA chest without PE however noted bilateral GGOs. CT abd/pelvis without any appreciable abnormality other than a spiculated lesion at the bladder. He was given IV lasix, Lisinopril, HCTZ and hosp med called for admit.

## 2023-04-02 NOTE — ED NOTES
Telemetry Verification   Patient placed on Telemetry Box  Verified with War Room  Box # [id number]   Monitor Tech Jennifer   Rate 75   Rhythm A flutter

## 2023-04-03 VITALS
DIASTOLIC BLOOD PRESSURE: 95 MMHG | HEIGHT: 68 IN | RESPIRATION RATE: 18 BRPM | SYSTOLIC BLOOD PRESSURE: 132 MMHG | HEART RATE: 68 BPM | TEMPERATURE: 99 F | WEIGHT: 260 LBS | BODY MASS INDEX: 39.4 KG/M2 | OXYGEN SATURATION: 96 %

## 2023-04-03 LAB
ALBUMIN SERPL BCP-MCNC: 3.6 G/DL (ref 3.5–5.2)
ALP SERPL-CCNC: 94 U/L (ref 55–135)
ALT SERPL W/O P-5'-P-CCNC: 28 U/L (ref 10–44)
ANION GAP SERPL CALC-SCNC: 12 MMOL/L (ref 8–16)
ASCENDING AORTA: 3.75 CM
AST SERPL-CCNC: 41 U/L (ref 10–40)
AV INDEX (PROSTH): 0.61
AV MEAN GRADIENT: 5 MMHG
AV PEAK GRADIENT: 7 MMHG
AV VALVE AREA: 2.25 CM2
AV VELOCITY RATIO: 0.55
BASOPHILS # BLD AUTO: 0.04 K/UL (ref 0–0.2)
BASOPHILS NFR BLD: 0.4 % (ref 0–1.9)
BILIRUB SERPL-MCNC: 2 MG/DL (ref 0.1–1)
BSA FOR ECHO PROCEDURE: 2.38 M2
BUN SERPL-MCNC: 24 MG/DL (ref 8–23)
CALCIUM SERPL-MCNC: 9.7 MG/DL (ref 8.7–10.5)
CHLORIDE SERPL-SCNC: 103 MMOL/L (ref 95–110)
CO2 SERPL-SCNC: 24 MMOL/L (ref 23–29)
CREAT SERPL-MCNC: 1.3 MG/DL (ref 0.5–1.4)
CV ECHO LV RWT: 0.45 CM
DIFFERENTIAL METHOD: ABNORMAL
DOP CALC AO PEAK VEL: 1.32 M/S
DOP CALC AO VTI: 21.15 CM
DOP CALC LVOT AREA: 3.7 CM2
DOP CALC LVOT DIAMETER: 2.16 CM
DOP CALC LVOT PEAK VEL: 0.73 M/S
DOP CALC LVOT STROKE VOLUME: 47.58 CM3
DOP CALCLVOT PEAK VEL VTI: 12.99 CM
ECHO LV POSTERIOR WALL: 1.4 CM (ref 0.6–1.1)
EJECTION FRACTION: 30 %
EOSINOPHIL # BLD AUTO: 0.1 K/UL (ref 0–0.5)
EOSINOPHIL NFR BLD: 0.8 % (ref 0–8)
ERYTHROCYTE [DISTWIDTH] IN BLOOD BY AUTOMATED COUNT: 19.4 % (ref 11.5–14.5)
EST. GFR  (NO RACE VARIABLE): >60 ML/MIN/1.73 M^2
ESTIMATED AVG GLUCOSE: 117 MG/DL (ref 68–131)
FERRITIN SERPL-MCNC: 89 NG/ML (ref 20–300)
FRACTIONAL SHORTENING: 22 % (ref 28–44)
GLUCOSE SERPL-MCNC: 104 MG/DL (ref 70–110)
HBA1C MFR BLD: 5.7 % (ref 4–5.6)
HCT VFR BLD AUTO: 51.6 % (ref 40–54)
HGB BLD-MCNC: 16.3 G/DL (ref 14–18)
IMM GRANULOCYTES # BLD AUTO: 0.03 K/UL (ref 0–0.04)
IMM GRANULOCYTES NFR BLD AUTO: 0.3 % (ref 0–0.5)
INTERVENTRICULAR SEPTUM: 1.4 CM (ref 0.6–1.1)
IRON SERPL-MCNC: 30 UG/DL (ref 45–160)
LA MAJOR: 6.5 CM
LA MINOR: 6.4 CM
LA WIDTH: 5 CM
LEFT ATRIUM SIZE: 5 CM
LEFT ATRIUM VOLUME INDEX MOD: 40.9 ML/M2
LEFT ATRIUM VOLUME INDEX: 59.8 ML/M2
LEFT ATRIUM VOLUME MOD: 93.64 CM3
LEFT ATRIUM VOLUME: 137.05 CM3
LEFT INTERNAL DIMENSION IN SYSTOLE: 4.86 CM (ref 2.1–4)
LEFT VENTRICLE DIASTOLIC VOLUME INDEX: 84.75 ML/M2
LEFT VENTRICLE DIASTOLIC VOLUME: 194.07 ML
LEFT VENTRICLE MASS INDEX: 179 G/M2
LEFT VENTRICLE SYSTOLIC VOLUME INDEX: 48.3 ML/M2
LEFT VENTRICLE SYSTOLIC VOLUME: 110.5 ML
LEFT VENTRICULAR INTERNAL DIMENSION IN DIASTOLE: 6.2 CM (ref 3.5–6)
LEFT VENTRICULAR MASS: 408.84 G
LYMPHOCYTES # BLD AUTO: 0.8 K/UL (ref 1–4.8)
LYMPHOCYTES NFR BLD: 8.5 % (ref 18–48)
MAGNESIUM SERPL-MCNC: 2.3 MG/DL (ref 1.6–2.6)
MCH RBC QN AUTO: 24.4 PG (ref 27–31)
MCHC RBC AUTO-ENTMCNC: 31.6 G/DL (ref 32–36)
MCV RBC AUTO: 77 FL (ref 82–98)
MONOCYTES # BLD AUTO: 1.1 K/UL (ref 0.3–1)
MONOCYTES NFR BLD: 11.1 % (ref 4–15)
MV PEAK E VEL: 1.19 M/S
NEUTROPHILS # BLD AUTO: 7.6 K/UL (ref 1.8–7.7)
NEUTROPHILS NFR BLD: 78.9 % (ref 38–73)
NRBC BLD-RTO: 0 /100 WBC
PHOSPHATE SERPL-MCNC: 4.1 MG/DL (ref 2.7–4.5)
PLATELET # BLD AUTO: 260 K/UL (ref 150–450)
PMV BLD AUTO: 10.5 FL (ref 9.2–12.9)
POCT GLUCOSE: 145 MG/DL (ref 70–110)
POTASSIUM SERPL-SCNC: 3.5 MMOL/L (ref 3.5–5.1)
PROT SERPL-MCNC: 7.3 G/DL (ref 6–8.4)
RA MAJOR: 6.6 CM
RA PRESSURE: 3 MMHG
RA WIDTH: 5.6 CM
RBC # BLD AUTO: 6.67 M/UL (ref 4.6–6.2)
RIGHT VENTRICULAR END-DIASTOLIC DIMENSION: 3.24 CM
RV TISSUE DOPPLER FREE WALL SYSTOLIC VELOCITY 1 (APICAL 4 CHAMBER VIEW): 10.46 CM/S
SATURATED IRON: 6 % (ref 20–50)
SINUS: 3.83 CM
SODIUM SERPL-SCNC: 139 MMOL/L (ref 136–145)
STJ: 2.99 CM
TOTAL IRON BINDING CAPACITY: 514 UG/DL (ref 250–450)
TRANSFERRIN SERPL-MCNC: 347 MG/DL (ref 200–375)
TRICUSPID ANNULAR PLANE SYSTOLIC EXCURSION: 2.46 CM
WBC # BLD AUTO: 9.64 K/UL (ref 3.9–12.7)

## 2023-04-03 PROCEDURE — 99239 PR HOSPITAL DISCHARGE DAY,>30 MIN: ICD-10-PCS | Mod: ,,, | Performed by: STUDENT IN AN ORGANIZED HEALTH CARE EDUCATION/TRAINING PROGRAM

## 2023-04-03 PROCEDURE — 36415 COLL VENOUS BLD VENIPUNCTURE: CPT | Performed by: STUDENT IN AN ORGANIZED HEALTH CARE EDUCATION/TRAINING PROGRAM

## 2023-04-03 PROCEDURE — 85025 COMPLETE CBC W/AUTO DIFF WBC: CPT | Performed by: STUDENT IN AN ORGANIZED HEALTH CARE EDUCATION/TRAINING PROGRAM

## 2023-04-03 PROCEDURE — 94761 N-INVAS EAR/PLS OXIMETRY MLT: CPT

## 2023-04-03 PROCEDURE — 25000003 PHARM REV CODE 250: Performed by: STUDENT IN AN ORGANIZED HEALTH CARE EDUCATION/TRAINING PROGRAM

## 2023-04-03 PROCEDURE — 84466 ASSAY OF TRANSFERRIN: CPT

## 2023-04-03 PROCEDURE — 27000221 HC OXYGEN, UP TO 24 HOURS

## 2023-04-03 PROCEDURE — 83036 HEMOGLOBIN GLYCOSYLATED A1C: CPT | Performed by: STUDENT IN AN ORGANIZED HEALTH CARE EDUCATION/TRAINING PROGRAM

## 2023-04-03 PROCEDURE — 94660 CPAP INITIATION&MGMT: CPT

## 2023-04-03 PROCEDURE — 80053 COMPREHEN METABOLIC PANEL: CPT | Performed by: STUDENT IN AN ORGANIZED HEALTH CARE EDUCATION/TRAINING PROGRAM

## 2023-04-03 PROCEDURE — 63600175 PHARM REV CODE 636 W HCPCS: Performed by: STUDENT IN AN ORGANIZED HEALTH CARE EDUCATION/TRAINING PROGRAM

## 2023-04-03 PROCEDURE — 99900035 HC TECH TIME PER 15 MIN (STAT)

## 2023-04-03 PROCEDURE — 99239 HOSP IP/OBS DSCHRG MGMT >30: CPT | Mod: ,,, | Performed by: STUDENT IN AN ORGANIZED HEALTH CARE EDUCATION/TRAINING PROGRAM

## 2023-04-03 PROCEDURE — 83735 ASSAY OF MAGNESIUM: CPT | Performed by: STUDENT IN AN ORGANIZED HEALTH CARE EDUCATION/TRAINING PROGRAM

## 2023-04-03 PROCEDURE — 82728 ASSAY OF FERRITIN: CPT

## 2023-04-03 PROCEDURE — 84100 ASSAY OF PHOSPHORUS: CPT | Performed by: STUDENT IN AN ORGANIZED HEALTH CARE EDUCATION/TRAINING PROGRAM

## 2023-04-03 RX ORDER — DUTASTERIDE 0.5 MG/1
0.5 CAPSULE, LIQUID FILLED ORAL DAILY
Qty: 30 CAPSULE | Refills: 11 | Status: SHIPPED | OUTPATIENT
Start: 2023-04-03 | End: 2023-05-29 | Stop reason: SDUPTHER

## 2023-04-03 RX ORDER — FUROSEMIDE 40 MG/1
40 TABLET ORAL DAILY
Qty: 30 TABLET | Refills: 11 | Status: SHIPPED | OUTPATIENT
Start: 2023-04-04 | End: 2023-05-05 | Stop reason: ALTCHOICE

## 2023-04-03 RX ORDER — CARVEDILOL 6.25 MG/1
6.25 TABLET ORAL 2 TIMES DAILY
Qty: 60 TABLET | Refills: 11 | Status: ON HOLD | OUTPATIENT
Start: 2023-04-03 | End: 2023-04-06 | Stop reason: SDUPTHER

## 2023-04-03 RX ORDER — FUROSEMIDE 40 MG/1
40 TABLET ORAL DAILY
Status: DISCONTINUED | OUTPATIENT
Start: 2023-04-04 | End: 2023-04-03

## 2023-04-03 RX ORDER — FUROSEMIDE 40 MG/1
40 TABLET ORAL DAILY
Status: DISCONTINUED | OUTPATIENT
Start: 2023-04-03 | End: 2023-04-03 | Stop reason: HOSPADM

## 2023-04-03 RX ORDER — LISINOPRIL 40 MG/1
40 TABLET ORAL DAILY
Qty: 90 TABLET | Refills: 3 | Status: SHIPPED | OUTPATIENT
Start: 2023-04-04 | End: 2023-10-06 | Stop reason: ALTCHOICE

## 2023-04-03 RX ORDER — CARVEDILOL 6.25 MG/1
6.25 TABLET ORAL 2 TIMES DAILY
Status: DISCONTINUED | OUTPATIENT
Start: 2023-04-03 | End: 2023-04-03 | Stop reason: HOSPADM

## 2023-04-03 RX ADMIN — CARVEDILOL 6.25 MG: 6.25 TABLET, FILM COATED ORAL at 08:04

## 2023-04-03 RX ADMIN — FUROSEMIDE 40 MG: 10 INJECTION, SOLUTION INTRAMUSCULAR; INTRAVENOUS at 05:04

## 2023-04-03 RX ADMIN — DUTASTERIDE 0.5 MG: 0.5 CAPSULE, LIQUID FILLED ORAL at 08:04

## 2023-04-03 RX ADMIN — LISINOPRIL 40 MG: 20 TABLET ORAL at 08:04

## 2023-04-03 RX ADMIN — APIXABAN 5 MG: 5 TABLET, FILM COATED ORAL at 08:04

## 2023-04-03 RX ADMIN — FUROSEMIDE 40 MG: 40 TABLET ORAL at 08:04

## 2023-04-03 NOTE — DISCHARGE SUMMARY
[facility] Medicine  Discharge Summary      Patient Name: Juvenal Bernal  MRN: [id number]  [facility]: [id number]  Patient Class: IP- Inpatient  Admission Date: 4/1/2023  Hospital Length of Stay: 2 days  Discharge Date and Time:  04/03/2023 5:13 PM  Attending Physician: Lindsay Hannon DO   Discharging Provider: Nishant Olmstead MD  Primary Care Provider: Randy Stock MD  [facility] Medicine Team: [facility] HOSP MED 1 Nishant Olmstead MD  Primary Care Team: [facility] HOSP MED 1    HPI:   Mr. Bernal is a 66 yo male with HTN, Afib, and MEHNAZ who presented to the ED for shortness of breath. He refers about a month of gradually worsening GUERRERO, orthopnea and PND. He also refers a nocturnal cough and lower extremity edema L>R. He notes some abdominal discomfort related to taking in a deep breath but denies any nausea, vomiting, or diarrhea. Patient denies fevers, chills, chest pain, palpitations, nausea, vomiting, or dysuria. He does not smoke, drink alcohol, or do any drugs. Not compliant to any of his home meds, which include ASA, lisinopril, and HCTZ.     At the ED T 102.2, /140,  and placed on 2L NC. Labs significant for Cr 1.3 (baseline), T.B 2.9. CBC normal however with a left shift. BNP 1564, Trop 0.041. EKG with rates in the 80s and irregularly irregular however with sawtooth appearance. CXR done significant for pulm edema. CTA chest without PE however noted bilateral GGOs. CT abd/pelvis without any appreciable abnormality other than a spiculated lesion at the bladder. He was given IV lasix, Lisinopril, HCTZ and hosp med called for admit.      * No surgery found *      Hospital Course:   Patient admitted for acute hypoxic respiratory failure suspected due to new onset heart failure. He was started on IV diuresis with improvement. Additionally, patient started on apixaban for aflutter/atrial fibrillation. Coreg also started. Patient transitioned to oral lasix. Echo notable for EF 30-35% with increase LV myocardium  concerning for infiltrative process. Patient stable for discharge with PCP and cardiology follow up.         Goals of Care Treatment Preferences:  Code Status: Full Code      Consults:     No new Assessment & Plan notes have been filed under this hospital service since the last note was generated.  Service: Hospital Medicine    Final Active Diagnoses:    Diagnosis Date Noted POA    PRINCIPAL PROBLEM:  Acute exacerbation of CHF (congestive heart failure) [I50.9] 04/01/2023 Yes    Acute hypoxemic respiratory failure [J96.01] 04/01/2023 Yes    Fever [R50.9] 04/01/2023 Yes    MEHNAZ (obstructive sleep apnea) [G47.33]  Yes    Atrial flutter [I48.92] 12/22/2015 Yes    Essential hypertension [I10] 12/04/2015 Yes      Problems Resolved During this Admission:       Discharged Condition: fair    Disposition: Home or Self Care    Follow Up:   Follow-up Information     Randy Stock MD Follow up.    Specialty: Internal Medicine  Why: called and left message for PCP appointment  Contact information:  [address] [address]  [phone]                       Patient Instructions:      Ambulatory referral/consult to Cardiology   Standing Status: Future   Referral Priority: Routine Referral Type: Consultation   Referral Reason: Specialty Services Required   Requested Specialty: Cardiology   Number of Visits Requested: 1     Ambulatory referral/consult to Internal Medicine   Standing Status: Future   Referral Priority: Routine Referral Type: Consultation   Referral Reason: Specialty Services Required   Requested Specialty: Internal Medicine   Number of Visits Requested: 1       Significant Diagnostic Studies: N/A    Pending Diagnostic Studies:     None         Medications:  Reconciled Home Medications:      Medication List      START taking these medications    carvediloL 6.25 MG tablet  Commonly known as: COREG  Take 1 tablet (6.25 mg total) by mouth 2 (two) times daily.     ELIQUIS 5 mg Tab  Generic  drug: apixaban  Take 1 tablet (5 mg total) by mouth 2 (two) times daily.     furosemide 40 MG tablet  Commonly known as: LASIX  Take 1 tablet (40 mg total) by mouth once daily.  Start taking on: April 4, 2023     lisinopriL 40 MG tablet  Commonly known as: PRINIVIL,ZESTRIL  Take 1 tablet (40 mg total) by mouth once daily.  Start taking on: April 4, 2023        CONTINUE taking these medications    aspirin 81 MG EC tablet  Commonly known as: ECOTRIN  Take 1 tablet (81 mg total) by mouth once daily.     dutasteride 0.5 mg capsule  Commonly known as: AVODART  Take 1 capsule (0.5 mg total) by mouth once daily.            Indwelling Lines/Drains at time of discharge:   Lines/Drains/Airways     None                 Time spent on the discharge of patient: 35 minutes         Nishant Olmstead MD  Department of Hospital Medicine  [facility]  [facility]

## 2023-04-03 NOTE — PROGRESS NOTES
[facility] Medicine  Progress Note    Patient Name: Juvenal Bernal  MRN: [id number]  Patient Class: IP- Inpatient   Admission Date: 4/1/2023  Length of Stay: 2 days  Attending Physician: Lindsay Hannon DO  Primary Care Provider: Randy Stock MD        Subjective:     Principal Problem:Acute exacerbation of CHF (congestive heart failure)        HPI:  Mr. Bernal is a 66 yo male with HTN, Afib, and MEHNAZ who presented to the ED for shortness of breath. He refers about a month of gradually worsening GUERRERO, orthopnea and PND. He also refers a nocturnal cough and lower extremity edema L>R. He notes some abdominal discomfort related to taking in a deep breath but denies any nausea, vomiting, or diarrhea. Patient denies fevers, chills, chest pain, palpitations, nausea, vomiting, or dysuria. He does not smoke, drink alcohol, or do any drugs. Not compliant to any of his home meds, which include ASA, lisinopril, and HCTZ.     At the ED T 102.2, /140,  and placed on 2L NC. Labs significant for Cr 1.3 (baseline), T.B 2.9. CBC normal however with a left shift. BNP 1564, Trop 0.041. EKG with rates in the 80s and irregularly irregular however with sawtooth appearance. CXR done significant for pulm edema. CTA chest without PE however noted bilateral GGOs. CT abd/pelvis without any appreciable abnormality other than a spiculated lesion at the bladder. He was given IV lasix, Lisinopril, HCTZ and hosp med called for admit.      Overview/Hospital Course:  Patient admitted for acute hypoxic respiratory failure suspected due to new onset heart failure. He was started on IV diuresis with improvement. Additionally, patient started on apixaban for aflutter/atrial fibrillation. Coreg also started. Patient transitioned to oral lasix. Pending echo.      Interval History: Improved today, transitioned to PO lasix. Started coreg.    Review of Systems   Constitutional:  Negative for chills, fatigue and fever.   HENT:  Negative  for congestion and sore throat.    Eyes:  Negative for visual disturbance.   Respiratory:  Negative for cough, shortness of breath and wheezing.    Cardiovascular:  Negative for chest pain, palpitations and leg swelling.   Gastrointestinal:  Negative for abdominal pain, diarrhea, nausea and vomiting.   Endocrine: Negative for polyuria.   Genitourinary:  Negative for dysuria, flank pain and frequency.   Musculoskeletal:  Negative for arthralgias, back pain and myalgias.   Skin:  Negative for pallor and rash.   Neurological:  Negative for light-headedness and headaches.   Objective:     Vital Signs (Most Recent):  Temp: 98.5 °F (36.9 °C) (04/03/23 1204)  Pulse: (!) 115 (04/03/23 1204)  Resp: 18 (04/03/23 1204)  BP: (!) 132/95 (04/03/23 1204)  SpO2: 96 % (04/03/23 1204)   Vital Signs (24h Range):  Temp:  [97.9 °F (36.6 °C)-98.9 °F (37.2 °C)] 98.5 °F (36.9 °C)  Pulse:  [] 115  Resp:  [16-20] 18  SpO2:  [92 %-97 %] 96 %  BP: (132-210)/() 132/95     Weight: 118.2 kg (260 lb 9.3 oz)  Body mass index is 39.62 kg/m².    Intake/Output Summary (Last 24 hours) at 4/3/2023 1209  Last data filed at 4/3/2023 0600  Gross per 24 hour   Intake 100 ml   Output 1500 ml   Net -1400 ml      Physical Exam  Vitals and nursing note reviewed.   Constitutional:       General: He is not in acute distress.     Appearance: He is not toxic-appearing.   HENT:      Head: Normocephalic and atraumatic.      Mouth/Throat:      Mouth: Mucous membranes are moist.      Pharynx: No oropharyngeal exudate.   Eyes:      Extraocular Movements: Extraocular movements intact.      Pupils: Pupils are equal, round, and reactive to light.   Cardiovascular:      Rate and Rhythm: Normal rate and regular rhythm.      Heart sounds: No murmur heard.  Pulmonary:      Effort: Pulmonary effort is normal.      Breath sounds: No wheezing or rales.   Abdominal:      General: Abdomen is flat. Bowel sounds are normal. There is no distension.      Tenderness: There  is no abdominal tenderness. There is no guarding.   Musculoskeletal:         General: No swelling or tenderness. Normal range of motion.      Cervical back: Normal range of motion.      Right lower leg: No edema.      Left lower leg: No edema.   Lymphadenopathy:      Cervical: No cervical adenopathy.   Skin:     General: Skin is warm.      Coloration: Skin is not jaundiced.      Findings: No bruising or rash.   Neurological:      General: No focal deficit present.      Mental Status: He is alert and oriented to person, place, and time.      Cranial Nerves: No cranial nerve deficit.       Significant Labs: All pertinent labs within the past 24 hours have been reviewed.    Significant Imaging: I have reviewed all pertinent imaging results/findings within the past 24 hours.      Assessment/Plan:      * Acute exacerbation of CHF (congestive heart failure)  Patient is identified as having unspecified heart failure that is Acute. CHF is currently uncontrolled due to Continued edema of extremities and JVD, >3 pillow orthopnea, Rales/crackles on pulmonary exam and Pulmonary edema/pleural effusion on CXR. Latest ECHO performed and demonstrates- No results found for this or any previous visit.  . Continue ACE/ARB and Furosemide and monitor clinical status closely. Monitor on telemetry. Patient is on CHF pathway.  Monitor strict Is&Os and daily weights.  Place on fluid restriction of 1.5 L. Continue to stress to patient importance of self efficacy and  on diet for CHF. Last BNP reviewed- and noted below   Recent Labs   Lab 04/01/23 1953   BNP 1,564*     - continue lasix 40 mg daily PO  - echo pending  - strict I/Os  - daily weights  - cardiac diet    Fever  No overt infectious symptoms other than fever noted on admit and workup so far unrevealing. No evidence of clots. Unclear etiology, suspect viral infection that is resolving.      Acute hypoxemic respiratory failure  Patient with Hypoxic Respiratory failure which is  Acute.  he is not on home oxygen. Supplemental oxygen was provided and noted- Oxygen Concentration (%):  [30] 30    .   Signs/symptoms of respiratory failure include- increased work of breathing. Contributing diagnoses includes - CHF Labs and images were reviewed. Patient Has not had a recent ABG. Will treat underlying causes and adjust management of respiratory failure as follows-     -see plan for heart failure    MEHNAZ (obstructive sleep apnea)  - CPAP qhs      Atrial flutter  Patient with Atrial flutter with irregular ventricular response on admission, which is controlled currently with no current rate control meds. [id number] Score: 2. HASBLED Score: 1-2. Anticoagulation indicated. Anticoagulation done with heparin gtt.    EKG with what appears to be Aflutter with irregular QRS that could be due to variable AV block, however coarse Afib also in the differential.    -previously on ASA due to [id number] of 1, now has indication for anticoag   -started apixaban  -rate controlled without meds   -goal HR     Essential hypertension  - continue lisinopril 40mg  - started carvedilol 6.25 mg BID for aflutter/afib control and GDMT  - will hold HCTZ for now        VTE Risk Mitigation (From admission, onward)         Ordered     apixaban tablet 5 mg  2 times daily         04/02/23 0846     IP VTE HIGH RISK PATIENT  Once         04/01/23 2317     Place sequential compression device  Until discontinued         04/01/23 2317     Place sequential compression device  Until discontinued         04/01/23 2304                Discharge Planning   JESSICA: 4/3/2023     Code Status: Full Code   Is the patient medically ready for discharge?: Yes    Reason for patient still in hospital (select all that apply): Patient trending condition      Discharge Delays: None known at this time              Nishant Olmstead MD  Department of Hospital Medicine   [facility]

## 2023-04-03 NOTE — SUBJECTIVE & OBJECTIVE
Interval History: Improved today, transitioned to PO lasix. Started coreg.    Review of Systems   Constitutional:  Negative for chills, fatigue and fever.   HENT:  Negative for congestion and sore throat.    Eyes:  Negative for visual disturbance.   Respiratory:  Negative for cough, shortness of breath and wheezing.    Cardiovascular:  Negative for chest pain, palpitations and leg swelling.   Gastrointestinal:  Negative for abdominal pain, diarrhea, nausea and vomiting.   Endocrine: Negative for polyuria.   Genitourinary:  Negative for dysuria, flank pain and frequency.   Musculoskeletal:  Negative for arthralgias, back pain and myalgias.   Skin:  Negative for pallor and rash.   Neurological:  Negative for light-headedness and headaches.   Objective:     Vital Signs (Most Recent):  Temp: 98.5 °F (36.9 °C) (04/03/23 1204)  Pulse: (!) 115 (04/03/23 1204)  Resp: 18 (04/03/23 1204)  BP: (!) 132/95 (04/03/23 1204)  SpO2: 96 % (04/03/23 1204)   Vital Signs (24h Range):  Temp:  [97.9 °F (36.6 °C)-98.9 °F (37.2 °C)] 98.5 °F (36.9 °C)  Pulse:  [] 115  Resp:  [16-20] 18  SpO2:  [92 %-97 %] 96 %  BP: (132-210)/() 132/95     Weight: 118.2 kg (260 lb 9.3 oz)  Body mass index is 39.62 kg/m².    Intake/Output Summary (Last 24 hours) at 4/3/2023 1209  Last data filed at 4/3/2023 0600  Gross per 24 hour   Intake 100 ml   Output 1500 ml   Net -1400 ml      Physical Exam  Vitals and nursing note reviewed.   Constitutional:       General: He is not in acute distress.     Appearance: He is not toxic-appearing.   HENT:      Head: Normocephalic and atraumatic.      Mouth/Throat:      Mouth: Mucous membranes are moist.      Pharynx: No oropharyngeal exudate.   Eyes:      Extraocular Movements: Extraocular movements intact.      Pupils: Pupils are equal, round, and reactive to light.   Cardiovascular:      Rate and Rhythm: Normal rate and regular rhythm.      Heart sounds: No murmur heard.  Pulmonary:      Effort: Pulmonary  effort is normal.      Breath sounds: No wheezing or rales.   Abdominal:      General: Abdomen is flat. Bowel sounds are normal. There is no distension.      Tenderness: There is no abdominal tenderness. There is no guarding.   Musculoskeletal:         General: No swelling or tenderness. Normal range of motion.      Cervical back: Normal range of motion.      Right lower leg: No edema.      Left lower leg: No edema.   Lymphadenopathy:      Cervical: No cervical adenopathy.   Skin:     General: Skin is warm.      Coloration: Skin is not jaundiced.      Findings: No bruising or rash.   Neurological:      General: No focal deficit present.      Mental Status: He is alert and oriented to person, place, and time.      Cranial Nerves: No cranial nerve deficit.       Significant Labs: All pertinent labs within the past 24 hours have been reviewed.    Significant Imaging: I have reviewed all pertinent imaging results/findings within the past 24 hours.

## 2023-04-03 NOTE — ASSESSMENT & PLAN NOTE
Patient with Atrial flutter with irregular ventricular response on admission, which is controlled currently with no current rate control meds. [id number] Score: 2. HASBLED Score: 1-2. Anticoagulation indicated. Anticoagulation done with heparin gtt.    EKG with what appears to be Aflutter with irregular QRS that could be due to variable AV block, however coarse Afib also in the differential.    -previously on ASA due to [id number] of 1, now has indication for anticoag   -started apixaban  -rate controlled without meds   -goal HR

## 2023-04-03 NOTE — ASSESSMENT & PLAN NOTE
Patient with Hypoxic Respiratory failure which is Acute.  he is not on home oxygen. Supplemental oxygen was provided and noted- Oxygen Concentration (%):  [30] 30    .   Signs/symptoms of respiratory failure include- increased work of breathing. Contributing diagnoses includes - CHF Labs and images were reviewed. Patient Has not had a recent ABG. Will treat underlying causes and adjust management of respiratory failure as follows-     -see plan for heart failure

## 2023-04-03 NOTE — HOSPITAL COURSE
Patient admitted for acute hypoxic respiratory failure suspected due to new onset heart failure. He was started on IV diuresis with improvement. Additionally, patient started on apixaban for aflutter/atrial fibrillation. Coreg also started. Patient transitioned to oral lasix. Echo notable for EF 30-35% with increase LV myocardium concerning for infiltrative process. Patient stable for discharge with PCP and cardiology follow up.

## 2023-04-03 NOTE — PLAN OF CARE
2352 pt hypertensive, bp 210/113, denies symptoms; administered PRN hydralazine at 2356 and  took bp immediately after, BP slightly decreased, diastolic remain high at 101. Willl recheck in 30 mins.           END OF SHIFT NOTE  Pt rested well throughout shift, no distress at this time, no complaints, free of pain. Pt hypertensive throughout shift, other VSS. CPAP worn during sleep,  bed in lowest position, side rails up x2. Bed alarm set, personal items within reach. Call light within reach.       SEB Guidry RN       Problem: Adult Inpatient Plan of Care  Goal: Plan of Care Review  Outcome: Ongoing, Progressing  Goal: Patient-Specific Goal (Individualized)  Outcome: Ongoing, Progressing  Goal: Absence of Hospital-Acquired Illness or Injury  Outcome: Ongoing, Progressing  Goal: Optimal Comfort and Wellbeing  Outcome: Ongoing, Progressing  Goal: Readiness for Transition of Care  Outcome: Ongoing, Progressing     Problem: Fall Injury Risk  Goal: Absence of Fall and Fall-Related Injury  Outcome: Ongoing, Progressing     Problem: Pain Acute  Goal: Acceptable Pain Control and Functional Ability  Outcome: Ongoing, Progressing

## 2023-04-03 NOTE — PLAN OF CARE
[facility]  Discharge Final Note    Primary Care Provider: Randy Stock MD    Expected Discharge Date: 4/3/2023    Final Discharge Note (most recent)       Final Note - 04/03/23 1015          Final Note    Assessment Type Final Discharge Note     Anticipated Discharge Disposition Home or Self Care     Hospital Resources/Appts/Education Provided Provided patient/caregiver with written discharge plan information        Post-Acute Status    Discharge Delays None known at this time                     Important Message from Medicare             Pt d/c home with family. No d/c needs reported by medical team at this time.     Denise Hare LCSW  Case Management/[facility]  [phone]

## 2023-04-03 NOTE — ASSESSMENT & PLAN NOTE
No overt infectious symptoms other than fever noted on admit and workup so far unrevealing. No evidence of clots. Unclear etiology, suspect viral infection that is resolving.

## 2023-04-03 NOTE — ASSESSMENT & PLAN NOTE
Patient is identified as having unspecified heart failure that is Acute. CHF is currently uncontrolled due to Continued edema of extremities and JVD, >3 pillow orthopnea, Rales/crackles on pulmonary exam and Pulmonary edema/pleural effusion on CXR. Latest ECHO performed and demonstrates- No results found for this or any previous visit.  . Continue ACE/ARB and Furosemide and monitor clinical status closely. Monitor on telemetry. Patient is on CHF pathway.  Monitor strict Is&Os and daily weights.  Place on fluid restriction of 1.5 L. Continue to stress to patient importance of self efficacy and  on diet for CHF. Last BNP reviewed- and noted below   Recent Labs   Lab 04/01/23 1953   BNP 1,564*     - continue lasix 40 mg daily PO  - echo pending  - strict I/Os  - daily weights  - cardiac diet

## 2023-04-04 ENCOUNTER — PATIENT MESSAGE (OUTPATIENT)
Dept: ADMINISTRATIVE | Facility: CLINIC | Age: 68
End: 2023-04-04

## 2023-04-04 ENCOUNTER — PATIENT OUTREACH (OUTPATIENT)
Dept: ADMINISTRATIVE | Facility: CLINIC | Age: 68
End: 2023-04-04

## 2023-04-04 NOTE — PROGRESS NOTES
C3 nurse attempted to contact Juvenal Bernal for a TCC post hospital discharge follow up call. No answer. The patient does not have a scheduled HOSFU appointment, and the pt does not have an [facility] PCP.

## 2023-04-05 PROBLEM — N17.9 AKI (ACUTE KIDNEY INJURY): Status: ACTIVE | Noted: 2023-04-05

## 2023-04-05 PROBLEM — G45.9 TIA (TRANSIENT ISCHEMIC ATTACK): Status: ACTIVE | Noted: 2023-04-05

## 2023-04-05 PROBLEM — I50.22 CHRONIC SYSTOLIC CONGESTIVE HEART FAILURE: Status: ACTIVE | Noted: 2023-04-05

## 2023-04-05 NOTE — PROGRESS NOTES
C3 nurse 2nd attempt to contact Juvenal Bernal for a TCC post hospital discharge follow up call. No answer. The patient does not have a scheduled HOSFU appointment, and the pt does not have an [facility] PCP.

## 2023-04-06 PROBLEM — R50.9 FEVER: Status: RESOLVED | Noted: 2023-04-01 | Resolved: 2023-04-06

## 2023-04-06 LAB
BACTERIA BLD CULT: NORMAL
BACTERIA BLD CULT: NORMAL

## 2023-04-10 ENCOUNTER — TELEPHONE (OUTPATIENT)
Dept: INTERNAL MEDICINE | Facility: CLINIC | Age: 68
End: 2023-04-10

## 2023-04-10 NOTE — TELEPHONE ENCOUNTER
----- Message from Ayleen Andrew sent at 4/10/2023  9:02 AM CDT -----  Contact: [phone] pt's sister Juhi Reynaga is calling to see if there is anyway that pt can be seen tomorrow. Pt has an another appt there on tomorrow and she would like to try to have this appt tomorrow as well. There were no available appts for tomorrow listed. She would like a callback.              Thank you

## 2023-04-10 NOTE — TELEPHONE ENCOUNTER
----- Message from Ayleen Andrew sent at 4/10/2023  9:02 AM CDT -----  Contact: [phone] pt's sister Juhi Reynaga is calling to see if there is anyway that pt can be seen tomorrow. Pt has an another appt there on tomorrow and she would like to try to have this appt tomorrow as well. There were no available appts for tomorrow listed. She would like a callback.              Thank you

## 2023-04-10 NOTE — PROGRESS NOTES
[facility]  Urologic Oncology Clinic Note        Date of Service: 4/11/2023      Chief Complaint/Reason for Consultation: Bladder Mass    Requesting Provider:   Self      History of Present Illness:   Patient 67 y.o. male presents with incidental finding on CT scan of possible bladder mass. Patient known to Dr. Jama in the past for kidney stones.     He has a past medical history significant for Afib, HTN, CHF. He presented to the ED on 4/5/2023 with TIA symptoms at which time CT PE protocol showed possible bladder mass.     No family hx of bladder cancer.   Smoking status -- never  Never had gross hematuria.      Urologic History:     4/5/2023 -- CT PE protocol -- 2.25 cm spiculated calcified lesion on left bladder trigone with bladder wall thickening, no renal masses, left renal simple cyst measuring approx 14 HU    Patient Active Problem List    Diagnosis Date Noted    TIA (transient ischemic attack) 04/05/2023    Chronic systolic congestive heart failure 04/05/2023    MYLES (acute kidney injury) 04/05/2023    Acute hypoxemic respiratory failure 04/01/2023    Acute exacerbation of CHF (congestive heart failure) 04/01/2023    Right ureteral stone 12/22/2017    Kidney stone 11/22/2017    UTI (urinary tract infection) 09/22/2017    Elevated PSA 09/22/2017    Calcium kidney stone 09/22/2017    MEHNAZ (obstructive sleep apnea)     Lesion of skeletal muscle 02/26/2016    Mass of left thigh 01/15/2016    Atrial flutter 12/22/2015    Soft tissue mass 12/22/2015    Essential hypertension 12/04/2015    Erectile dysfunction due to arterial insufficiency 12/04/2015    Heart murmur 12/04/2015    Obesity, Class II, BMI 35-39.9, with comorbidity 12/04/2015          Review of patient's allergies indicates:  No Known Allergies     Past Medical History:   Diagnosis Date    Anticoagulant long-term use     Arthritis     knee    Atrial fibrillation     Hypertension       Past Surgical History:   Procedure Laterality Date    TOTAL  "HIP ARTHROPLASTY Right 2013      History reviewed. No pertinent family history.   Social History     Tobacco Use    Smoking status: Never    Smokeless tobacco: Never   Substance Use Topics    Alcohol use: No     Alcohol/week: 0.0 standard drinks        Review of Systems   Constitutional:  Negative for activity change, fatigue and fever.   HENT:  Negative for congestion.    Respiratory:  Negative for cough.    Cardiovascular:  Negative for chest pain.   Gastrointestinal:  Negative for abdominal pain.   Genitourinary:  Negative for dysuria and hematuria.           OBJECTIVE:     Vitals:    04/11/23 1045   BP: (!) 196/112   Pulse: 74   Weight: 114.1 kg (251 lb 8 oz)   Height: 5' 9" (1.753 m)          General Appearance: Alert, cooperative, no distress  Head: Normocephalic  Eyes: Clear conjunctiva  Neck: No obvious LND or JVD  Lungs: Normal chest excursion, no accessory muscle use  Chest: Regular rate rhythm by palpation, no pedal edema  Abdomen: Soft, non-tender, non-distended  Male Genitalia: Deferred  Extremities: Atraumatic   Lymph Nodes: No appreciable lymph adenopathy  Neurologic: No gross gait, motor or sensory deficits            LAB:        CMP  Sodium   Date Value Ref Range Status   04/06/2023 140 136 - 145 mmol/L Final     Potassium   Date Value Ref Range Status   04/06/2023 3.6 3.5 - 5.1 mmol/L Final     Chloride   Date Value Ref Range Status   04/06/2023 104 95 - 110 mmol/L Final     CO2   Date Value Ref Range Status   04/06/2023 32 (H) 23 - 29 mmol/L Final     Glucose   Date Value Ref Range Status   04/06/2023 97 70 - 110 mg/dL Final     BUN   Date Value Ref Range Status   04/06/2023 38 (H) 2 - 20 mg/dL Final     Creatinine   Date Value Ref Range Status   04/06/2023 1.66 (H) 0.50 - 1.40 mg/dL Final     Calcium   Date Value Ref Range Status   04/06/2023 8.6 (L) 8.7 - 10.5 mg/dL Final     Total Protein   Date Value Ref Range Status   04/06/2023 6.7 6.0 - 8.4 g/dL Final     Albumin   Date Value Ref Range " Status   2023 3.4 (L) 3.5 - 5.2 g/dL Final     Total Bilirubin   Date Value Ref Range Status   2023 1.3 (H) 0.1 - 1.0 mg/dL Final     Comment:     For infants and newborns, interpretation of results should be based  on gestational age, weight and in agreement with clinical  observations.    Premature Infant recommended reference ranges:  Up to 24 hours.............<8.0 mg/dL  Up to 48 hours............<12.0 mg/dL  3-5 days..................<15.0 mg/dL  6-29 days.................<15.0 mg/dL       Alkaline Phosphatase   Date Value Ref Range Status   2023 75 38 - 126 U/L Final     AST   Date Value Ref Range Status   2023 42 15 - 46 U/L Final     ALT   Date Value Ref Range Status   2023 34 10 - 44 U/L Final     Anion Gap   Date Value Ref Range Status   2023 4 (L) 8 - 16 mmol/L Final     eGFR   Date Value Ref Range Status   2023 45.5 (A) >60 mL/min/1.73 m^2 Final     Lab Results   Component Value Date    WBC 8.05 2023    HGB 14.9 2023    HCT 48.0 2023    MCV 75 (L) 2023     2023     Lab Results   Component Value Date    PSA 2.7 2016    PSA 0.57 2009    PSADIAG 2.7 2017    PSADIAG 4.7 (H) 2017           IMAGIN/1/2023  CTA CHEST NON CORONARY (PE STUDIES); CT ABDOMEN PELVIS WITH CONTRAST     CLINICAL HISTORY:  Pulmonary embolism (PE) suspected, high prob;; Abdominal abscess/infection suspected;Epigastric pain;     TECHNIQUE:  Following the intravenous administration of 100 cc of Omnipaque 350 contrast material, 1.25 mm contiguous axial images were acquired through the chest utilizing the CT pulmonary angiogram protocol.  2-D coronal and sagittal reconstructed images of the chest and sagittal oblique MIP images of the pulmonary arterial system were generated from the source data.     COMPARISON:  Chest x-ray, same day, CT abdomen and pelvis, 10/01/2010     FINDINGS:  Pulmonary arterial system: This is a good  quality examination for the evaluation of pulmonary thromboembolism with good opacification of the pulmonary arteries to the level of the segmental branches of the pulmonary arterial system. There is no evidence of acute pulmonary thromboembolism. No large saddle pulmonary embolism, enlargement of the main pulmonary artery, or secondary findings of right ventricular strain.     Aorta and heart: The heart is enlarged in size.  Multi chamber with calcifications in the mitral valve annulus.  Is no pericardial fluid.  No thoracic aortic aneurysm.  No thoracic aortic dissection.     Airways: Unremarkable.     Lymph nodes: No pathologically enlarged lymph nodes in the chest or axilla.     Lungs: Scattered ground-glass opacities with no nodular consistency.  No lobar consolidation.  No dominant nodule.  Otherwise, lungs are clear with no evidence of airspace consolidation, mass, or bronchiectasis.  No emphysematous lung architecture.     Pleura: No significant volume of pleural fluid or pneumothorax.  No pleural based masses.     Abdomen and pelvis: The liver, spleen, pancreas, adrenal glands and kidneys appear unremarkable, allowing for bilateral low-density lesions in the kidneys compatible with cortical cysts.     The abdominal aorta and vena cava are normal in caliber with minimal atherosclerotic disease in the aorta and iliac system.     The loops of large and small intestines appear unremarkable.  There is no free air or inflammation.     The urinary bladder contains a 2.25 cm spiculated calcified K shin/calcified lesion in the left side of the trigone with thickening along the posterior trigone.  The prostate gland is mildly enlarged with median lobe prominence but no invagination into the urinary bladder.     No pelvic mass or free fluid is evident.     Degenerative changes with sclerotic bone island again noted in the iliac wing left.  Lumbar stenosis at multiple levels due to a short pedicles and hypertrophic  posterior element changes.     Total hip replacement on the right.     Impression:     1. No evidence of acute pulmonary thromboembolism.  2. Scattered ground-glass opacities in the lungs.  Correlate for early pulmonary edema or pneumonitis.  3. No lobar consolidation or effusion.  4. Cardiac chamber enlargement.  5. Calcified spiculated lesion in the urinary bladder toward the left with thickened trigone.  While this could represent a bladder calculus, calcified bladder mass with bladder wall thickening is difficult to exclude.  Urology follow-up and interrogation is urged if not already performed.  6.  This report was flagged in Epic as abnormal.        Electronically signed by: Shine Cano  Date:                                            04/01/2023  Time:                                           22:31        ASSESSMENT/PLAN:     66 yo M w incidental finding of possible bladder mass on CT for PE. Here for evaluation and management.    Plan:      We discussed the finding of a bladder mass on CT diagnostic imaging in the setting of no hematuria. We discussed that the primary concern here is a diagnosis of bladder cancer, which can be an aggressive tumor. Other potential causes of bladder mass were reviewed, including benign causes. The only way to diagnose bladder cancer at current would be tissue sampling, and the most reliable way to do this would be with a biopsy. We would inspect the bladder in the clinic on cystoscopy to determine if this is possible in the outpatient setting in a safe manner or if the patient needs to plan for a formal biopsy with transurethral resection of the bladder tumor in the operating room. We reviewed the risks, benefits, and alternatives of cystoscopy with possible biopsy, with risks including but not limited to pain, bleeding, infection, damage to the urethra, bladder, or other pelvic organs with possible scar tissue formation which can impair voiding function in the future.  Percutaneous biopsy is possible but not preferred due to the concern of seeding tumor cells and possible injury to the bladder. Cytology can be obtained noninvasively but this has less diagnostic yield. Enhanced imaging is not yet sophisticated enough to discern between different etiologies of bladder masses. After our review, the patient wished to proceed with in office cystoscopy       Prostate Cancer Screening  Today we discussed the AUA guidelines regarding prostate cancer screening. The patient has not had PSA testing since 2017. I advised to continue screening. Will obtain PSA test today.      The total time for the new patient visit was at least 30 minutes in both face-to-face and non-face-to-face activities, which included chart review, interpretation of results, counseling, education, ordering meds/tests/procedures, and/or coordination of care.         Edgard Soliz MD  Urologic Oncology  P: [phone]

## 2023-04-10 NOTE — TELEPHONE ENCOUNTER
Informed pt sister that dr fields is not in clinic tomorrow.  Will keep appt.  Stated that she was trying to get a PCP for her brother.  I informed her that she can get that appt scheduled when he comes for the appt.

## 2023-04-11 ENCOUNTER — LAB VISIT (OUTPATIENT)
Dept: LAB | Facility: HOSPITAL | Age: 68
End: 2023-04-11
Attending: STUDENT IN AN ORGANIZED HEALTH CARE EDUCATION/TRAINING PROGRAM

## 2023-04-11 ENCOUNTER — OFFICE VISIT (OUTPATIENT)
Dept: UROLOGY | Facility: CLINIC | Age: 68
End: 2023-04-11

## 2023-04-11 VITALS
HEART RATE: 74 BPM | DIASTOLIC BLOOD PRESSURE: 112 MMHG | HEIGHT: 69 IN | BODY MASS INDEX: 37.25 KG/M2 | SYSTOLIC BLOOD PRESSURE: 196 MMHG | WEIGHT: 251.5 LBS

## 2023-04-11 DIAGNOSIS — Z12.5 PROSTATE CANCER SCREENING: Primary | ICD-10-CM

## 2023-04-11 DIAGNOSIS — N40.0 BENIGN PROSTATIC HYPERPLASIA, UNSPECIFIED WHETHER LOWER URINARY TRACT SYMPTOMS PRESENT: ICD-10-CM

## 2023-04-11 LAB — COMPLEXED PSA SERPL-MCNC: 3 NG/ML (ref 0–4)

## 2023-04-11 PROCEDURE — 99203 PR OFFICE/OUTPT VISIT, NEW, LEVL III, 30-44 MIN: ICD-10-PCS | Mod: S$PBB,,, | Performed by: STUDENT IN AN ORGANIZED HEALTH CARE EDUCATION/TRAINING PROGRAM

## 2023-04-11 PROCEDURE — 99213 OFFICE O/P EST LOW 20 MIN: CPT | Mod: PBBFAC | Performed by: STUDENT IN AN ORGANIZED HEALTH CARE EDUCATION/TRAINING PROGRAM

## 2023-04-11 PROCEDURE — 36415 COLL VENOUS BLD VENIPUNCTURE: CPT | Performed by: STUDENT IN AN ORGANIZED HEALTH CARE EDUCATION/TRAINING PROGRAM

## 2023-04-11 PROCEDURE — 84153 ASSAY OF PSA TOTAL: CPT | Performed by: STUDENT IN AN ORGANIZED HEALTH CARE EDUCATION/TRAINING PROGRAM

## 2023-04-11 PROCEDURE — 99999 PR PBB SHADOW E&M-EST. PATIENT-LVL III: CPT | Mod: PBBFAC,,, | Performed by: STUDENT IN AN ORGANIZED HEALTH CARE EDUCATION/TRAINING PROGRAM

## 2023-04-11 PROCEDURE — 99999 PR PBB SHADOW E&M-EST. PATIENT-LVL III: ICD-10-PCS | Mod: PBBFAC,,, | Performed by: STUDENT IN AN ORGANIZED HEALTH CARE EDUCATION/TRAINING PROGRAM

## 2023-04-11 PROCEDURE — 99203 OFFICE O/P NEW LOW 30 MIN: CPT | Mod: S$PBB,,, | Performed by: STUDENT IN AN ORGANIZED HEALTH CARE EDUCATION/TRAINING PROGRAM

## 2023-04-11 NOTE — PHYSICIAN QUERY
PT Name: Juvenal Bernal  MR #: [id number]     DOCUMENTATION CLARIFICATION     CDS/: Coretta Olivas RN, CCDS              Contact information: [email or web address]  This form is a permanent document in the medical record.     Query Date: April 11, 2023    By submitting this query, we are merely seeking further clarification of documentation to reflect the severity of illness of your patient. Please utilize your independent clinical judgment when addressing the question(s) below.    The Medical Record contains the following:   Indicators   Supporting Clinical Findings Location in Medical Record   X Hypertension or hypertensive documented .  new onset heart failure or cardiac issues related to uncontrolled hypertension    Patient was admitted to hospital medicine for further evaluation and management of new onset heart failure vs pulmonary edema 2/2 hypertensive emergency    Essential hypertension 4/1 ed note                4/2 h/p   X Acute/Chronic condition(s) Acute exacerbation of CHF   Hypoxic Respiratory failure which is Acute  Atrial flutter    admitted for acute hypoxic respiratory failure suspected due to new onset heart failure 4/2 h/p        4/3 prog note   X Vital signs 260/140, hr 105  222/100, hr-84  200/113, hr 81  179/115, hr 80 4/1 ed note  4/1 vs  4/1 vs  4/1 vs    Lab findings     X Radiology findings Findings consistent with pulmonary edema secondary to CHF.   4/1 cxr   X Treatment/Medication HTN treated with po lisinopril and HCTZ    lisinopril 40mg  -HCTZ 25mg  -PRN IV hydral for now 4/1 ed note    4/1 h/p    Other       The clinical guidelines noted are only a system guideline. It does not replace the provider's clinical judgment.  Provider, please clarify type of Hypertension.  [  X ] Essential (primary) hypertension   [   ] Hypertensive emergency - Severe hypertension (SBP>180 and/or DBP>120mmHg) with signs or symptoms of new or worsening end-organ damage (i.e. hypertensive encephalopathy,  retinal hemorrhage, papilledema, acute kidney injury, stroke, heart attack, heart failure, kidney failure)   [   ] Other cardiovascular condition (please specify): __________         Please document in your progress notes daily for the duration of treatment until resolved, and include in your discharge summary.    References:    LACEY Horner MD, PhD, & FARRUKH Malik MD, FACP, FCCP, FCCM, FRSM. (2020, June 25). Evaluation and treatment of hypertensive emergencies in adults (MIN Lozano MD, LACEY Mcconnell MD, & FARRUKH Esteban MD, MSc, Eds.). Retrieved October 22, 2020, from https://www.[employer]/contents/evaluation-and-treatment-bx-qsfblqkkdfog-yeezinbioka-in-adults?search=hypertensive%20emergency&source=search_result&selectedTitle=1~150&usage_type=default&display_rank=1    FARRUKH Malik MD, FACP, FCCP, FCC, FR, & LACEY Horner MD, PhD. (2020, October 14). Management of severe asymptomatic hypertension (hypertensive urgencies) in adults (MIN Lozano MD, LACEY Mcconnell MD, & FARRUKH Esteban MD, MSc, Eds.). Retrieved October 22, 2020, from https://www.[employer]/contents/management-of-severe-asymptomatic-hypertension-hypertensive-urgencies-in-adults?search=hypertensive%20urgency&source=search_result&selectedTitle=1~41&usage_type=default&display_rank=1    Form No. 95730

## 2023-04-11 NOTE — PHYSICIAN QUERY
PT Name: Juvenla Bernal  MR #: [id number]     DOCUMENTATION CLARIFICATION     CDS/: Coretta Olivas RN, CCDS              Contact information: [email or web address]  This form is a permanent document in the medical record.     Query Date: April 11, 2023    By submitting this query, we are merely seeking further clarification of documentation.  Please utilize your independent clinical judgment when addressing the question(s) below.    The Medical Record contains the following   Indicators Supporting Clinical Findings Location in Medical Record   X Heart Failure documented new onset heart failure vs pulmonary edema 2/2 hypertensive emergency.     Acute exacerbation of CHF     Patient admitted for acute hypoxic respiratory failure suspected due to new onset heart failure 4/1 ed note      4/2 h/p    4/3 prog note   X BNP 1,564 4/1 Lab     X EF/Echo The left ventricle is mildly enlarged with marked concentric hypertrophy and moderately decreased systolic function. The estimated ejection fraction is 30-35%.  There is an increased density to the thickened LV myocardium suggestive of an infiltrative process (e.g., amyloidosis)  Normal right ventricular size with normal right ventricular systolic function.  Severe biatrial enlargement.  Trivial pericardial effusion.  The pulmonary artery systolic pressure could not be estimated due to the lack of tricuspid insufficiency.  Normal central venous pressure (3 mmHg).  Atrial fibrillation observed. 4/3 echo   X Radiology findings Findings consistent with pulmonary edema secondary to CHF. 4/1 cxr   X Subjective/Objective Respiratory Conditions  Patient reports that he has been short of breath for the last few weeks but is worse over the last 2 days. 4/1 ed note    Recent/Current MI      Heart Transplant, LVAD     X Edema, JVD Continued edema of extremities and JVD 4/2 h/p    Ascites     X Diuretics/Meds Patient was treated with 1 x IV lasix 40mg    IV Lasix 40mg BID 4/1 ed  note    4/2 h/p    Other Treatment      Other       Heart failure is a clinical diagnosis which includes symptomatic fluid retention, elevated intracardiac pressures, and/or the inability of the heart to deliver adequate blood flow.    Heart Failure with reduced Ejection Fraction (HFrEF) or Systolic Heart Failure (loses ability to contract normally, EF is <40%)    Heart Failure with preserved Ejection Fraction (HFpEF) or Diastolic Heart Failure (stiff ventricles, does not relax properly, EF is >50%)     Heart Failure with Combined Systolic and Diastolic Failure (stiff ventricles, does not relax properly and EF is <50%)    Mid-range or mildly reduced ejection fraction (HFmrEF) is classified as systolic heart failure.  Congestive heart failure with a recovered EF is classified as Diastolic Heart Failure.  Common clues to acute exacerbation:  Rapidly progressive symptoms (w/in 2 weeks of presentation), using IV diuretics, using supplemental O2, pulmonary edema on Xray, new or worsening pleural effusion, +JVD or other signs of volume overload, MI w/in 4 weeks, and/or BNP >500  The clinical guidelines noted are only system guidelines, and do not replace the providers clinical judgment.    Provider, please specify the Type and acuity for New Onset CHF.    [  X ]  Acute Systolic Heart Failure (HFrEF or HFmrEF) - new diagnosis   [   ]  Other (please specify): ___________________________________       Please document in your progress notes daily for the duration of treatment until resolved and include in your discharge summary.    References:  American Heart Association editorial staff. (2017, May). Ejection Fraction Heart Failure Measurement. American Heart Association. https://www.heart.org/en/health-topics/heart-failure/diagnosing-heart-failure/ejection-fraction-heart-failure-measurement#:~:text=Ejection%20fraction%20(EF)%20is%20a,pushed%20out%20with%20each%20heartbeARTEM Skaggs (2020, December 15). Heart failure  with preserved ejection fraction: Clinical manifestations and diagnosis. UpToDate. https://www.[employer]/contents/heart-failure-with-preserved-ejection-fraction-clinical-manifestations-and-diagnosis.  ICD-10-CM/PCS Coding Clinic Third Quarter ICD-10, Effective with discharges: September 8, 2020 [facility] § Heart failure with mid-range or mildly reduced ejection fraction (2020).  ICD-10-CM/PCS Coding Clinic Third Quarter ICD-10, Effective with discharges: September 8, 2020 [facility] § Heart failure with recovered ejection fraction (2020).  Form No. [id number]

## 2023-04-12 ENCOUNTER — PROCEDURE VISIT (OUTPATIENT)
Dept: UROLOGY | Facility: CLINIC | Age: 68
End: 2023-04-12

## 2023-04-12 VITALS
BODY MASS INDEX: 37.38 KG/M2 | TEMPERATURE: 98 F | HEART RATE: 64 BPM | SYSTOLIC BLOOD PRESSURE: 171 MMHG | RESPIRATION RATE: 18 BRPM | DIASTOLIC BLOOD PRESSURE: 79 MMHG | WEIGHT: 252.38 LBS | HEIGHT: 69 IN

## 2023-04-12 DIAGNOSIS — N40.0 BENIGN PROSTATIC HYPERPLASIA, UNSPECIFIED WHETHER LOWER URINARY TRACT SYMPTOMS PRESENT: ICD-10-CM

## 2023-04-12 PROCEDURE — 87086 URINE CULTURE/COLONY COUNT: CPT | Performed by: STUDENT IN AN ORGANIZED HEALTH CARE EDUCATION/TRAINING PROGRAM

## 2023-04-12 PROCEDURE — 52000 PR CYSTOURETHROSCOPY: ICD-10-PCS | Mod: S$PBB,,, | Performed by: STUDENT IN AN ORGANIZED HEALTH CARE EDUCATION/TRAINING PROGRAM

## 2023-04-12 PROCEDURE — 52000 CYSTOURETHROSCOPY: CPT | Mod: PBBFAC | Performed by: STUDENT IN AN ORGANIZED HEALTH CARE EDUCATION/TRAINING PROGRAM

## 2023-04-12 PROCEDURE — 52000 CYSTOURETHROSCOPY: CPT | Mod: S$PBB,,, | Performed by: STUDENT IN AN ORGANIZED HEALTH CARE EDUCATION/TRAINING PROGRAM

## 2023-04-12 RX ORDER — LIDOCAINE HYDROCHLORIDE 20 MG/ML
JELLY TOPICAL ONCE
Status: COMPLETED | OUTPATIENT
Start: 2023-04-12 | End: 2023-04-12

## 2023-04-12 RX ADMIN — LIDOCAINE HYDROCHLORIDE: 20 JELLY TOPICAL at 11:04

## 2023-04-12 NOTE — PATIENT INSTRUCTIONS
What to Expect After a Cystoscopy  For the next 24-48 hours, you may feel a mild burning when you urinate. This burning is normal and expected. Drink plenty of water to dilute the urine to help relieve the burning sensation. You may also see a small amount of blood in your urine after the procedure.    Unless you are already taking antibiotics, you may be given an antibiotic after the test to prevent infection.    Signs and Symptoms to Report  Call the [facility] at [phone] if you develop any of the following:  Fever of 101 degrees or higher  Chills or persistent bleeding  Inability to urinate

## 2023-04-12 NOTE — PROCEDURES
Office Cystoscopy Procedure Note    Date of Procedure: 04/12/2023    Indication:   Bladder Mass vs Bladder Stone       Informed consent:  The risks, benefits, complications, treatment options, and expected outcomes were discussed with the patient. The patient concurred with the proposed plan and provided informed consent.     Anesthesia: Lidocaine jelly 2%     Antibiotic prophylaxis: None     Procedure:  The patient was placed in the lithotomy position, was prepped and draped in the usual manner using sterile technique, and 2% lidocaine jelly instilled into the urethra.  A 17 F flexible cystoscope was then inserted into the urethra and the urethra and bladder carefully examined.  The following findings were noted:       Findings:   Urethra: normal     Prostate: trilobar hypertrophy , no large median lobe    Bladder: normal, some saccules, large bladder stone overlying the left UO    Ureteral orifices:       -Right: Not identified       -Left: Not identified     Other findings:     None        Specimens: None                   Complications: None; patient tolerated the procedure well            Disposition: Home after brief observation     Condition: Stable     Plan: OR for cystoscopy and cystolithalopaxy    Attending Attestation:      I personally performed the procedure.       Edgard Soliz MD  Urologic Oncology  P: [phone]

## 2023-04-13 LAB — BACTERIA UR CULT: NO GROWTH

## 2023-04-14 ENCOUNTER — OFFICE VISIT (OUTPATIENT)
Dept: CARDIOLOGY | Facility: CLINIC | Age: 68
End: 2023-04-14

## 2023-04-14 VITALS
WEIGHT: 247 LBS | SYSTOLIC BLOOD PRESSURE: 156 MMHG | OXYGEN SATURATION: 98 % | DIASTOLIC BLOOD PRESSURE: 86 MMHG | HEART RATE: 55 BPM | BODY MASS INDEX: 36.48 KG/M2

## 2023-04-14 DIAGNOSIS — G45.9 TIA (TRANSIENT ISCHEMIC ATTACK): ICD-10-CM

## 2023-04-14 DIAGNOSIS — I10 ESSENTIAL HYPERTENSION: ICD-10-CM

## 2023-04-14 DIAGNOSIS — N20.0 KIDNEY STONE: ICD-10-CM

## 2023-04-14 DIAGNOSIS — I50.22 CHRONIC SYSTOLIC CONGESTIVE HEART FAILURE: ICD-10-CM

## 2023-04-14 DIAGNOSIS — I48.19 PERSISTENT ATRIAL FIBRILLATION: ICD-10-CM

## 2023-04-14 DIAGNOSIS — G47.33 OSA (OBSTRUCTIVE SLEEP APNEA): ICD-10-CM

## 2023-04-14 DIAGNOSIS — N17.9 AKI (ACUTE KIDNEY INJURY): ICD-10-CM

## 2023-04-14 PROBLEM — I48.91 ATRIAL FIBRILLATION: Status: ACTIVE | Noted: 2023-04-14

## 2023-04-14 PROCEDURE — 99204 PR OFFICE/OUTPT VISIT, NEW, LEVL IV, 45-59 MIN: ICD-10-PCS | Mod: S$PBB,25,,

## 2023-04-14 PROCEDURE — 99204 OFFICE O/P NEW MOD 45 MIN: CPT | Mod: S$PBB,25,,

## 2023-04-14 PROCEDURE — 93005 ELECTROCARDIOGRAM TRACING: CPT | Mod: PBBFAC,PN | Performed by: INTERNAL MEDICINE

## 2023-04-14 PROCEDURE — 99213 OFFICE O/P EST LOW 20 MIN: CPT | Mod: PBBFAC,PN

## 2023-04-14 PROCEDURE — 99999 PR PBB SHADOW E&M-EST. PATIENT-LVL III: ICD-10-PCS | Mod: PBBFAC,,,

## 2023-04-14 PROCEDURE — 99999 PR PBB SHADOW E&M-EST. PATIENT-LVL III: CPT | Mod: PBBFAC,,,

## 2023-04-14 PROCEDURE — 93010 EKG 12-LEAD: ICD-10-PCS | Mod: S$PBB,,, | Performed by: INTERNAL MEDICINE

## 2023-04-14 PROCEDURE — 93010 ELECTROCARDIOGRAM REPORT: CPT | Mod: S$PBB,,, | Performed by: INTERNAL MEDICINE

## 2023-04-14 NOTE — ASSESSMENT & PLAN NOTE
Uncontrolled High Blood Pressure (Established)    Your blood pressure was unusually high today. This can occur if you’ve missed doses of your blood pressure medicine. Or it can happen if you are taking other medicines. These include some asthma inhalers, decongestants, diet pills, and street drugs like cocaine and amphetamine.  Other causes include:  · Weight gain  · More salt in your diet  · Smoking  · Caffeine  Your blood pressure can also rise if you are emotionally upset or in intense pain. It may go back to normal after a period of rest.  Blood pressure measurements are given as 2 numbers. Systolic blood pressure is the upper number. This is the pressure when the heart contracts. Diastolic blood pressure is the lower number. This is the pressure when the heart relaxes between beats. You will see your blood pressure readings written together. For example, a person with a systolic pressure of 118 and a diastolic pressure of 78 will have 118/78 written in the medical record. To be high blood pressure, the numbers must be higher when tested over a period of time.  Blood pressure is categorized as normal, elevated, or stage 1 or stage 2 high blood pressure:  · Normal blood pressure is systolic of less than 120 and diastolic of less than 80 (120/80)  · Elevated blood pressure is systolic of 120 to 129 and diastolic less than 80  · Stage 1 high blood pressure is systolic is 130 to 139 or diastolic between 80 to 89  · Stage 2 high blood pressure is when systolic is 140 or higher or the diastolic is 90 or higher  Uncontrolled high blood pressure can cause serious health problems. It raises your risk for heart attack, stroke, and heart failure. In general, if you have high blood pressure, keeping your blood pressure below 130/80 mmHg may help prevent these problems. Your healthcare provider may prescribe medicine to help control blood pressure if lifestyle changes are not enough.  Home care  It’s important to take  -encouraged weight loss  -discussed health risk associated w obesity    steps to lower your blood pressure. If you are taking blood pressure medicine, the guidelines below may help you need less or no medicines in the future.  · Start a weight-loss program if you are overweight.  · Cut back on the amount of salt in your diet:  ? Avoid high-salt foods like olives, pickles, smoked meats, and salted potato chips.  ? Don’t add salt to your food at the table.  ? Use only small amounts of salt when cooking.  · Start an exercise program. Talk with your healthcare provider about what exercise program is best for you. It doesn’t have to be difficult. Even brisk walking for 20 minutes 3 times a week is a good form of exercise.  · Avoid medicines that stimulates the heart. This includes many over-the-counter cold and sinus decongestant pills and sprays, as well as diet pills. Check the warnings about high blood pressure on the label. Before purchasing any over-the-counter medicines or supplements, always ask the pharmacist about the product's potential interaction with your high blood pressure and your medicines.  · Stimulants such as amphetamine or cocaine could be lethal for someone with high blood pressure. Never take these.  · Limit how much caffeine you drink. Or switch to noncaffeinated beverages.  · Stop smoking. If you are a long-time smoker, this can be hard. Enroll in a stop-smoking program to make it more likely that you will succeed. Talk with your provider about ways to quit.  · Learn how to handle stress better. This is an important part of any program to lower blood pressure. Learn ways to relax. These include meditation, yoga, and biofeedback.  · If medicines were prescribed, take them exactly as directed. Missing doses may cause your blood pressure to get out of control.  · If you miss a dose or doses of your medicines, check with your healthcare provider or pharmacist about what to do.  · Consider buying an automatic blood pressure machine. Your provider may recommend a certain  type. You can get one of these at most pharmacies. Measure your blood pressure twice a day, in the morning, and in the late afternoon. Keep a written record of your home blood pressure readings and take the record to your medical appointments.  Here are some additional guidelines on home blood pressure monitoring from the American [employer] Association.  · Don't smoke or drink coffee for 30 minutes  · Go to the bathroom before the test.  · Relax for 5 minutes before taking the measurement.  · Sit correctly. Be sure your back is supported. Don't sit on a couch or soft chair. Uncross your feet and place them flat on the floor. Place your arm on a solid, flat surface like a table with the upper arm at heart level. Make certain the middle of the cuff is directly above the bend of the elbow. Check the monitor's instruction manual for an illustration.  · Take multiple readings. When you measure, take 2 or 3 readings one minute apart and record all of the results.  · Take your blood pressure at the same time every day, or as your healthcare provider recommends.  · Record the date, time, and blood pressure reading.  · Take the record with you to your next appointment. If your blood pressure monitor has a built-in memory, simply take the monitor with you to your next appointment.  · Call your provider if you have several high readings. Don't be frightened by a single high reading, but if you get several high readings, check in with your healthcare provider.  · Note: When blood pressure reaches a systolic (top number) of 180 or higher or a diastolic (bottom number) of 110 or higher, emergency medical treatment is required. Call your healthcare provider immediately.  Follow-up care  Regular visits to your own healthcare provider for blood pressure and medicine checks are an important part of your care. Make a follow-up appointment as directed. Bring the record of your home blood pressure readings to the appointment.  When to seek  medical advice  Call your healthcare provider right away if any of these occur:  · Blood pressure reaches a systolic (top number) of 180 or higher or diastolic (bottom number) of 110 or higher, emergency medical treatment is required.  · Chest, arm, shoulder, neck, or upper back pain  · Shortness of breath  · Severe headache  · Throbbing or rushing sound in the ears  · Nosebleed  · Extreme drowsiness, confusion, or fainting  · Dizziness or dizziness with spinning sensation (vertigo)  · Weakness in an arm or leg or on one side of the face  · Trouble speaking or seeing   Date Last Reviewed: 1/1/2017 © 2000-2018 [employer]. [address] [place] [address], [place] [address]. All rights reserved. This information is not intended as a substitute for professional medical care. Always follow your healthcare professional's instructions.        Kidney Disease: Avoiding High-Sodium Foods  Sodium is a mineral that the body needs in small amounts. Sodium is found in table salt. Table salt is sodium chloride. Most people eat far more salt than they need. There are 2 main reasons for this. Salt is present in high amounts in most processed foods (pre-prepared foods like breakfast cereals, cookies, and pickles) and in all restaurant foods. In other words, if you are not cooking from fresh ingredients at home, you are very likely eating more salt than you need. When sodium intake is too high, it can increase thirst and cause the body to retain fluid. This can increase blood pressure and strain the kidneys. If you have chronic kidney disease, try not to eat the foods listed here, unless the label states that they are made without salt. People with chronic kidney disease should restrict their sodium intake to less than 1,500 mg of sodium (3,800 mg of table salt) each day.    · Canned and processed foods, such as gravies, instant cereal, packaged noodles and potato mixes, olives, pickles, soups, vegetables  · Cheeses, such as  American, Blue, Parmesan, Roquefort  · Cured meats, such as peterson, beef jerky, bologna, corned beef, ham, hot dogs, sandwich meats, sausages  · Fast foods, such as burritos, fish sandwiches, milk shakes, salted French fries, tacos  · Frozen foods, such as meat pies, TV dinners, waffles  · Salted snacks, such as chips, crackers, peanut popcorn, pretzels, and nuts  · Other packaged items, such as antacids, baking soda, bouillon, catsup, lite salt, relish, salted butter and margarine, soy and teriyaki sauce, steak sauce, vegetable juices  Date Last Reviewed: 2/1/2017  © 7078-2892 [employer]. [address] [place] [address]. All rights reserved. This information is not intended as a substitute for professional medical care. Always follow your healthcare professional's instructions.

## 2023-04-14 NOTE — PROGRESS NOTES
Subjective:    Patient ID:  Juvenal Bernal is a 67 y.o. male who presents for evaluation of Hospital Follow Up and Establish Care      PCP: Primary Doctor No     Referring Provider:     HPI: Patient is a 66 yo M w/H of HTN, A-fib, HFrEF ( LVEF 30-35%), MEHNAZ, obesity  who presents today to establish care and post hospital admission. Recent admission 4/5/23-4/6/23 w c/o blurred vision and speech difficulty 2/2 MYLES as a result of decreased hydration and diuretics for HF management. Symptoms improved with hydration. CT head negative, Carotid doppler showed no significant stenosis. Coreg for HF management decreased 2/2 bradycardia. Discharged home on medical therapy w Cardiology f/u. Incidental finding bladder mass on Ct scan and is currently following with Urology. He denies CP, SOB, palpitations, orthopnea, PND, pre-syncope, LOC, swelling, or claudication. He notes snoring. Patient noted medication compliance since discharge. He does not monitorn his home BP. Patient also notes dietary noncompliance with low salt diet. He walks daily.     Past Medical History:   Diagnosis Date    Anticoagulant long-term use     Arthritis     knee    Atrial fibrillation     Hypertension      Past Surgical History:   Procedure Laterality Date    TOTAL HIP ARTHROPLASTY Right 2013     Social History     Socioeconomic History    Marital status:    Tobacco Use    Smoking status: Never    Smokeless tobacco: Never   Substance and Sexual Activity    Alcohol use: No     Alcohol/week: 0.0 standard drinks    Sexual activity: Yes     Partners: Female     No family history on file.    Review of patient's allergies indicates:  No Known Allergies    Medication List with Changes/Refills   Current Medications    APIXABAN (ELIQUIS) 5 MG TAB    Take 1 tablet (5 mg total) by mouth 2 (two) times daily.    ASPIRIN (ECOTRIN) 81 MG EC TABLET    Take 1 tablet (81 mg total) by mouth once daily.    ATORVASTATIN (LIPITOR) 40 MG TABLET    Take 1 tablet (40  mg total) by mouth once daily.    CARVEDILOL (COREG) 3.125 MG TABLET    Take 1 tablet (3.125 mg total) by mouth 2 (two) times daily.    DUTASTERIDE (AVODART) 0.5 MG CAPSULE    Take 1 capsule (0.5 mg total) by mouth once daily.    FUROSEMIDE (LASIX) 40 MG TABLET    Take 1 tablet (40 mg total) by mouth once daily.    LISINOPRIL (PRINIVIL,ZESTRIL) 40 MG TABLET    Take 1 tablet (40 mg total) by mouth once daily.       Review of Systems   Constitutional: Negative for diaphoresis and fever.   HENT:  Negative for congestion and hearing loss.    Eyes:  Negative for blurred vision and pain.   Cardiovascular:  Positive for leg swelling. Negative for chest pain, claudication, dyspnea on exertion, near-syncope, palpitations and syncope.   Respiratory:  Negative for shortness of breath and sleep disturbances due to breathing.    Hematologic/Lymphatic: Negative for bleeding problem. Does not bruise/bleed easily.   Skin:  Negative for color change and poor wound healing.   Gastrointestinal:  Negative for abdominal pain and nausea.   Genitourinary:  Negative for bladder incontinence and flank pain.   Neurological:  Negative for focal weakness and light-headedness.      Objective:   BP (!) 156/86   Pulse (!) 55   Wt 112 kg (247 lb)   SpO2 98%   BMI 36.48 kg/m²    Physical Exam  Constitutional:       Appearance: He is well-developed. He is not diaphoretic.   HENT:      Head: Normocephalic and atraumatic.   Eyes:      General: No scleral icterus.     Pupils: Pupils are equal, round, and reactive to light.   Neck:      Vascular: No JVD.   Cardiovascular:      Rate and Rhythm: Regular rhythm. Bradycardia present.      Pulses: Intact distal pulses.           Radial pulses are 2+ on the right side and 2+ on the left side.        Dorsalis pedis pulses are 2+ on the right side and 2+ on the left side.        Posterior tibial pulses are 2+ on the right side and 2+ on the left side.      Heart sounds: S1 normal and S2 normal. No murmur  heard.    No friction rub. No gallop.   Pulmonary:      Effort: Pulmonary effort is normal. No respiratory distress.      Breath sounds: Normal breath sounds. No wheezing or rales.   Chest:      Chest wall: No tenderness.   Abdominal:      General: Bowel sounds are normal. There is no distension.      Palpations: Abdomen is soft. There is no mass.      Tenderness: There is no abdominal tenderness. There is no rebound.   Musculoskeletal:         General: No tenderness. Normal range of motion.      Cervical back: Normal range of motion and neck supple.      Right lower le+ Edema present.      Left lower le+ Edema present.   Skin:     General: Skin is warm and dry.      Coloration: Skin is not pale.   Neurological:      Mental Status: He is alert and oriented to person, place, and time.      Coordination: Coordination normal.      Deep Tendon Reflexes: Reflexes normal.   Psychiatric:         Behavior: Behavior normal.         Judgment: Judgment normal.         EKG 23- reviewed  A-Fib w T wave abnormality, HR 70  Cardiac echo 4/3/23-reviewed   Summary    The left ventricle is mildly enlarged with marked concentric hypertrophy and moderately decreased systolic function. The estimated ejection fraction is 30-35%.  There is an increased density to the thickened LV myocardium suggestive of an infiltrative process (e.g., amyloidosis)  Normal right ventricular size with normal right ventricular systolic function.  Severe biatrial enlargement.  Trivial pericardial effusion.  The pulmonary artery systolic pressure could not be estimated due to the lack of tricuspid insufficiency.  Normal central venous pressure (3 mmHg).  Atrial fibrillation observed    Assessment:       1. Chronic systolic congestive heart failure    2. Essential hypertension    3. Persistent atrial fibrillation    4. MYLES (acute kidney injury)    5. MEHNAZ (obstructive sleep apnea)    6. TIA (transient ischemic attack)    7. Kidney stone         Plan:          Chronic systolic congestive heart failure  Patient is identified as having Systolic (HFrEF) heart failure that is Chronic. CHF is currently controlled. Latest ECHO performed and demonstrates- Results for orders placed during the hospital encounter of 04/01/23    Echo    Interpretation Summary  · The left ventricle is mildly enlarged with marked concentric hypertrophy and moderately decreased systolic function. The estimated ejection fraction is 30-35%.  · There is an increased density to the thickened LV myocardium suggestive of an infiltrative process (e.g., amyloidosis)  · Normal right ventricular size with normal right ventricular systolic function.  · Severe biatrial enlargement.  · Trivial pericardial effusion.  · The pulmonary artery systolic pressure could not be estimated due to the lack of tricuspid insufficiency.  · Normal central venous pressure (3 mmHg).  · Atrial fibrillation observed.  . Continue Beta Blocker, ACE/ARB and Furosemide and monitor clinical status closely. Monitor on telemetry. Patient is on CHF pathway.  Monitor strict Is&Os and daily weights.  Place on fluid restriction of 2 L. Continue to stress to patient importance of self efficacy and  on diet for CHF. Last BNP reviewed- and noted below @LABRCNTIP(BNP,BNPTRIAGEBLO)@.    -continue carvedilol 3.125, furosemide 40 mg daily, lisinopril 40 mg   -repeat BMP to evaluate MYLES  -will start Entresto if renal function improved   -continue titrate medications to GDMT   -Discussed lifestyle modifications- diet, exercise, weight loss  -instructed to weigh self daily, notify office if > 3 pound weight gain in 1 day or 5 pounds in 1 week       Essential hypertension  -Goal BP < 130/80  -continue medication regimen: lisinopril 40 mg, furosemide 40 mg  -continue HF management  -discussed lifestyle modifications- diet, exercise, weight loss   -check BP twice daily and maintain log, bring log to all appts.     Atrial  fibrillation  -[id number]- 3  -continue AC therapy-apixaban 5 mg  -repeat EKG 4/14/23-reviewed A-Fib w HR 65     Obesity, Class II, BMI 35-39.9, with comorbidity  -encouraged weight loss  -discussed health risk associated w obesity     MYLES (acute kidney injury)  -repeat BMP to evaluate MYLES     MEHNAZ (obstructive sleep apnea)  -not currently on CPAP     TIA (transient ischemic attack)  No acute symptoms     Kidney stone  -Followed by Urology       Total duration of face to face visit time 30 minutes.  Total time spent counseling greater than fifty percent of total visit time.  Counseling included discussion regarding imaging findings, diagnosis, possibilities, treatment options, risks and benefits.  The patient had many questions regarding the options and long-term effects      Miki Salgado NP  Cardiology

## 2023-04-14 NOTE — ASSESSMENT & PLAN NOTE
-Goal BP < 130/80  -continue medication regimen: lisinopril 40 mg, furosemide 40 mg  -continue HF management  -discussed lifestyle modifications- diet, exercise, weight loss   -check BP twice daily and maintain log, bring log to all appts.

## 2023-04-14 NOTE — ASSESSMENT & PLAN NOTE
Patient is identified as having Systolic (HFrEF) heart failure that is Chronic. CHF is currently controlled. Latest ECHO performed and demonstrates- Results for orders placed during the hospital encounter of 04/01/23    Echo    Interpretation Summary  · The left ventricle is mildly enlarged with marked concentric hypertrophy and moderately decreased systolic function. The estimated ejection fraction is 30-35%.  · There is an increased density to the thickened LV myocardium suggestive of an infiltrative process (e.g., amyloidosis)  · Normal right ventricular size with normal right ventricular systolic function.  · Severe biatrial enlargement.  · Trivial pericardial effusion.  · The pulmonary artery systolic pressure could not be estimated due to the lack of tricuspid insufficiency.  · Normal central venous pressure (3 mmHg).  · Atrial fibrillation observed.  . Continue Beta Blocker, ACE/ARB and Furosemide and monitor clinical status closely. Monitor on telemetry. Patient is on CHF pathway.  Monitor strict Is&Os and daily weights.  Place on fluid restriction of 2 L. Continue to stress to patient importance of self efficacy and  on diet for CHF. Last BNP reviewed- and noted below @LABRCNTIP(BNP,BNPTRIAGEBLO)@.    -continue carvedilol 3.125, furosemide 40 mg daily, lisinopril 40 mg   -repeat BMP to evaluate MYLES  -will start Entresto if renal function improved   -continue titrate medications to GDMT   -Discussed lifestyle modifications- diet, exercise, weight loss  -instructed to weigh self daily, notify office if > 3 pound weight gain in 1 day or 5 pounds in 1 week

## 2023-04-17 ENCOUNTER — TELEPHONE (OUTPATIENT)
Dept: CARDIOLOGY | Facility: CLINIC | Age: 68
End: 2023-04-17

## 2023-04-17 ENCOUNTER — OFFICE VISIT (OUTPATIENT)
Dept: INTERNAL MEDICINE | Facility: CLINIC | Age: 68
End: 2023-04-17

## 2023-04-17 VITALS
SYSTOLIC BLOOD PRESSURE: 138 MMHG | BODY MASS INDEX: 35.73 KG/M2 | WEIGHT: 249.56 LBS | HEART RATE: 69 BPM | DIASTOLIC BLOOD PRESSURE: 84 MMHG | OXYGEN SATURATION: 98 % | HEIGHT: 70 IN

## 2023-04-17 DIAGNOSIS — N17.9 AKI (ACUTE KIDNEY INJURY): ICD-10-CM

## 2023-04-17 DIAGNOSIS — I10 ESSENTIAL HYPERTENSION: ICD-10-CM

## 2023-04-17 DIAGNOSIS — Z23 NEED FOR PNEUMOCOCCAL 20-VALENT CONJUGATE VACCINATION: ICD-10-CM

## 2023-04-17 DIAGNOSIS — N18.31 STAGE 3A CHRONIC KIDNEY DISEASE: Primary | ICD-10-CM

## 2023-04-17 DIAGNOSIS — N17.9 AKI (ACUTE KIDNEY INJURY): Primary | ICD-10-CM

## 2023-04-17 DIAGNOSIS — I50.9 HEART FAILURE, UNSPECIFIED HF CHRONICITY, UNSPECIFIED HEART FAILURE TYPE: ICD-10-CM

## 2023-04-17 DIAGNOSIS — G45.9 TIA (TRANSIENT ISCHEMIC ATTACK): ICD-10-CM

## 2023-04-17 DIAGNOSIS — I48.19 PERSISTENT ATRIAL FIBRILLATION: ICD-10-CM

## 2023-04-17 PROCEDURE — 99495 TCM SERVICES (MODERATE COMPLEXITY): ICD-10-PCS | Mod: S$PBB,,, | Performed by: PHYSICIAN ASSISTANT

## 2023-04-17 PROCEDURE — 99495 TRANSJ CARE MGMT MOD F2F 14D: CPT | Mod: S$PBB,,, | Performed by: PHYSICIAN ASSISTANT

## 2023-04-17 PROCEDURE — 90677 PCV20 VACCINE IM: CPT | Mod: PBBFAC

## 2023-04-17 PROCEDURE — 99999 PR PBB SHADOW E&M-EST. PATIENT-LVL V: ICD-10-PCS | Mod: PBBFAC,,, | Performed by: PHYSICIAN ASSISTANT

## 2023-04-17 PROCEDURE — 99215 OFFICE O/P EST HI 40 MIN: CPT | Mod: PBBFAC | Performed by: PHYSICIAN ASSISTANT

## 2023-04-17 PROCEDURE — 99999 PR PBB SHADOW E&M-EST. PATIENT-LVL V: CPT | Mod: PBBFAC,,, | Performed by: PHYSICIAN ASSISTANT

## 2023-04-17 NOTE — PROGRESS NOTES
Subjective:       Patient ID: Juvenal Bernal is a 67 y.o. male.        Chief Complaint: Hospital Follow Up    Transitional Care Note    Family and/or Caretaker present at visit?  Yes.  Diagnostic tests reviewed/disposition: No diagnosic tests pending after this hospitalization.  Disease/illness education: Yes  Home health/community services discussion/referrals: Patient does not have home health established from hospital visit.  They do not need home health.  If needed, we will set up home health for the patient.   Establishment or re-establishment of referral orders for community resources: Case management.   Discussion with other health care providers: No discussion with other health care providers necessary.     Juvenal Bernal is an established patient of [facility] here today for follow up visit.    Lives in [place].  Currently not driving.  Here today with his sister, Juhi, who helps coordinate care.  Retired from installing insulation.    Admitted for heart failure, then MYLES.      Incidental finding as below  4/5/2023 -- CT PE protocol -- 2.25 cm spiculated calcified lesion on left bladder trigone with bladder wall thickening, no renal masses, left renal simple cyst measuring approx 14 HU  Cystoscopy 4/12    Saw cardio 4/14 with plan to try to add entresto to regimen    Afib - eliquis 5 mg BID, coreg 3.125 mg BID    MEHNAZ not on cpap, will discuss more next visit, trying to get issues with insurance settled first    Heart failure - has upcoming appointment 5/5 for f/u, plan to add entresto, lisinopril 40 mg, lasix 40 mg, coreg 3.125 mg, weighing daily at home [phone], 2 L fluid restriction, SOB much better, no swelling    MYLES - repeat BMP from 3 days ago with improved creatinine but not back to baseline          Review of Systems   Constitutional:  Negative for appetite change, chills, fatigue and fever.   HENT:  Negative for congestion and sore throat.    Eyes:  Negative for visual disturbance.    Respiratory:  Negative for cough, chest tightness and shortness of breath.    Cardiovascular:  Negative for chest pain, palpitations and leg swelling.   Gastrointestinal:  Negative for abdominal pain, blood in stool, constipation, diarrhea, nausea and vomiting.   Genitourinary:  Negative for dysuria, frequency, hematuria and urgency.   Musculoskeletal:  Negative for arthralgias and back pain.   Skin:  Negative for rash.   Neurological:  Negative for dizziness, syncope, weakness and headaches.   Psychiatric/Behavioral:  Negative for dysphoric mood and sleep disturbance. The patient is not nervous/anxious.      Objective:      Physical Exam  Vitals and nursing note reviewed.   Constitutional:       Appearance: He is well-developed.   HENT:      Head: Normocephalic.      Right Ear: External ear normal.      Left Ear: External ear normal.   Eyes:      Pupils: Pupils are equal, round, and reactive to light.   Cardiovascular:      Rate and Rhythm: Normal rate and regular rhythm.      Heart sounds: Normal heart sounds. No murmur heard.    No friction rub. No gallop.   Pulmonary:      Effort: Pulmonary effort is normal. No respiratory distress.      Breath sounds: Normal breath sounds.   Abdominal:      Palpations: Abdomen is soft.      Tenderness: There is no abdominal tenderness.   Musculoskeletal:         General: No swelling.   Skin:     General: Skin is warm and dry.   Neurological:      General: No focal deficit present.      Mental Status: He is alert.   Psychiatric:         Mood and Affect: Mood normal.       Assessment:       1. Stage 3a chronic kidney disease    2. Heart failure, unspecified HF chronicity, unspecified heart failure type    3. Need for pneumococcal 20-valent conjugate vaccination    4. TIA (transient ischemic attack)    5. Essential hypertension    6. Persistent atrial fibrillation    7. MYLES (acute kidney injury)        Plan:       Juvenal was seen today for hospital follow up.    Diagnoses and  "all orders for this visit:    Stage 3a chronic kidney disease  -     Ambulatory referral/consult to Nephrology; Future  -     Ambulatory referral/consult to Outpatient Case Management    Heart failure, unspecified HF chronicity, unspecified heart failure type  -     Ambulatory referral/consult to Internal Medicine  -     Ambulatory referral/consult to Outpatient Case Management    Need for pneumococcal 20-valent conjugate vaccination  -     (In Office Administered) Pneumococcal Conjugate Vaccine (20 Valent) (IM)    TIA (transient ischemic attack)    Essential hypertension - stable and controlled, continue current regimen  BP Readings from Last 5 Encounters:   04/17/23 138/84   04/14/23 (!) 156/86   04/12/23 (!) 171/79   04/11/23 (!) 196/112   04/06/23 (!) 163/93      Persistent atrial fibrillation    MYLES (acute kidney injury) - repeat BMP per cardio, creatinine improved, consult to nephrology    Case management to assist  Prefers all appointments in [place] - establish PCP there    Pt has been given instructions populated from patient instructions database and has verbalized understanding of the after visit summary and information contained wherein.    Follow up with a primary care provider. May go to ER for acute shortness of breath, lightheadedness, fever, or any other emergent complaints or changes in condition.    "This note will be shared with the patient"    Future Appointments   Date Time Provider Department Center   5/5/2023 10:30 AM Miki Salgado NP [facility] CARDIO [place]   5/15/2023 10:00 AM Trisha C. Jiminez, NP [facility] PRICRE [place]                 "

## 2023-04-17 NOTE — TELEPHONE ENCOUNTER
----- Message from Miki Salgado NP sent at 4/17/2023 11:57 AM CDT -----  Please inform Mr. Bernal that his renal function has improved slightly. I will repeat lab work in 1 month.    Thank you, Miki Salgado NP

## 2023-04-18 DIAGNOSIS — N18.31 STAGE 3A CHRONIC KIDNEY DISEASE: Primary | ICD-10-CM

## 2023-05-05 ENCOUNTER — OFFICE VISIT (OUTPATIENT)
Dept: CARDIOLOGY | Facility: CLINIC | Age: 68
End: 2023-05-05

## 2023-05-05 VITALS
HEART RATE: 76 BPM | OXYGEN SATURATION: 96 % | BODY MASS INDEX: 35.36 KG/M2 | SYSTOLIC BLOOD PRESSURE: 144 MMHG | HEIGHT: 70 IN | DIASTOLIC BLOOD PRESSURE: 86 MMHG | WEIGHT: 247 LBS

## 2023-05-05 DIAGNOSIS — N17.9 AKI (ACUTE KIDNEY INJURY): ICD-10-CM

## 2023-05-05 DIAGNOSIS — G47.33 OSA (OBSTRUCTIVE SLEEP APNEA): ICD-10-CM

## 2023-05-05 DIAGNOSIS — N32.89 BLADDER MASS: ICD-10-CM

## 2023-05-05 DIAGNOSIS — I48.19 PERSISTENT ATRIAL FIBRILLATION: ICD-10-CM

## 2023-05-05 DIAGNOSIS — E66.01 SEVERE OBESITY (BMI 35.0-39.9) WITH COMORBIDITY: ICD-10-CM

## 2023-05-05 DIAGNOSIS — N20.0 KIDNEY STONE: ICD-10-CM

## 2023-05-05 DIAGNOSIS — I50.22 CHRONIC SYSTOLIC CONGESTIVE HEART FAILURE: Primary | ICD-10-CM

## 2023-05-05 DIAGNOSIS — I10 ESSENTIAL HYPERTENSION: ICD-10-CM

## 2023-05-05 PROCEDURE — 99214 PR OFFICE/OUTPT VISIT, EST, LEVL IV, 30-39 MIN: ICD-10-PCS | Mod: S$PBB,,,

## 2023-05-05 PROCEDURE — 99213 OFFICE O/P EST LOW 20 MIN: CPT | Mod: PBBFAC,PN

## 2023-05-05 PROCEDURE — 99999 PR PBB SHADOW E&M-EST. PATIENT-LVL III: ICD-10-PCS | Mod: PBBFAC,,,

## 2023-05-05 PROCEDURE — 99999 PR PBB SHADOW E&M-EST. PATIENT-LVL III: CPT | Mod: PBBFAC,,,

## 2023-05-05 PROCEDURE — 99214 OFFICE O/P EST MOD 30 MIN: CPT | Mod: S$PBB,,,

## 2023-05-05 RX ORDER — FUROSEMIDE 40 MG/1
40 TABLET ORAL 2 TIMES DAILY
Qty: 60 TABLET | Refills: 11 | Status: SHIPPED | OUTPATIENT
Start: 2023-05-05 | End: 2024-02-15 | Stop reason: SDUPTHER

## 2023-05-05 RX ORDER — CARVEDILOL 3.12 MG/1
3.12 TABLET ORAL 2 TIMES DAILY WITH MEALS
Qty: 60 TABLET | Refills: 11 | Status: SHIPPED | OUTPATIENT
Start: 2023-05-05 | End: 2023-05-24 | Stop reason: ALTCHOICE

## 2023-05-05 NOTE — PROGRESS NOTES
Subjective:    Patient ID:  Juvenal Bernal is a 67 y.o. male who presents for evaluation of No chief complaint on file.      PCP: [facility]     Referring Provider:     HPI: Patient is a 68 yo M w/H of HTN, A-fib, HFrEF ( LVEF 30-35%), MEHNAZ, obesity  who presents today to [facility] care and post hospital admission. Recent admission 4/5/23-4/6/23 w c/o blurred vision and speech difficulty 2/2 MYLES as a result of decreased hydration and diuretics for HF management. Symptoms improved with hydration. CT head negative, Carotid doppler showed no significant stenosis. Coreg for HF management decreased 2/2 bradycardia. Discharged home on medical therapy w Cardiology f/u. Incidental finding bladder mass on Ct scan and is currently following with Urology. He denies CP, SOB, palpitations, orthopnea, PND, pre-syncope, LOC, swelling, or claudication. He notes snoring. Patient noted medication compliance since discharge. He does not monitorn his home BP. Patient also notes dietary noncompliance with low salt diet. He walks daily.     Interim Hx: Patient presents today for f/u appt. He was last seen on 4/14/23 post admission and HF management and was continued on medical therapy. Entresto on hold 2/2 MYLES, improvement noted with repeat lads. Patient reports doing well since last visit. He denies CP, SOB, palpitations, orthopnea, PND, pre-syncope, LOC, swelling, or claudication. He notes snoring. Patient notes medication compliance without side effects. He does not monitorn his home BP. Patient also notes dietary noncompliance with low salt diet. He walks daily.  He reports slight improvement in GUERRERO.     Past Medical History:   Diagnosis Date    Anticoagulant long-term use     Arthritis     knee    Atrial fibrillation     Hypertension      Past Surgical History:   Procedure Laterality Date    TOTAL HIP ARTHROPLASTY Right 2013     Social History     Socioeconomic History    Marital status:    Tobacco Use    Smoking  status: Never    Smokeless tobacco: Never   Substance and Sexual Activity    Alcohol use: No     Alcohol/week: 0.0 standard drinks    Sexual activity: Yes     Partners: Female     No family history on file.    Review of patient's allergies indicates:  No Known Allergies    Medication List with Changes/Refills   New Medications    CARVEDILOL (COREG) 3.125 MG TABLET    Take 1 tablet (3.125 mg total) by mouth 2 (two) times daily with meals.   Current Medications    APIXABAN (ELIQUIS) 5 MG TAB    Take 1 tablet (5 mg total) by mouth 2 (two) times daily.    ASPIRIN (ECOTRIN) 81 MG EC TABLET    Take 1 tablet (81 mg total) by mouth once daily.    ATORVASTATIN (LIPITOR) 40 MG TABLET    Take 1 tablet (40 mg total) by mouth once daily.    DUTASTERIDE (AVODART) 0.5 MG CAPSULE    Take 1 capsule (0.5 mg total) by mouth once daily.    LISINOPRIL (PRINIVIL,ZESTRIL) 40 MG TABLET    Take 1 tablet (40 mg total) by mouth once daily.   Changed and/or Refilled Medications    Modified Medication Previous Medication    FUROSEMIDE (LASIX) 40 MG TABLET furosemide (LASIX) 40 MG tablet       Take 1 tablet (40 mg total) by mouth 2 (two) times daily.    Take 1 tablet (40 mg total) by mouth once daily.   Discontinued Medications    CARVEDILOL (COREG) 3.125 MG TABLET    Take 1 tablet (3.125 mg total) by mouth 2 (two) times daily.       Review of Systems   Constitutional: Negative for diaphoresis and fever.   HENT:  Negative for congestion and hearing loss.    Eyes:  Negative for blurred vision and pain.   Cardiovascular:  Positive for leg swelling. Negative for chest pain, claudication, dyspnea on exertion, near-syncope, palpitations and syncope.   Respiratory:  Negative for shortness of breath and sleep disturbances due to breathing.    Hematologic/Lymphatic: Negative for bleeding problem. Does not bruise/bleed easily.   Skin:  Negative for color change and poor wound healing.   Gastrointestinal:  Negative for abdominal pain and nausea.  "  Genitourinary:  Negative for bladder incontinence and flank pain.   Neurological:  Negative for focal weakness and light-headedness.      Objective:   BP (!) 144/86   Pulse 76   Ht 5' 10" (1.778 m)   Wt 112 kg (247 lb)   SpO2 96%   BMI 35.44 kg/m²    Physical Exam  Constitutional:       Appearance: He is well-developed. He is not diaphoretic.   HENT:      Head: Normocephalic and atraumatic.   Eyes:      General: No scleral icterus.     Pupils: Pupils are equal, round, and reactive to light.   Neck:      Vascular: No JVD.   Cardiovascular:      Rate and Rhythm: Regular rhythm. Bradycardia present.      Pulses: Intact distal pulses.           Radial pulses are 2+ on the right side and 2+ on the left side.        Dorsalis pedis pulses are 2+ on the right side and 2+ on the left side.        Posterior tibial pulses are 2+ on the right side and 2+ on the left side.      Heart sounds: S1 normal and S2 normal. No murmur heard.    No friction rub. No gallop.   Pulmonary:      Effort: Pulmonary effort is normal. No respiratory distress.      Breath sounds: Normal breath sounds. No wheezing or rales.   Chest:      Chest wall: No tenderness.   Abdominal:      General: Bowel sounds are normal. There is no distension.      Palpations: Abdomen is soft. There is no mass.      Tenderness: There is no abdominal tenderness. There is no rebound.   Musculoskeletal:         General: No tenderness. Normal range of motion.      Cervical back: Normal range of motion and neck supple.      Right lower le+ Edema present.      Left lower le+ Edema present.   Skin:     General: Skin is warm and dry.      Coloration: Skin is not pale.   Neurological:      Mental Status: He is alert and oriented to person, place, and time.      Coordination: Coordination normal.      Deep Tendon Reflexes: Reflexes normal.   Psychiatric:         Behavior: Behavior normal.         Judgment: Judgment normal.         EKG 23- reviewed  Raman CISNEROS " wave abnormality, HR 70    Cardiac echo 4/3/23-reviewed   Summary    The left ventricle is mildly enlarged with marked concentric hypertrophy and moderately decreased systolic function. The estimated ejection fraction is 30-35%.  There is an increased density to the thickened LV myocardium suggestive of an infiltrative process (e.g., amyloidosis)  Normal right ventricular size with normal right ventricular systolic function.  Severe biatrial enlargement.  Trivial pericardial effusion.  The pulmonary artery systolic pressure could not be estimated due to the lack of tricuspid insufficiency.  Normal central venous pressure (3 mmHg).  Atrial fibrillation observed    Assessment:       1. Chronic systolic congestive heart failure    2. Essential hypertension    3. MYLES (acute kidney injury)    4. MEHNAZ (obstructive sleep apnea)    5. Kidney stone    6. Bladder mass    7. Severe obesity (BMI 35.0-39.9) with comorbidity           Plan:         Chronic systolic congestive heart failure  Patient is identified as having Systolic (HFrEF) heart failure that is Chronic. CHF is currently controlled. Latest ECHO performed and demonstrates- Results for orders placed during the hospital encounter of 04/01/23    Echo    Interpretation Summary  · The left ventricle is mildly enlarged with marked concentric hypertrophy and moderately decreased systolic function. The estimated ejection fraction is 30-35%.  · There is an increased density to the thickened LV myocardium suggestive of an infiltrative process (e.g., amyloidosis)  · Normal right ventricular size with normal right ventricular systolic function.  · Severe biatrial enlargement.  · Trivial pericardial effusion.  · The pulmonary artery systolic pressure could not be estimated due to the lack of tricuspid insufficiency.  · Normal central venous pressure (3 mmHg).  · Atrial fibrillation observed.  . Continue Beta Blocker, ACE/ARB and Furosemide and monitor clinical status closely.  Monitor on telemetry. Patient is on CHF pathway.  Monitor strict Is&Os and daily weights.  Place on fluid restriction of 2 L. Continue to stress to patient importance of self efficacy and  on diet for CHF. Last BNP reviewed- and noted below @LABRCNTIP(BNP,BNPTRIAGEBLO)@.    -continue carvedilol 3.125 , lisinopril 40 mg  - increase  furosemide 40 mg  BID   -repeat BMP to evaluate MYLES  -will start Entresto if renal function improved   -continue titrate medications to GDMT   -Discussed lifestyle modifications- diet, exercise, weight loss  -instructed to weigh self daily, notify office if > 3 pound weight gain in 1 day or 5 pounds in 1 week     Essential hypertension  -Goal BP < 130/80  -continue medication regimen: lisinopril 40 mg, carvedilol 3.125 mg   - increase furosemide to 40 mg BID   -continue HF management  -discussed lifestyle modifications- diet, exercise, weight loss   -check BP twice daily and maintain log, bring log to all appts.     MYLES (acute kidney injury)  -repeat BMP to evaluate MYLES   -creatinine improved 1.48, 52.2 4/14/23    MEHNAZ (obstructive sleep apnea)  -not currently on CPAP     Obesity, Class II, BMI 35-39.9, with comorbidity  -encouraged weight loss  -discussed health risk associated w obesity     Kidney stone  -Followed by Urology     Bladder mass  -Followed by Urology     Atrial fibrillation  -[id number]  -continue AC therapy-apixaban 5 mg  -repeat EKG 4/14/23-reviewed A-Fib w HR 65         Total duration of face to face visit time 30 minutes.  Total time spent counseling greater than fifty percent of total visit time.  Counseling included discussion regarding imaging findings, diagnosis, possibilities, treatment options, risks and benefits.  The patient had many questions regarding the options and long-term effects      Miki Salgado,ABDULAZIZ  Cardiology

## 2023-05-05 NOTE — ASSESSMENT & PLAN NOTE
Patient is identified as having Systolic (HFrEF) heart failure that is Chronic. CHF is currently controlled. Latest ECHO performed and demonstrates- Results for orders placed during the hospital encounter of 04/01/23    Echo    Interpretation Summary  · The left ventricle is mildly enlarged with marked concentric hypertrophy and moderately decreased systolic function. The estimated ejection fraction is 30-35%.  · There is an increased density to the thickened LV myocardium suggestive of an infiltrative process (e.g., amyloidosis)  · Normal right ventricular size with normal right ventricular systolic function.  · Severe biatrial enlargement.  · Trivial pericardial effusion.  · The pulmonary artery systolic pressure could not be estimated due to the lack of tricuspid insufficiency.  · Normal central venous pressure (3 mmHg).  · Atrial fibrillation observed.  . Continue Beta Blocker, ACE/ARB and Furosemide and monitor clinical status closely. Monitor on telemetry. Patient is on CHF pathway.  Monitor strict Is&Os and daily weights.  Place on fluid restriction of 2 L. Continue to stress to patient importance of self efficacy and  on diet for CHF. Last BNP reviewed- and noted below @LABRCNTIP(BNP,BNPTRIAGEBLO)@.    -continue carvedilol 3.125 , lisinopril 40 mg  - increase  furosemide 40 mg  BID   -repeat BMP to evaluate MYLES  -will start Entresto if renal function improved   -continue titrate medications to GDMT   -Discussed lifestyle modifications- diet, exercise, weight loss  -instructed to weigh self daily, notify office if > 3 pound weight gain in 1 day or 5 pounds in 1 week

## 2023-05-05 NOTE — ASSESSMENT & PLAN NOTE
-Goal BP < 130/80  -continue medication regimen: lisinopril 40 mg, carvedilol 3.125 mg   - increase furosemide to 40 mg BID   -continue HF management  -discussed lifestyle modifications- diet, exercise, weight loss   -check BP twice daily and maintain log, bring log to all appts.

## 2023-05-09 ENCOUNTER — PATIENT MESSAGE (OUTPATIENT)
Dept: CARDIOLOGY | Facility: CLINIC | Age: 68
End: 2023-05-09

## 2023-05-09 NOTE — TELEPHONE ENCOUNTER
Called pt regarding message, spoke with pt's sister who informed me that the pt has been unable to get their eliquis for a cheeper price and would like to inquire about using a different medication. Pt's sister states they are in need of a refill now and are not able to  afford it     Informed pt   I will call them back later today or tomorrow to get back with them after speaking to the provider

## 2023-05-10 NOTE — TELEPHONE ENCOUNTER
Please inform,  Mr. Bernal that he can  a co-pay card from the office for cost assistance for at least 1 month while he tries to obtain additional coverage. If he is unable to obtain coverage we may have to switch to coumadin, but this requires frequent lab studies.     Thanks,   Miki Salgado,NP

## 2023-05-11 ENCOUNTER — PATIENT MESSAGE (OUTPATIENT)
Dept: CARDIOLOGY | Facility: CLINIC | Age: 68
End: 2023-05-11

## 2023-05-11 ENCOUNTER — TELEPHONE (OUTPATIENT)
Dept: CARDIOLOGY | Facility: CLINIC | Age: 68
End: 2023-05-11

## 2023-05-11 NOTE — TELEPHONE ENCOUNTER
Please inform Mr. Bernal I have spoken with Dr. Carrasco and we have a window of approximately 30 days and if unable to obtain his medication then we will place on Coumadin and refer to coumadin clinic for management and lab studies needed to adjust medication dosages.     Thank you,   Miki SalgadoNP

## 2023-05-12 ENCOUNTER — PATIENT MESSAGE (OUTPATIENT)
Dept: CARDIOLOGY | Facility: CLINIC | Age: 68
End: 2023-05-12

## 2023-05-12 NOTE — TELEPHONE ENCOUNTER
Called patient to inform them that we have completed the provider section of the [employer] Eliquis Assistance application that was dropped off by the patient yesterday.   Asked patients sister if she had the rest of the application (only the provider portion was dropped off) and informed her that if they dropped off the complete filled out packet, I could have it faxed over to the program   Pt sister stated she does not want to drop it off, and will have the document scanned and sent over via [employer].     Informed pt's sister I will let them know when I receive it

## 2023-05-15 ENCOUNTER — PATIENT MESSAGE (OUTPATIENT)
Dept: CARDIOLOGY | Facility: CLINIC | Age: 68
End: 2023-05-15

## 2023-05-15 ENCOUNTER — TELEPHONE (OUTPATIENT)
Dept: CARDIOLOGY | Facility: CLINIC | Age: 68
End: 2023-05-15

## 2023-05-19 ENCOUNTER — ANTI-COAG VISIT (OUTPATIENT)
Dept: CARDIOLOGY | Facility: CLINIC | Age: 68
End: 2023-05-19

## 2023-05-19 DIAGNOSIS — I48.19 PERSISTENT ATRIAL FIBRILLATION: Primary | ICD-10-CM

## 2023-05-22 ENCOUNTER — TELEPHONE (OUTPATIENT)
Dept: CARDIOLOGY | Facility: CLINIC | Age: 68
End: 2023-05-22

## 2023-05-22 ENCOUNTER — TELEPHONE (OUTPATIENT)
Dept: UROLOGY | Facility: CLINIC | Age: 68
End: 2023-05-22

## 2023-05-22 NOTE — PROGRESS NOTES
Patient continuing apixaban at this time. No plan to start warfarin. Will discharge from the coumadin clinic. NP Miki Salgado aware to send new referral if planning to start warfarin in the future.

## 2023-05-22 NOTE — TELEPHONE ENCOUNTER
----- Message from Miki Salgado NP sent at 5/22/2023  1:25 PM CDT -----  Regarding: RE: [employer]  Please contact patient in regards to obtaining medication this month. Also verify Urology procedure scheduled date.     Thank you,  Miki Salgado NP  ----- Message -----  From: Catherine Blount MA  Sent: 5/18/2023   4:02 PM CDT  To: Miki Salgado NP  Subject: [employer]                             Received notice that pt's household income is over the current eligibility for [employer] assistance program for his Eliquis, thanks

## 2023-05-22 NOTE — TELEPHONE ENCOUNTER
I sent this message to Dr. Soliz now & I mentioned it to him in clinic today.  Thank you.    ----- Message from Shantell Benites LPN sent at 5/22/2023  4:13 PM CDT -----    ----- Message -----  From: Tamiko David  Sent: 5/22/2023   2:57 PM CDT  To: [facility] Staff    Who Called: Patient    What is the request in detail: Requesting call back to discuss scheduling his procedure. Patient has medication blood thinning concerns. Patient's cardiologist has informed patient that if the procedure needs to be scheduled it's best to do it now before the cardiologist places patient on coumadin. Please advise.     Can the clinic reply by [facility]? No    Best Call Back Number: [phone]      Additional Information:     Symptom Screen outcome:

## 2023-05-23 ENCOUNTER — OFFICE VISIT (OUTPATIENT)
Dept: NEPHROLOGY | Facility: CLINIC | Age: 68
End: 2023-05-23
Payer: MEDICARE

## 2023-05-23 ENCOUNTER — TELEPHONE (OUTPATIENT)
Dept: UROLOGY | Facility: CLINIC | Age: 68
End: 2023-05-23

## 2023-05-23 VITALS
DIASTOLIC BLOOD PRESSURE: 101 MMHG | SYSTOLIC BLOOD PRESSURE: 150 MMHG | BODY MASS INDEX: 34.8 KG/M2 | HEART RATE: 63 BPM | OXYGEN SATURATION: 96 % | WEIGHT: 242.5 LBS

## 2023-05-23 DIAGNOSIS — N20.0 NEPHROLITHIASIS: Primary | ICD-10-CM

## 2023-05-23 DIAGNOSIS — N18.30 BENIGN HYPERTENSION WITH CKD (CHRONIC KIDNEY DISEASE) STAGE III: ICD-10-CM

## 2023-05-23 DIAGNOSIS — N18.31 STAGE 3A CHRONIC KIDNEY DISEASE: ICD-10-CM

## 2023-05-23 DIAGNOSIS — I12.9 BENIGN HYPERTENSION WITH CKD (CHRONIC KIDNEY DISEASE) STAGE III: ICD-10-CM

## 2023-05-23 DIAGNOSIS — N21.0 BLADDER STONES: Primary | ICD-10-CM

## 2023-05-23 PROCEDURE — 99205 OFFICE O/P NEW HI 60 MIN: CPT | Mod: S$PBB,,, | Performed by: NURSE PRACTITIONER

## 2023-05-23 PROCEDURE — 99999 PR PBB SHADOW E&M-EST. PATIENT-LVL III: CPT | Mod: PBBFAC,,, | Performed by: NURSE PRACTITIONER

## 2023-05-23 PROCEDURE — 99999 PR PBB SHADOW E&M-EST. PATIENT-LVL III: ICD-10-PCS | Mod: PBBFAC,,, | Performed by: NURSE PRACTITIONER

## 2023-05-23 PROCEDURE — 99205 PR OFFICE/OUTPT VISIT, NEW, LEVL V, 60-74 MIN: ICD-10-PCS | Mod: S$PBB,,, | Performed by: NURSE PRACTITIONER

## 2023-05-23 PROCEDURE — 99213 OFFICE O/P EST LOW 20 MIN: CPT | Mod: PBBFAC | Performed by: NURSE PRACTITIONER

## 2023-05-23 NOTE — PROGRESS NOTES
Subjective:       Patient ID: Juvenal Bernal is a 67 y.o. black or  [place]osal[place] male who presents for new evaluation of CKD 3a.    HPI     Juvenal Bernal is a 67 year old male with HTN, nephroliothiasis, ED, Afib/ A flutter, MEHNAZ, h/o TIA that presents for new evaluation of CKD 3a. Previously followed by Dr. Jama with urology for calcium kidney stone s/p lithotripsy. Recently seen by Dr. Soliz with urology with cystoscopy on 4/11/23 showing large left renal stone. Reported plans for repeat lithotripsy. He reports longstanding HTN. Cardiology following antihypertensives with recent increase of lasix to 40mg BID. He is s/p admission for TIA and MYLES in 04/2023. Received contrast with CT done on 4/1/23. Peak sCr at this time 2.1 on 4/5/23 from baseline of ~1.4. He drinks about 1L of water per day. He reports his sister has kidney disease related to diabetes. The patient denies prior nephrologist, taking NSAIDs, herbal supplements, or new antibiotics, recreational drugs, recent episode of dehydration, diarrhea, nausea or vomiting, acute illness.     Significant family hx includes: sister- CKD 2/2 DM    Review of Systems   Respiratory:  Positive for shortness of breath (with exertion).    Cardiovascular:  Negative for chest pain and leg swelling.   Gastrointestinal:  Negative for diarrhea, nausea and vomiting.   Genitourinary:  Negative for difficulty urinating, dysuria and hematuria.   Neurological:  Negative for syncope.   All other systems reviewed and are negative.    Objective:       Blood pressure (!) 150/101, pulse 63, weight 110 kg (242 lb 8.1 oz), SpO2 96 %.  Physical Exam  Vitals reviewed.   Constitutional:       General: He is not in acute distress.  HENT:      Head: Normocephalic and atraumatic.   Eyes:      General: No scleral icterus.  Cardiovascular:      Rate and Rhythm: Normal rate. Rhythm irregular.   Pulmonary:      Effort: Pulmonary effort is normal. No respiratory distress.      Breath sounds: No  wheezing or rales.   Musculoskeletal:      Right lower leg: No edema.      Left lower leg: No edema.   Skin:     General: Skin is warm and dry.   Neurological:      Mental Status: He is alert.      Comments: Alert, speech clear, answers questions appropriately    Psychiatric:         Mood and Affect: Mood normal.         Behavior: Behavior normal.         Lab Results   Component Value Date    CREATININE 1.43 (H) 05/16/2023    CREATININE 1.43 (H) 05/16/2023     Prot/Creat Ratio, Urine   Date Value Ref Range Status   05/16/2023 0.05 0.00 - 0.20 Final   04/01/2023 0.34 (H) 0.00 - 0.20 Final     Lab Results   Component Value Date     05/16/2023     05/16/2023    K 4.2 05/16/2023    K 4.2 05/16/2023    CO2 31 (H) 05/16/2023    CO2 31 (H) 05/16/2023     05/16/2023     05/16/2023     Lab Results   Component Value Date    CALCIUM 9.5 05/16/2023    CALCIUM 9.5 05/16/2023    PHOS 3.1 05/16/2023     Lab Results   Component Value Date    HGB 16.3 05/16/2023    WBC 8.14 05/16/2023    HCT 51.9 05/16/2023      Lab Results   Component Value Date    HGBA1C 5.7 (H) 04/03/2023     05/16/2023    BUN 15 05/16/2023    BUN 15 05/16/2023     Lab Results   Component Value Date    LDLCALC 124.4 04/02/2023     CT Abdomen 4/1/23  FINDINGS:  Pulmonary arterial system: This is a good quality examination for the evaluation of pulmonary thromboembolism with good opacification of the pulmonary arteries to the level of the segmental branches of the pulmonary arterial system. There is no evidence of acute pulmonary thromboembolism. No large saddle pulmonary embolism, enlargement of the main pulmonary artery, or secondary findings of right ventricular strain.  Aorta and heart: The heart is enlarged in size.  Multi chamber with calcifications in the mitral valve annulus.  Is no pericardial fluid.  No thoracic aortic aneurysm.  No thoracic aortic dissection.  Airways: Unremarkable.  Lymph nodes: No pathologically enlarged  lymph nodes in the chest or axilla.  Lungs: Scattered ground-glass opacities with no nodular consistency.  No lobar consolidation.  No dominant nodule.  Otherwise, lungs are clear with no evidence of airspace consolidation, mass, or bronchiectasis.  No emphysematous lung architecture.  Pleura: No significant volume of pleural fluid or pneumothorax.  No pleural based masses.  Abdomen and pelvis: The liver, spleen, pancreas, adrenal glands and kidneys appear unremarkable, allowing for bilateral low-density lesions in the kidneys compatible with cortical cysts.  The abdominal aorta and vena cava are normal in caliber with minimal atherosclerotic disease in the aorta and iliac system.  The loops of large and small intestines appear unremarkable.  There is no free air or inflammation.  The urinary bladder contains a 2.25 cm spiculated calcified K shin/calcified lesion in the left side of the trigone with thickening along the posterior trigone.  The prostate gland is mildly enlarged with median lobe prominence but no invagination into the urinary bladder.  No pelvic mass or free fluid is evident.  Degenerative changes with sclerotic bone island again noted in the iliac wing left.  Lumbar stenosis at multiple levels due to a short pedicles and hypertrophic posterior element changes.  Total hip replacement on the right.     Impression:     1. No evidence of acute pulmonary thromboembolism.  2. Scattered ground-glass opacities in the lungs.  Correlate for early pulmonary edema or pneumonitis.  3. No lobar consolidation or effusion.  4. Cardiac chamber enlargement.  5. Calcified spiculated lesion in the urinary bladder toward the left with thickened trigone.  While this could represent a bladder calculus, calcified bladder mass with bladder wall thickening is difficult to exclude.  Urology follow-up and interrogation is urged if not already performed.  6.  This report was flagged in Epic as abnormal.      Assessment:       1.  Nephrolithiasis    2. Stage 3a chronic kidney disease    3. Benign hypertension with CKD (chronic kidney disease) stage III        Plan:   CKD stage 3a c eGFR 54.4 mL/min -  - Baseline Cr 1.4, CKD clinically related to longstanding HTN   - Following with Urology for renal stones, management as below  - Avoid NSAIDs, increase hydration, recommend further BP control     UPCR 50mg; continue RAASi   Acid-base WNL   Renal osteodystrophy Ca and phos stable, monitor PTH   Anemia WNL   DM No history   Lipid Management On statin   ESRD planning Provided education about the stages of CKD, usual progression, and need to monitor for and treat CKD-related disease in an effort to delay progression of CKD.    Stable CKD II-IIIa,      HTN   - Above goal  - Continue lisinopril 40mg qd, lasix 40mg BID, coreg 3.125 mg BID  - Management per cardiology, recommend further BP control  - low Na diet     Nephrolithiasis  - Following with Urology with plans for lithotripsy  - Plan for 24 hr urine supersaturation in the future  - Increase H2O intake for UOP >2L per day, low Na diet, reduce animal protein  - Potential for dietician referral for kidney stones in future as well, defer today      All questions patient had were answered.  Asked if further questions. None. F/u in clinic 3 months  with labs and urine prior to next visit or sooner if needed.  ER for emergency concerns.    Summary of Plan:  - Increase water intake, dietary changes as above   - Continue with OR per Urology  - F/u with standard labs and urine, plan for 24 urine supersaturation in future unless stone analysis can be performed     74 minutes of total time spent on the encounter, which includes face to face time and non-face to face time preparing to see the patient (eg, review of tests), Obtaining and/or reviewing separately obtained history, documenting clinical information in the electronic or other health record, independently interpreting results (not separately  reported) and communicating results to the patient/family/caregiver, or Care coordination (not separately reported).

## 2023-05-24 ENCOUNTER — OFFICE VISIT (OUTPATIENT)
Dept: CARDIOLOGY | Facility: CLINIC | Age: 68
End: 2023-05-24
Payer: COMMERCIAL

## 2023-05-24 VITALS
BODY MASS INDEX: 35.44 KG/M2 | DIASTOLIC BLOOD PRESSURE: 84 MMHG | SYSTOLIC BLOOD PRESSURE: 142 MMHG | WEIGHT: 247 LBS | OXYGEN SATURATION: 100 % | HEART RATE: 70 BPM

## 2023-05-24 DIAGNOSIS — I10 ESSENTIAL HYPERTENSION: ICD-10-CM

## 2023-05-24 DIAGNOSIS — N18.31 STAGE 3A CHRONIC KIDNEY DISEASE: ICD-10-CM

## 2023-05-24 DIAGNOSIS — G47.33 OSA (OBSTRUCTIVE SLEEP APNEA): ICD-10-CM

## 2023-05-24 DIAGNOSIS — N32.89 BLADDER MASS: ICD-10-CM

## 2023-05-24 DIAGNOSIS — I48.19 PERSISTENT ATRIAL FIBRILLATION: ICD-10-CM

## 2023-05-24 DIAGNOSIS — N20.0 KIDNEY STONE: ICD-10-CM

## 2023-05-24 DIAGNOSIS — Z01.810 PRE-OPERATIVE CARDIOVASCULAR EXAMINATION: ICD-10-CM

## 2023-05-24 DIAGNOSIS — I50.22 CHRONIC SYSTOLIC CONGESTIVE HEART FAILURE: Primary | ICD-10-CM

## 2023-05-24 PROCEDURE — 99999 PR PBB SHADOW E&M-EST. PATIENT-LVL III: CPT | Mod: PBBFAC,,,

## 2023-05-24 PROCEDURE — 99213 OFFICE O/P EST LOW 20 MIN: CPT | Mod: PBBFAC,PN

## 2023-05-24 PROCEDURE — 99214 OFFICE O/P EST MOD 30 MIN: CPT | Mod: S$GLB,,,

## 2023-05-24 PROCEDURE — 99999 PR PBB SHADOW E&M-EST. PATIENT-LVL III: ICD-10-PCS | Mod: PBBFAC,,,

## 2023-05-24 PROCEDURE — 99214 PR OFFICE/OUTPT VISIT, EST, LEVL IV, 30-39 MIN: ICD-10-PCS | Mod: S$GLB,,,

## 2023-05-24 RX ORDER — SPIRONOLACTONE 25 MG/1
12.5 TABLET ORAL DAILY
Qty: 45 TABLET | Refills: 3 | Status: SHIPPED | OUTPATIENT
Start: 2023-05-24 | End: 2024-05-23

## 2023-05-24 RX ORDER — SACUBITRIL AND VALSARTAN 24; 26 MG/1; MG/1
1 TABLET, FILM COATED ORAL 2 TIMES DAILY
Qty: 60 TABLET | Refills: 6 | Status: SHIPPED | OUTPATIENT
Start: 2023-05-24 | End: 2023-05-29

## 2023-05-24 RX ORDER — CARVEDILOL 6.25 MG/1
6.25 TABLET ORAL 2 TIMES DAILY WITH MEALS
Qty: 180 TABLET | Refills: 3 | Status: SHIPPED | OUTPATIENT
Start: 2023-05-24 | End: 2023-05-29

## 2023-05-24 NOTE — ASSESSMENT & PLAN NOTE
-Followed by Urology    My Asthma Action Plan  Name: Laura Santiago   YOB: 1997  Date: 6/13/2018   My doctor: KATI Lizama   My clinic: [facility] [place]        My Control Medicine: { :927660}  My Rescue Medicine: { :154751}  {AAP include Oral Steroid:850605} My Asthma Severity: { :428245}  Avoid your asthma triggers: { :576935}  exercise or sports     {Is patient a child or adult?:478425}       GREEN ZONE   Good Control    I feel good    No cough or wheeze    Can work, sleep and play without asthma symptoms       Take your asthma control medicine every day.     1. If exercise triggers your asthma, take your rescue medication    15 minutes before exercise or sports, and    During exercise if you have asthma symptoms  2. Spacer to use with inhaler: If you have a spacer, make sure to use it with your inhaler             YELLOW ZONE Getting Worse  I have ANY of these:    I do not feel good    Cough or wheeze    Chest feels tight    Wake up at night   1. Keep taking your Green Zone medications  2. Start taking your rescue medicine:    every 20 minutes for up to 1 hour. Then every 4 hours for 24-48 hours.  3. If you stay in the Yellow Zone for more than 12-24 hours, contact your doctor.  4. If you do not return to the Green Zone in 12-24 hours or you get worse, start taking your oral steroid medicine if prescribed by your provider.           RED ZONE Medical Alert - Get Help  I have ANY of these:    I feel awful    Medicine is not helping    Breathing getting harder    Trouble walking or talking    Nose opens wide to breathe       1. Take your rescue medicine NOW  2. If your provider has prescribed an oral steroid medicine, start taking it NOW  3. Call your doctor NOW  4. If you are still in the Red Zone after 20 minutes and you have not reached your doctor:    Take your rescue medicine again and    Call 911 or go to the emergency room right away    See your regular doctor within 2 weeks of an Emergency Room  or Urgent Care visit for follow-up treatment.          Annual Reminders:  Meet with Asthma Educator,  Flu Shot in the Fall, consider Pneumonia Vaccination for patients with asthma (aged 19 and older).    Pharmacy:    [facility] - [place], [place] - [address]  [id number] [place], [place] - [address] AT [facility] OF  [address]                      Asthma Triggers  How To Control Things That Make Your Asthma Worse    Triggers are things that make your asthma worse.  Look at the list below to help you find your triggers and what you can do about them.  You can help prevent asthma flare-ups by staying away from your triggers.      Trigger                                                          What you can do   Cigarette Smoke  Tobacco smoke can make asthma worse. Do not allow smoking in your home, car or around you.  Be sure no one smokes at a child s day care or school.  If you smoke, ask your health care provider for ways to help you quit.  Ask family members to quit too.  Ask your health care provider for a referral to Quit Plan to help you quit smoking, or call 8-138-890-PLAN.     Colds, Flu, Bronchitis  These are common triggers of asthma. Wash your hands often.  Don t touch your eyes, nose or mouth.  Get a flu shot every year.     Dust Mites  These are tiny bugs that live in cloth or carpet. They are too small to see. Wash sheets and blankets in hot water every week.   Encase pillows and mattress in dust mite proof covers.  Avoid having carpet if you can. If you have carpet, vacuum weekly.   Use a dust mask and HEPA vacuum.   Pollen and Outdoor Mold  Some people are allergic to trees, grass, or weed pollen, or molds. Try to keep your windows closed.  Limit time out doors when pollen count is high.   Ask you health care provider about taking medicine during allergy season.     Animal Dander  Some people are allergic to skin flakes, urine or saliva from pets with fur or feathers. Keep  pets with fur or feathers out of your home.    If you can t keep the pet outdoors, then keep the pet out of your bedroom.  Keep the bedroom door closed.  Keep pets off cloth furniture and away from stuffed toys.     Mice, Rats, and Cockroaches  Some people are allergic to the waste from these pests.   Cover food and garbage.  Clean up spills and food crumbs.  Store grease in the refrigerator.   Keep food out of the bedroom.   Indoor Mold  This can be a trigger if your home has high moisture. Fix leaking faucets, pipes, or other sources of water.   Clean moldy surfaces.  Dehumidify basement if it is damp and smelly.   Smoke, Strong Odors, and Sprays  These can reduce air quality. Stay away from strong odors and sprays, such as perfume, powder, hair spray, paints, smoke incense, paint, cleaning products, candles and new carpet.   Exercise or Sports  Some people with asthma have this trigger. Be active!  Ask your doctor about taking medicine before sports or exercise to prevent symptoms.    Warm up for 5-10 minutes before and after sports or exercise.     Other Triggers of Asthma  Cold air:  Cover your nose and mouth with a scarf.  Sometimes laughing or crying can be a trigger.  Some medicines and food can trigger asthma.

## 2023-05-24 NOTE — ASSESSMENT & PLAN NOTE
-Baseline Cr 1.4, CKD clinically related to longstanding HTN   creatinine 1.43 5/16/23   - Following with Urology for renal stones  - Avoid NSAIDs, increase hydration, recommend further BP control

## 2023-05-24 NOTE — PROGRESS NOTES
Subjective:    Patient ID:  Juvenal Bernal is a 67 y.o. male who presents for evaluation of Follow-up      PCP: Primary Doctor No     Referring Provider:     HPI: Patient is a 68 yo M w/H of HTN, A-fib, HFrEF ( LVEF 30-35%), MEHNAZ, obesity  who presents today to [facility] care and post hospital admission. Recent admission 4/5/23-4/6/23 w c/o blurred vision and speech difficulty 2/2 MYLES as a result of decreased hydration and diuretics for HF management. Symptoms improved with hydration. CT head negative, Carotid doppler showed no significant stenosis. Coreg for HF management decreased 2/2 bradycardia. Discharged home on medical therapy w Cardiology f/u. Incidental finding bladder mass on Ct scan and is currently following with Urology. He denies CP, SOB, palpitations, orthopnea, PND, pre-syncope, LOC, swelling, or claudication. He notes snoring. Patient noted medication compliance since discharge. He does not monitorn his home BP. Patient also notes dietary noncompliance with low salt diet. He walks daily.     5/5/23: Patient presents today for f/u appt. He was last seen on 4/14/23 post admission and HF management and was continued on medical therapy. Entresto on hold 2/2 MYLES, improvement noted with repeat lads. Patient reports doing well since last visit. He denies CP, SOB, palpitations, orthopnea, PND, pre-syncope, LOC, swelling, or claudication. He notes snoring. Patient notes medication compliance without side effects. He does not monitorn his home BP. Patient also notes dietary noncompliance with low salt diet. He walks daily.  He reports slight improvement in GUERRERO.     5/24/23: Patient presents today for f/u appt. He was last seen on 5/5/23 for HF management and was continued on medical therapy. He has been out of his apixaban and unable to obtain due to cost. Multiple attempts to obtain financial assistance including "[employer] assistance program , of which he did not qualify  2/2 household income.  Discussed with patient's sister regarding changing to coumadin. Also patient's Urologist requested AC hold for scheduled lithotripsy on 6/2/23. Patient notes slight improvement in GUERRERO, he can now walk a little further Patient denies CP, SOB, palpitations, orthopnea, PND, pre-syncope, LOC, swelling, or claudication. He notes snoring. Patient notes medication compliance without side effects. He does not monitor his home BP. Patient also notes dietary noncompliance with low salt diet. He walks daily.  He reports slight improvement in GUERRERO.    Past Medical History:   Diagnosis Date    Anticoagulant long-term use     Arthritis     knee    Atrial fibrillation     Hypertension      Past Surgical History:   Procedure Laterality Date    TOTAL HIP ARTHROPLASTY Right 2013     Social History     Socioeconomic History    Marital status:    Tobacco Use    Smoking status: Never    Smokeless tobacco: Never   Substance and Sexual Activity    Alcohol use: No     Alcohol/week: 0.0 standard drinks    Sexual activity: Yes     Partners: Female     No family history on file.    Review of patient's allergies indicates:  No Known Allergies    Medication List with Changes/Refills   Current Medications    APIXABAN (ELIQUIS) 5 MG TAB    Take 1 tablet (5 mg total) by mouth 2 (two) times daily.    ASPIRIN (ECOTRIN) 81 MG EC TABLET    Take 1 tablet (81 mg total) by mouth once daily.    ATORVASTATIN (LIPITOR) 40 MG TABLET    Take 1 tablet (40 mg total) by mouth once daily.    DUTASTERIDE (AVODART) 0.5 MG CAPSULE    Take 1 capsule (0.5 mg total) by mouth once daily.    FUROSEMIDE (LASIX) 40 MG TABLET    Take 1 tablet (40 mg total) by mouth 2 (two) times daily.    LISINOPRIL (PRINIVIL,ZESTRIL) 40 MG TABLET    Take 1 tablet (40 mg total) by mouth once daily.   Discontinued Medications    CARVEDILOL (COREG) 3.125 MG TABLET    Take 1 tablet (3.125 mg total) by mouth 2 (two) times daily with meals.       Review of Systems   Constitutional:  Negative for diaphoresis and fever.   HENT:  Negative for congestion and hearing loss.    Eyes:  Negative for blurred vision and pain.   Cardiovascular:  Positive for leg swelling. Negative for chest pain, claudication, dyspnea on exertion, near-syncope, palpitations and syncope.   Respiratory:  Negative for shortness of breath and sleep disturbances due to breathing.    Hematologic/Lymphatic: Negative for bleeding problem. Does not bruise/bleed easily.   Skin:  Negative for color change and poor wound healing.   Gastrointestinal:  Negative for abdominal pain and nausea.   Genitourinary:  Negative for bladder incontinence and flank pain.   Neurological:  Negative for focal weakness and light-headedness.      Objective:   BP (!) 142/84   Pulse 70   Wt 112 kg (247 lb)   SpO2 100%   BMI 35.44 kg/m²    Physical Exam  Constitutional:       Appearance: He is well-developed. He is not diaphoretic.   HENT:      Head: Normocephalic and atraumatic.   Eyes:      General: No scleral icterus.     Pupils: Pupils are equal, round, and reactive to light.   Neck:      Vascular: No JVD.   Cardiovascular:      Rate and Rhythm: Regular rhythm. Bradycardia present.      Pulses: Intact distal pulses.           Radial pulses are 2+ on the right side and 2+ on the left side.        Dorsalis pedis pulses are 2+ on the right side and 2+ on the left side.        Posterior tibial pulses are 2+ on the right side and 2+ on the left side.      Heart sounds: S1 normal and S2 normal. No murmur heard.    No friction rub. No gallop.   Pulmonary:      Effort: Pulmonary effort is normal. No respiratory distress.      Breath sounds: Normal breath sounds. No wheezing or rales.   Chest:      Chest wall: No tenderness.   Abdominal:      General: Bowel sounds are normal. There is no distension.      Palpations: Abdomen is soft. There is no mass.      Tenderness: There is no abdominal tenderness. There is no rebound.   Musculoskeletal:         General:  No tenderness. Normal range of motion.      Cervical back: Normal range of motion and neck supple.      Right lower le+ Edema present.      Left lower le+ Edema present.   Skin:     General: Skin is warm and dry.      Coloration: Skin is not pale.   Neurological:      Mental Status: He is alert and oriented to person, place, and time.      Coordination: Coordination normal.      Deep Tendon Reflexes: Reflexes normal.   Psychiatric:         Behavior: Behavior normal.         Judgment: Judgment normal.         EKG 23- reviewed  A-Fib w T wave abnormality, HR 70    Cardiac echo 4/3/23-reviewed   Summary    The left ventricle is mildly enlarged with marked concentric hypertrophy and moderately decreased systolic function. The estimated ejection fraction is 30-35%.  There is an increased density to the thickened LV myocardium suggestive of an infiltrative process (e.g., amyloidosis)  Normal right ventricular size with normal right ventricular systolic function.  Severe biatrial enlargement.  Trivial pericardial effusion.  The pulmonary artery systolic pressure could not be estimated due to the lack of tricuspid insufficiency.  Normal central venous pressure (3 mmHg).  Atrial fibrillation observed    Assessment:       1. Chronic systolic congestive heart failure    2. Essential hypertension    3. Stage 3a chronic kidney disease    4. Persistent atrial fibrillation    5. Bladder mass    6. Kidney stone    7. MEHNAZ (obstructive sleep apnea)    8. Pre-operative cardiovascular examination             Plan:         Chronic systolic congestive heart failure  Patient is identified as having Systolic (HFrEF) heart failure that is Chronic. CHF is currently controlled. Latest ECHO performed and demonstrates- Results for orders placed during the hospital encounter of 23    Echo    Interpretation Summary  · The left ventricle is mildly enlarged with marked concentric hypertrophy and moderately decreased systolic  function. The estimated ejection fraction is 30-35%.  · There is an increased density to the thickened LV myocardium suggestive of an infiltrative process (e.g., amyloidosis)  · Normal right ventricular size with normal right ventricular systolic function.  · Severe biatrial enlargement.  · Trivial pericardial effusion.  · The pulmonary artery systolic pressure could not be estimated due to the lack of tricuspid insufficiency.  · Normal central venous pressure (3 mmHg).  · Atrial fibrillation observed.  . Continue Beta Blocker, ACE/ARB and Furosemide and monitor clinical status closely. Monitor on telemetry. Patient is on CHF pathway.  Monitor strict Is&Os and daily weights.  Place on fluid restriction of 2 L. Continue to stress to patient importance of self efficacy and  on diet for CHF. Last BNP reviewed- and noted below @LABRCNTIP(BNP,BNPTRIAGEBLO)@.    -increase Carvedilol to 6.25 mg   -continue  lisinopril 40 mg (will DC when  Entresto obtained),  furosemide 40 mg  BID   -spironolactone   -Creatanine at baseline 1.43 (stable baseline   -start Entresto 24-26 mg and titrate to GDMT   -continue titrate medications to GDMT   -Discussed lifestyle modifications- diet, exercise, weight loss  -instructed to weigh self daily, notify office if > 3 pound weight gain in 1 day or 5 pounds in 1 week     Essential hypertension  -Goal BP < 130/80  -continue medication regimen: lisinopril 40 mg, carvedilol 3.125 mg   -continue  furosemide  40 mg BID   -continue HF management  -discussed lifestyle modifications- diet, exercise, weight loss   -check BP twice daily and maintain log, bring log to all appts.     Stage 3a chronic kidney disease  -Baseline Cr 1.4, CKD clinically related to longstanding HTN   creatinine 1.43 5/16/23   - Following with Urology for renal stones  - Avoid NSAIDs, increase hydration, recommend further BP control     Atrial fibrillation  -[id number]  -continue AC therapy-apixaban 5 mg  -repeat EKG  4/14/23-reviewed A-Fib w HR 65     Bladder mass  -Followed by Urology     Kidney stone  -Followed by Urology     Obesity, Class II, BMI 35-39.9, with comorbidity  -encouraged weight loss  -discussed health risk associated w obesity     MEHNAZ (obstructive sleep apnea)  -not currently on CPAP     Pre-operative cardiovascular examination    Cardiac Risk Estimation: per the Revised Cardiac Risk Index (Circ. 100:1043, 1999), the patient's risk factors for cardiac complications include history of congestive heart failure, putting him in: RCI RISK CLASS II (1 risk factor, risk of major cardiac compl. appr. 1.3%)  -Okay to hold  ASA 3 days prior to procedure and restart after procedure   -Current off AC therapy 2/2 cost (attempting to obtain financial  Assistance), will restart post procedure             Total duration of face to face visit time 30 minutes.  Total time spent counseling greater than fifty percent of total visit time.  Counseling included discussion regarding imaging findings, diagnosis, possibilities, treatment options, risks and benefits.  The patient had many questions regarding the options and long-term effects      Miki Salgado NP  Cardiology

## 2023-05-24 NOTE — ASSESSMENT & PLAN NOTE
Cardiac Risk Estimation: per the Revised Cardiac Risk Index (Circ. 100:1043, 1999), the patient's risk factors for cardiac complications include history of congestive heart failure, putting him in: RCI RISK CLASS II (1 risk factor, risk of major cardiac compl. appr. 1.3%)  -Okay to hold  ASA 3 days prior to procedure and restart after procedure   -Current off AC therapy 2/2 cost (attempting to obtain financial  Assistance), will restart post procedure

## 2023-05-24 NOTE — ASSESSMENT & PLAN NOTE
Patient is identified as having Systolic (HFrEF) heart failure that is Chronic. CHF is currently controlled. Latest ECHO performed and demonstrates- Results for orders placed during the hospital encounter of 04/01/23    Echo    Interpretation Summary  · The left ventricle is mildly enlarged with marked concentric hypertrophy and moderately decreased systolic function. The estimated ejection fraction is 30-35%.  · There is an increased density to the thickened LV myocardium suggestive of an infiltrative process (e.g., amyloidosis)  · Normal right ventricular size with normal right ventricular systolic function.  · Severe biatrial enlargement.  · Trivial pericardial effusion.  · The pulmonary artery systolic pressure could not be estimated due to the lack of tricuspid insufficiency.  · Normal central venous pressure (3 mmHg).  · Atrial fibrillation observed.  . Continue Beta Blocker, ACE/ARB and Furosemide and monitor clinical status closely. Monitor on telemetry. Patient is on CHF pathway.  Monitor strict Is&Os and daily weights.  Place on fluid restriction of 2 L. Continue to stress to patient importance of self efficacy and  on diet for CHF. Last BNP reviewed- and noted below @LABRCNTIP(BNP,BNPTRIAGEBLO)@.    -increase Carvedilol to 6.25 mg   -continue  lisinopril 40 mg (will DC when  Entresto obtained),  furosemide 40 mg  BID   -spironolactone   -Creatanine at baseline 1.43 (stable baseline   -start Entresto 24-26 mg and titrate to GDMT   -continue titrate medications to GDMT   -Discussed lifestyle modifications- diet, exercise, weight loss  -instructed to weigh self daily, notify office if > 3 pound weight gain in 1 day or 5 pounds in 1 week

## 2023-05-24 NOTE — ASSESSMENT & PLAN NOTE
-Goal BP < 130/80  -continue medication regimen: lisinopril 40 mg, carvedilol 3.125 mg   -continue  furosemide  40 mg BID   -continue HF management  -discussed lifestyle modifications- diet, exercise, weight loss   -check BP twice daily and maintain log, bring log to all appts.

## 2023-05-25 ENCOUNTER — TELEPHONE (OUTPATIENT)
Dept: CARDIOLOGY | Facility: CLINIC | Age: 68
End: 2023-05-25

## 2023-05-25 ENCOUNTER — PATIENT MESSAGE (OUTPATIENT)
Dept: CARDIOLOGY | Facility: CLINIC | Age: 68
End: 2023-05-25

## 2023-05-25 NOTE — TELEPHONE ENCOUNTER
Please inform Mr. Bernal to restart his Eliquis (blood thinner) as his procedure has been cancelled. I received notification from Martínez Landon.  Continue lisinopril until able to obtain Entresto.

## 2023-05-25 NOTE — TELEPHONE ENCOUNTER
Spoke with Juhi regarding pts entresto. Informed Ms. Juhi that I have reached out to our assistance department (due to possible price of med) and informed pt/ pt's sister to give us a call if they run into any issues when picking up the med later today.

## 2023-05-26 ENCOUNTER — ANTI-COAG VISIT (OUTPATIENT)
Dept: CARDIOLOGY | Facility: CLINIC | Age: 68
End: 2023-05-26

## 2023-05-26 ENCOUNTER — PATIENT MESSAGE (OUTPATIENT)
Dept: CARDIOLOGY | Facility: CLINIC | Age: 68
End: 2023-05-26

## 2023-05-26 DIAGNOSIS — Z79.01 LONG TERM (CURRENT) USE OF ANTICOAGULANTS: Primary | ICD-10-CM

## 2023-05-26 DIAGNOSIS — I48.19 PERSISTENT ATRIAL FIBRILLATION: Primary | ICD-10-CM

## 2023-05-26 DIAGNOSIS — I48.19 PERSISTENT ATRIAL FIBRILLATION: ICD-10-CM

## 2023-05-26 RX ORDER — WARFARIN SODIUM 5 MG/1
5 TABLET ORAL DAILY
Qty: 40 TABLET | Refills: 1 | Status: SHIPPED | OUTPATIENT
Start: 2023-05-26 | End: 2023-06-15

## 2023-05-26 NOTE — PROGRESS NOTES
Spoke to patient regarding enrollment into the Coumadin Clinic. Patient verified that he has a peach 5mg warfarin tablet. Patient advised on a warfarin dose of 7.5mg (1.5 tablets) ,on 5/26, then 5mg (1 tablet) qPM. INR scheduled for 05.30.23, at [facility].    Patient educated on diet and when to call. Patient's questions and concerns addressed at this time, and he expressed understanding. Education sheets sent via patient portal to reinforce teaching.    Patient will eat foods with vitamin K 2x/week, and avoid EtOH. The risks associated with consuming EtOH, while taking warfarin, and the importance of a consistent diet discussed.

## 2023-05-26 NOTE — PROGRESS NOTES
Coumadin Clinic Enrollment:    PMH significant for HTN, HF, MEHNAZ, obesity and atrial fibrillation. Patient previously on apixaban, transitioning to warfarin due to cost. Plan to start warfarin today per calendar and to overlap with apixaban until INR at least 2. Warfarin 5 mg rx sent to pharmacy on file. Check INR 5/30/23.     Tawana Fowler is a 31 y.o.male who presents to the ED with complaints of a subjective fever. The patient reports his fever has been worsening since gradual onset earlier today. He has not taken any medication for his symptoms. He also complains of fatigue, chills, myalgias, dizziness, nausea, abdominal pain, diarrhea, and a headache, but he denies any chest pain, shortness of breath, or vomiting. There are no other complaints at this time.         History provided by:  Patient  Fever   Temp source:  Subjective  Severity:  Moderate  Onset quality:  Gradual  Duration:  1 day  Timing:  Constant  Progression:  Worsening  Chronicity:  New  Relieved by:  None tried  Worsened by:  Nothing  Ineffective treatments:  None tried  Associated symptoms: chills, diarrhea, headaches, myalgias and nausea    Associated symptoms: no chest pain and no vomiting        Review of Systems   Constitutional: Positive for chills, fatigue and fever.   Respiratory: Negative for shortness of breath.    Cardiovascular: Negative for chest pain.   Gastrointestinal: Positive for abdominal pain, diarrhea and nausea. Negative for vomiting.   Musculoskeletal: Positive for myalgias.   Neurological: Positive for dizziness and headaches.   All other systems reviewed and are negative.      History reviewed. No pertinent past medical history.    No Known Allergies    History reviewed. No pertinent surgical history.    History reviewed. No pertinent family history.    Social History     Socioeconomic History   • Marital status:      Spouse name: Not on file   • Number of children: Not on file   • Years of education: Not on file   • Highest education level: Not on file   Tobacco Use   • Smoking status: Never Smoker   • Smokeless tobacco: Never Used   Substance and Sexual Activity   • Alcohol use: No     Frequency: Never   • Drug use: No   • Sexual activity: Defer         Objective   Physical Exam   Constitutional: He is oriented to person,  place, and time. He appears well-developed and well-nourished. No distress.   HENT:   Head: Normocephalic and atraumatic.   Nose: Nose normal.   Eyes: Conjunctivae are normal. No scleral icterus.   Neck: Normal range of motion. Neck supple.   Cardiovascular: Regular rhythm, normal heart sounds and intact distal pulses. Tachycardia present.   No murmur heard.  Pulmonary/Chest: Effort normal and breath sounds normal. No respiratory distress.   Abdominal: Soft. Bowel sounds are normal. There is no tenderness.   Musculoskeletal: Normal range of motion. He exhibits no edema or tenderness.   Neurological: He is alert and oriented to person, place, and time.   Skin: Skin is warm and dry.   Psychiatric: He has a normal mood and affect. His behavior is normal.   Nursing note and vitals reviewed.      Procedures         ED Course  ED Course as of Aug 06 2327   Tue Aug 06, 2019   2059 Temp: (!) 102.8 °F (39.3 °C) [RS]   2059 Heart Rate: (!) 129 [RS]   2112 WBC: (!) 12.62 [RS]   2126 Lactate: 0.8 [RS]   2227 Patient reports significant improvement in the symptoms and is resting comfortably.  Findings most consistent with viral syndrome. I had a discussion with the patient/family regarding diagnosis, diagnostic results, treatment plan, and medications.  The patient/family indicated understanding of these instructions.  I spent adequate time at the bedside proceeding discharge necessary to personally discuss the aftercare instructions, giving patient education, providing explanations of the results of our evaluations/findings, and my decision making to assure that the patient/family understand the plan of care.  Time was allotted to answer questions at that time and throughout the ED course.  Emphasis was placed on timely follow-up after discharge.  I also discussed the potential for the development of an acute emergent condition requiring further evaluation, admission, or even surgical intervention. I discussed that we found  nothing during the visit today indicating the need for further workup, admission, or the presence of an unstable medical condition.  I encouraged the patient to return to the emergency department immediately for ANY concerns, worsening, new complaints, or if symptoms persist and unable to seek follow-up in a timely fashion.  The patient/family expressed understanding and agreement with this plan.   [RS]      ED Course User Index  [RS] Kendall Villafuerte MD     Recent Results (from the past 24 hour(s))   Comprehensive Metabolic Panel    Collection Time: 08/06/19  8:49 PM   Result Value Ref Range    Glucose 122 (H) 65 - 99 mg/dL    BUN 13 6 - 20 mg/dL    Creatinine 0.97 0.76 - 1.27 mg/dL    Sodium 137 136 - 145 mmol/L    Potassium 3.7 3.5 - 5.2 mmol/L    Chloride 99 98 - 107 mmol/L    CO2 23.0 22.0 - 29.0 mmol/L    Calcium 8.8 8.6 - 10.5 mg/dL    Total Protein 7.8 6.0 - 8.5 g/dL    Albumin 4.30 3.50 - 5.20 g/dL    ALT (SGPT) 33 1 - 41 U/L    AST (SGOT) 26 1 - 40 U/L    Alkaline Phosphatase 78 39 - 117 U/L    Total Bilirubin 0.5 0.2 - 1.2 mg/dL    eGFR Non African Amer 92 >60 mL/min/1.73    eGFR  African Amer 111 >60 mL/min/1.73    Globulin 3.5 gm/dL    A/G Ratio 1.2 g/dL    BUN/Creatinine Ratio 13.4 7.0 - 25.0    Anion Gap 15.0 5.0 - 15.0 mmol/L   Lactic Acid, Plasma    Collection Time: 08/06/19  8:49 PM   Result Value Ref Range    Lactate 0.8 0.5 - 2.0 mmol/L   CBC Auto Differential    Collection Time: 08/06/19  8:49 PM   Result Value Ref Range    WBC 12.62 (H) 3.40 - 10.80 10*3/mm3    RBC 5.30 4.14 - 5.80 10*6/mm3    Hemoglobin 15.2 13.0 - 17.7 g/dL    Hematocrit 43.9 37.5 - 51.0 %    MCV 82.8 79.0 - 97.0 fL    MCH 28.7 26.6 - 33.0 pg    MCHC 34.6 31.5 - 35.7 g/dL    RDW 12.3 12.3 - 15.4 %    RDW-SD 37.2 37.0 - 54.0 fl    MPV 9.1 6.0 - 12.0 fL    Platelets 352 140 - 450 10*3/mm3    Neutrophil % 89.0 (H) 42.7 - 76.0 %    Lymphocyte % 5.9 (L) 19.6 - 45.3 %    Monocyte % 4.4 (L) 5.0 - 12.0 %    Eosinophil % 0.2 (L)  0.3 - 6.2 %    Basophil % 0.2 0.0 - 1.5 %    Immature Grans % 0.3 0.0 - 0.5 %    Neutrophils, Absolute 11.23 (H) 1.70 - 7.00 10*3/mm3    Lymphocytes, Absolute 0.75 0.70 - 3.10 10*3/mm3    Monocytes, Absolute 0.55 0.10 - 0.90 10*3/mm3    Eosinophils, Absolute 0.02 0.00 - 0.40 10*3/mm3    Basophils, Absolute 0.03 0.00 - 0.20 10*3/mm3    Immature Grans, Absolute 0.04 0.00 - 0.05 10*3/mm3    nRBC 0.0 0.0 - 0.2 /100 WBC   Light Blue Top    Collection Time: 08/06/19  8:49 PM   Result Value Ref Range    Extra Tube hold for add-on    Green Top (Gel)    Collection Time: 08/06/19  8:49 PM   Result Value Ref Range    Extra Tube Hold for add-ons.    Lavender Top    Collection Time: 08/06/19  8:49 PM   Result Value Ref Range    Extra Tube hold for add-on    Gold Top - SST    Collection Time: 08/06/19  8:49 PM   Result Value Ref Range    Extra Tube Hold for add-ons.    Influenza Antigen, Rapid - Swab, Nasopharynx    Collection Time: 08/06/19  9:52 PM   Result Value Ref Range    Influenza A Ag, EIA Negative Negative    Influenza B Ag, EIA Negative Negative     Note: In addition to lab results from this visit, the labs listed above may include labs taken at another facility or during a different encounter within the last 24 hours. Please correlate lab times with ED admission and discharge times for further clarification of the services performed during this visit.    No orders to display     Vitals:    08/06/19 2200 08/06/19 2210 08/06/19 2215 08/06/19 2220   BP: 113/70   115/73   BP Location:       Patient Position:       Pulse:       Resp:       Temp:       TempSrc:       SpO2:  93% 94% 93%   Weight:       Height:         Medications   sodium chloride 0.9 % flush 10 mL (not administered)   lactated ringers bolus 1,000 mL (0 mL Intravenous Stopped 8/6/19 2230)   acetaminophen (TYLENOL) tablet 1,000 mg (1,000 mg Oral Given 8/6/19 2118)   ketorolac (TORADOL) injection 10 mg (10 mg Intravenous Given 8/6/19 2119)   ondansetron  (ZOFRAN) injection 4 mg (4 mg Intravenous Given 8/6/19 2120)     ECG/EMG Results (last 24 hours)     ** No results found for the last 24 hours. **        No orders to display                       MDM  Number of Diagnoses or Management Options  Acute febrile illness:   Acute viral syndrome:   Influenza-like illness:      Amount and/or Complexity of Data Reviewed  Clinical lab tests: reviewed        Final diagnoses:   Acute viral syndrome   Acute febrile illness   Influenza-like illness       Documentation assistance provided by edwardo Stern.  Information recorded by the edwardo was done at my direction and has been verified and validated by me.     Hardeep Stern  08/06/19 2107       Hardeep Stern  08/06/19 2692       Kendall Villafuerte MD  08/06/19 0902

## 2023-05-29 ENCOUNTER — OFFICE VISIT (OUTPATIENT)
Dept: FAMILY MEDICINE | Facility: CLINIC | Age: 68
End: 2023-05-29

## 2023-05-29 ENCOUNTER — TELEPHONE (OUTPATIENT)
Dept: PHARMACY | Facility: CLINIC | Age: 68
End: 2023-05-29

## 2023-05-29 ENCOUNTER — PATIENT MESSAGE (OUTPATIENT)
Dept: PHARMACY | Facility: CLINIC | Age: 68
End: 2023-05-29

## 2023-05-29 ENCOUNTER — TELEPHONE (OUTPATIENT)
Dept: FAMILY MEDICINE | Facility: CLINIC | Age: 68
End: 2023-05-29

## 2023-05-29 ENCOUNTER — TELEPHONE (OUTPATIENT)
Dept: CARDIOLOGY | Facility: CLINIC | Age: 68
End: 2023-05-29

## 2023-05-29 VITALS
HEART RATE: 80 BPM | SYSTOLIC BLOOD PRESSURE: 180 MMHG | WEIGHT: 249.13 LBS | HEIGHT: 70 IN | BODY MASS INDEX: 35.66 KG/M2 | DIASTOLIC BLOOD PRESSURE: 100 MMHG | OXYGEN SATURATION: 99 %

## 2023-05-29 DIAGNOSIS — R71.8 MICROCYTOSIS: ICD-10-CM

## 2023-05-29 DIAGNOSIS — N13.8 BPH WITH URINARY OBSTRUCTION: ICD-10-CM

## 2023-05-29 DIAGNOSIS — N18.31 STAGE 3A CHRONIC KIDNEY DISEASE: ICD-10-CM

## 2023-05-29 DIAGNOSIS — I50.42 CHRONIC COMBINED SYSTOLIC AND DIASTOLIC CHF, NYHA CLASS 2: ICD-10-CM

## 2023-05-29 DIAGNOSIS — R73.03 PREDIABETES: ICD-10-CM

## 2023-05-29 DIAGNOSIS — I48.19 PERSISTENT ATRIAL FIBRILLATION: ICD-10-CM

## 2023-05-29 DIAGNOSIS — I10 ESSENTIAL HYPERTENSION: ICD-10-CM

## 2023-05-29 DIAGNOSIS — N40.1 BPH WITH URINARY OBSTRUCTION: ICD-10-CM

## 2023-05-29 DIAGNOSIS — Z00.00 ENCOUNTER FOR MEDICAL EXAMINATION TO ESTABLISH CARE: ICD-10-CM

## 2023-05-29 PROCEDURE — 99999 PR PBB SHADOW E&M-EST. PATIENT-LVL IV: CPT | Mod: PBBFAC,,, | Performed by: STUDENT IN AN ORGANIZED HEALTH CARE EDUCATION/TRAINING PROGRAM

## 2023-05-29 PROCEDURE — 99205 OFFICE O/P NEW HI 60 MIN: CPT | Mod: S$PBB,,, | Performed by: STUDENT IN AN ORGANIZED HEALTH CARE EDUCATION/TRAINING PROGRAM

## 2023-05-29 PROCEDURE — 99999 PR PBB SHADOW E&M-EST. PATIENT-LVL IV: ICD-10-PCS | Mod: PBBFAC,,, | Performed by: STUDENT IN AN ORGANIZED HEALTH CARE EDUCATION/TRAINING PROGRAM

## 2023-05-29 PROCEDURE — 99205 PR OFFICE/OUTPT VISIT, NEW, LEVL V, 60-74 MIN: ICD-10-PCS | Mod: S$PBB,,, | Performed by: STUDENT IN AN ORGANIZED HEALTH CARE EDUCATION/TRAINING PROGRAM

## 2023-05-29 PROCEDURE — 99214 OFFICE O/P EST MOD 30 MIN: CPT | Mod: PBBFAC,PN | Performed by: STUDENT IN AN ORGANIZED HEALTH CARE EDUCATION/TRAINING PROGRAM

## 2023-05-29 RX ORDER — ATORVASTATIN CALCIUM 40 MG/1
40 TABLET, FILM COATED ORAL DAILY
Qty: 90 TABLET | Refills: 3 | Status: SHIPPED | OUTPATIENT
Start: 2023-05-29

## 2023-05-29 RX ORDER — DUTASTERIDE 0.5 MG/1
0.5 CAPSULE, LIQUID FILLED ORAL DAILY
Qty: 90 CAPSULE | Refills: 3 | Status: SHIPPED | OUTPATIENT
Start: 2023-05-29 | End: 2024-02-29

## 2023-05-29 RX ORDER — CARVEDILOL 12.5 MG/1
12.5 TABLET ORAL 2 TIMES DAILY WITH MEALS
Qty: 180 TABLET | Refills: 3 | Status: SHIPPED | OUTPATIENT
Start: 2023-05-29 | End: 2023-06-12

## 2023-05-29 RX ORDER — DUTASTERIDE 0.5 MG/1
0.5 CAPSULE, LIQUID FILLED ORAL DAILY
Qty: 90 CAPSULE | Refills: 3 | Status: SHIPPED | OUTPATIENT
Start: 2023-05-29 | End: 2023-05-29

## 2023-05-29 NOTE — PATIENT INSTRUCTIONS
It is okay to take your water pill (lasix) between 8am-4p ( meaning take the first pill by 8am, second by 4pm)       Please get your shingles vaccine from any local pharmacy of your choice

## 2023-05-29 NOTE — TELEPHONE ENCOUNTER
----- Message from Sofia Peterson sent at 5/25/2023  9:25 AM CDT -----  Regarding: FW: Entresto  Hello,     Thank you for submitting a referral to the Pharmacy Patient Assistance Team. We have received your referral for Juvenal Benral. This patient case has been assigned to TERRANCE DIAZ, who will review the case and contact the patient with assistance options. You may review progress in the patient's chart through Telephone Encounter notes from Pharmacy Patient Assistance.       Thank you,     Pharmacy Patient Assistance Team   ----- Message -----  From: Catherine Blount MA  Sent: 5/25/2023   8:17 AM CDT  To: Pharmacy Patient Assistance Team  Subject: Entresto                                         Pt was recently prescribed Entresto, please assist with the price of the medication if possible.      Thank you!  Catherine CISNEROS CMA

## 2023-05-29 NOTE — PROGRESS NOTES
Subjective:    Patient ID:     Referring Provider:     HPI:   68 yo M with prediabetes, microcytosis, CKD stage 3a, sHTN, A-fib, HFrEF ( LVEF 30-35% in 04/2023), MEHNAZ, obesity  who presents to [facility] and hospital f/u visit. He was recently managed for suspected TIA, MYLES and incidental findings id bladder mass 4/5/23-4/6/23 following c/o blurred vision and speech difficulty. CT head negative, Carotid doppler showed no significant stenosis. He denies CP, SOB, palpitations, orthopnea, PND, pre-syncope, LOC, swelling, or claudication. He does not monitor his home BP. Patient also notes dietary noncompliance with low salt diet. He has been unable to start entresto due to costly copay and also could not afford eliquis hence started on warfarin. He has been compliant with all his medications. He also described his mood to be great today.    Past Medical History:   Diagnosis Date    Anticoagulant long-term use     Arthritis     knee    Atrial fibrillation     Hypertension      Past Surgical History:   Procedure Laterality Date    TOTAL HIP ARTHROPLASTY Right 2013     Social History     Socioeconomic History    Marital status:    Tobacco Use    Smoking status: Never    Smokeless tobacco: Never   Substance and Sexual Activity    Alcohol use: No     Alcohol/week: 0.0 standard drinks    Sexual activity: Yes     Partners: Female     History reviewed. No pertinent family history.    Review of patient's allergies indicates:  No Known Allergies    Medication List with Changes/Refills   New Medications    CARVEDILOL (COREG) 12.5 MG TABLET    Take 1 tablet (12.5 mg total) by mouth 2 (two) times daily with meals.   Current Medications    ASPIRIN (ECOTRIN) 81 MG EC TABLET    Take 1 tablet (81 mg total) by mouth once daily.    FUROSEMIDE (LASIX) 40 MG TABLET    Take 1 tablet (40 mg total) by mouth 2 (two) times daily.    LISINOPRIL (PRINIVIL,ZESTRIL) 40 MG TABLET    Take 1 tablet (40 mg total) by mouth once daily.     "SPIRONOLACTONE (ALDACTONE) 25 MG TABLET    Take 0.5 tablets (12.5 mg total) by mouth once daily.    WARFARIN (COUMADIN) 5 MG TABLET    Take 1 tablet (5 mg total) by mouth Daily. OR AS DIRECTED BY COUMADIN CLINIC   Changed and/or Refilled Medications    Modified Medication Previous Medication    ATORVASTATIN (LIPITOR) 40 MG TABLET atorvastatin (LIPITOR) 40 MG tablet       Take 1 tablet (40 mg total) by mouth once daily.    Take 1 tablet (40 mg total) by mouth once daily.    DUTASTERIDE (AVODART) 0.5 MG CAPSULE dutasteride (AVODART) 0.5 mg capsule       Take 1 capsule (0.5 mg total) by mouth once daily.    Take 1 capsule (0.5 mg total) by mouth once daily.   Discontinued Medications    CARVEDILOL (COREG) 6.25 MG TABLET    Take 1 tablet (6.25 mg total) by mouth 2 (two) times daily with meals.    SACUBITRIL-VALSARTAN (ENTRESTO) 24-26 MG PER TABLET    Take 1 tablet by mouth 2 (two) times daily.       Review of Systems   Constitutional: Negative for diaphoresis and fever.   HENT:  Negative for congestion and hearing loss.    Eyes:  Negative for blurred vision and pain.   Cardiovascular:  Positive for leg swelling. Negative for chest pain, claudication, dyspnea on exertion, near-syncope, palpitations and syncope.   Respiratory:  Negative for shortness of breath and sleep disturbances due to breathing.    Hematologic/Lymphatic: Negative for bleeding problem. Does not bruise/bleed easily.   Skin:  Negative for color change and poor wound healing.   Gastrointestinal:  Negative for abdominal pain and nausea.   Genitourinary:  Negative for bladder incontinence and flank pain.   Neurological:  Negative for focal weakness and light-headedness.      Objective:   BP (!) 180/100   Pulse 80   Ht 5' 10" (1.778 m)   Wt 113 kg (249 lb 1.9 oz)   SpO2 99%   BMI 35.74 kg/m²    Physical Exam  Vitals reviewed.   Constitutional:       General: He is not in acute distress.     Appearance: He is well-developed. He is not diaphoretic. "   HENT:      Head: Normocephalic and atraumatic.      Mouth/Throat:      Mouth: Mucous membranes are moist.      Pharynx: Oropharynx is clear.   Eyes:      General: No scleral icterus.     Pupils: Pupils are equal, round, and reactive to light.   Neck:      Vascular: No JVD.   Cardiovascular:      Rate and Rhythm: Bradycardia present. Rhythm irregular.      Pulses: Intact distal pulses.           Radial pulses are 2+ on the right side and 2+ on the left side.        Dorsalis pedis pulses are 2+ on the right side and 2+ on the left side.        Posterior tibial pulses are 2+ on the right side and 2+ on the left side.      Heart sounds: S1 normal and S2 normal. No murmur heard.    No friction rub. No gallop.   Pulmonary:      Effort: Pulmonary effort is normal. No respiratory distress.      Breath sounds: Normal breath sounds. No wheezing or rales.   Chest:      Chest wall: No tenderness.   Abdominal:      General: Bowel sounds are normal. There is no distension.      Palpations: Abdomen is soft. There is no mass.      Tenderness: There is no abdominal tenderness. There is no rebound.   Musculoskeletal:         General: No tenderness. Normal range of motion.      Cervical back: Normal range of motion and neck supple.      Right lower le+ Edema ((2+ bilaterally)) present.      Left lower le+ Edema present.   Skin:     General: Skin is warm and dry.      Coloration: Skin is not pale.   Neurological:      Mental Status: He is alert and oriented to person, place, and time.      Coordination: Coordination normal.      Deep Tendon Reflexes: Reflexes normal.   Psychiatric:         Behavior: Behavior normal.         Judgment: Judgment normal.         EKG 23- reviewed  A-Fib w T wave abnormality, HR 70    Cardiac echo 4/3/23-reviewed   Summary    The left ventricle is mildly enlarged with marked concentric hypertrophy and moderately decreased systolic function. The estimated ejection fraction is 30-35%.  There is  an increased density to the thickened LV myocardium suggestive of an infiltrative process (e.g., amyloidosis)  Normal right ventricular size with normal right ventricular systolic function.  Severe biatrial enlargement.  Trivial pericardial effusion.  The pulmonary artery systolic pressure could not be estimated due to the lack of tricuspid insufficiency.  Normal central venous pressure (3 mmHg).  Atrial fibrillation observed    Assessment:       1. Encounter for medical examination to establish care    2. Essential hypertension    3. Chronic combined systolic and diastolic CHF, NYHA class 2    4. Persistent atrial fibrillation    5. Prediabetes    6. Stage 3a chronic kidney disease    7. BPH with urinary obstruction    8. Microcytosis             Plan:     -Uncontrolled BP today despite compliance with medications  -compensated on examination  - asymptomatic despite BP readings in the 180/100  -He denies any bleeding from any orifices  -increase coreg to 12.5mg BID  -could not afford entresto, c/w lisinopril 40mg daily   -c/w other regimen as prescribed  -counseled on life style modifications (low salt diet <2g, <2L fluid intake)  -c/w daily exercise (30 minutes-45 minutes daily), healthy eating habit encouraged  -in the process of switching warfarin to eliquis once pharmacy authorizes it  -Avoid nephrotoxins  -refill of medications sent to pharmacy (lipitor, dutasteride, and coreg)  -medication reconciliation done with patient and patient verbalized understanding  -BP f/u in 2 weeks with nurse        Dr Eli Snow MD  Internal Medicine

## 2023-05-29 NOTE — TELEPHONE ENCOUNTER
Received [employer] assistance approval via fax,   Spoke with pt shortly after as they were leaving an apt with their PCP (pt and pt's sister asked me for updates on assistance regarding another med, Entresto which is still processing)  Informed pt that I have just received a fax stating that [employer] has approved assistance of Eliquis, informed pt that I will speak with the provider and give them a call regarding any next steps we need to take with management   Pt expressed understanding       Fax scanned into chart

## 2023-05-29 NOTE — TELEPHONE ENCOUNTER
I have reached out to Juvenal Bernal to inform him of the [employer] application process for Entresto and whats required to apply.  Juvenal Bernal did not answer. I left a voicemail and mailed a letter introducing him to the pharmacy patient assistance program. I will follow up in 5 business days.

## 2023-05-30 ENCOUNTER — ANTI-COAG VISIT (OUTPATIENT)
Dept: CARDIOLOGY | Facility: CLINIC | Age: 68
End: 2023-05-30

## 2023-05-30 DIAGNOSIS — I48.19 PERSISTENT ATRIAL FIBRILLATION: Primary | ICD-10-CM

## 2023-05-30 DIAGNOSIS — Z79.01 LONG TERM (CURRENT) USE OF ANTICOAGULANTS: ICD-10-CM

## 2023-05-30 PROCEDURE — 93793 ANTICOAG MGMT PT WARFARIN: CPT | Mod: ,,,

## 2023-05-30 PROCEDURE — 93793 PR ANTICOAGULANT MGMT FOR PT TAKING WARFARIN: ICD-10-PCS | Mod: ,,,

## 2023-06-02 ENCOUNTER — ANTI-COAG VISIT (OUTPATIENT)
Dept: CARDIOLOGY | Facility: CLINIC | Age: 68
End: 2023-06-02

## 2023-06-02 ENCOUNTER — PATIENT MESSAGE (OUTPATIENT)
Dept: CARDIOLOGY | Facility: CLINIC | Age: 68
End: 2023-06-02

## 2023-06-02 DIAGNOSIS — Z79.01 LONG TERM (CURRENT) USE OF ANTICOAGULANTS: ICD-10-CM

## 2023-06-02 DIAGNOSIS — I48.19 PERSISTENT ATRIAL FIBRILLATION: Primary | ICD-10-CM

## 2023-06-02 PROCEDURE — 93793 PR ANTICOAGULANT MGMT FOR PT TAKING WARFARIN: ICD-10-PCS | Mod: ,,,

## 2023-06-02 PROCEDURE — 93793 ANTICOAG MGMT PT WARFARIN: CPT | Mod: ,,,

## 2023-06-02 NOTE — PROGRESS NOTES
INR at goal. Medications, chart, and patient findings reviewed. See calendar for adjustments to dose and follow up plan.

## 2023-06-07 ENCOUNTER — ANTI-COAG VISIT (OUTPATIENT)
Dept: CARDIOLOGY | Facility: CLINIC | Age: 68
End: 2023-06-07

## 2023-06-07 DIAGNOSIS — I48.19 PERSISTENT ATRIAL FIBRILLATION: Primary | ICD-10-CM

## 2023-06-07 DIAGNOSIS — Z79.01 LONG TERM (CURRENT) USE OF ANTICOAGULANTS: ICD-10-CM

## 2023-06-07 PROCEDURE — 93793 ANTICOAG MGMT PT WARFARIN: CPT | Mod: ,,,

## 2023-06-07 PROCEDURE — 93793 PR ANTICOAGULANT MGMT FOR PT TAKING WARFARIN: ICD-10-PCS | Mod: ,,,

## 2023-06-07 NOTE — PROGRESS NOTES
[facility]    06/07/2023 2:38 PM    Assessment/Plan:    Patient presents today with supratherapeutic INR.    Assessment of patient findings and chart review: Patient eats peanut butter several times per week, will be consistent.     Recommendation for patient's warfarin regimen: Decrease maintenance dose    Recommend repeat INR in 5 days  _________________________________________________________________    Juvenal Bernal (67 y.o.) is followed by the [facility].    Anticoagulation Summary  As of 6/7/2023      INR goal:  2.0-3.0   TTR:  22.8 % (2 d)   INR used for dosing:  3.6 (6/7/2023)   Warfarin maintenance plan:  5 mg (5 mg x 1) every day   Weekly warfarin total:  35 mg   Plan last modified:  Leilani Mcknight, PharmD (6/7/2023)   Next INR check:  6/12/2023   Target end date:      Indications    Persistent atrial fibrillation [I48.19]  Long term (current) use of anticoagulants [Z79.01]                 Anticoagulation Episode Summary       INR check location:      Preferred lab:      Send INR reminders to:  [facility] COUMADIN MONITORING POOL    Comments:  [facility]          Anticoagulation Care Providers       Provider Role Specialty Phone number    Miki Salgado NP Referring Cardiology [phone]    Daniel Carrasco III, MD Responsible Interventional Cardiology [phone]            Patient Findings       Positives:  Change in medications    Negatives:  Signs/symptoms of thrombosis, Signs/symptoms of bleeding, Laboratory test error suspected, Change in health, Change in alcohol use, Change in activity, Upcoming invasive procedure, Emergency department visit, Upcoming dental procedure, Missed doses, Extra doses, Change in diet/appetite, Hospital admission, Bruising, Other complaints    Comments:  Pt sister confirmed correct doses. Pt BP medicine was increased to 12.5mg-Carvedilol. No other cganges

## 2023-06-09 ENCOUNTER — PATIENT MESSAGE (OUTPATIENT)
Dept: FAMILY MEDICINE | Facility: CLINIC | Age: 68
End: 2023-06-09

## 2023-06-12 ENCOUNTER — ANTI-COAG VISIT (OUTPATIENT)
Dept: CARDIOLOGY | Facility: CLINIC | Age: 68
End: 2023-06-12

## 2023-06-12 ENCOUNTER — CLINICAL SUPPORT (OUTPATIENT)
Dept: FAMILY MEDICINE | Facility: CLINIC | Age: 68
End: 2023-06-12

## 2023-06-12 VITALS — HEART RATE: 61 BPM | OXYGEN SATURATION: 94 % | SYSTOLIC BLOOD PRESSURE: 160 MMHG | DIASTOLIC BLOOD PRESSURE: 82 MMHG

## 2023-06-12 DIAGNOSIS — I10 ESSENTIAL HYPERTENSION: Primary | ICD-10-CM

## 2023-06-12 DIAGNOSIS — I48.19 PERSISTENT ATRIAL FIBRILLATION: Primary | ICD-10-CM

## 2023-06-12 DIAGNOSIS — Z79.01 LONG TERM (CURRENT) USE OF ANTICOAGULANTS: ICD-10-CM

## 2023-06-12 PROCEDURE — 99999 PR PBB SHADOW E&M-EST. PATIENT-LVL II: CPT | Mod: PBBFAC,,,

## 2023-06-12 PROCEDURE — 93793 ANTICOAG MGMT PT WARFARIN: CPT | Mod: ,,,

## 2023-06-12 PROCEDURE — 93793 PR ANTICOAGULANT MGMT FOR PT TAKING WARFARIN: ICD-10-PCS | Mod: ,,,

## 2023-06-12 PROCEDURE — 99999 PR PBB SHADOW E&M-EST. PATIENT-LVL II: ICD-10-PCS | Mod: PBBFAC,,,

## 2023-06-12 PROCEDURE — 99212 OFFICE O/P EST SF 10 MIN: CPT | Mod: PBBFAC,PN

## 2023-06-12 RX ORDER — CARVEDILOL 25 MG/1
25 TABLET ORAL 2 TIMES DAILY WITH MEALS
Qty: 180 TABLET | Refills: 3 | Status: SHIPPED | OUTPATIENT
Start: 2023-06-12 | End: 2024-02-15 | Stop reason: SDUPTHER

## 2023-06-12 NOTE — PROGRESS NOTES
Juvenal Bernal 67 y.o. male is here today for Blood Pressure check.   History of HTN yes.    Review of patient's allergies indicates:  No Known Allergies  Creatinine   Date Value Ref Range Status   05/16/2023 1.43 (H) 0.50 - 1.40 mg/dL Final   05/16/2023 1.43 (H) 0.50 - 1.40 mg/dL Final     Sodium   Date Value Ref Range Status   05/16/2023 141 136 - 145 mmol/L Final   05/16/2023 141 136 - 145 mmol/L Final     Potassium   Date Value Ref Range Status   05/16/2023 4.2 3.5 - 5.1 mmol/L Final   05/16/2023 4.2 3.5 - 5.1 mmol/L Final   ]  Patient verifies taking blood pressure medications on a regular basis at the same time of the day.     Current Outpatient Medications:     aspirin (ECOTRIN) 81 MG EC tablet, Take 1 tablet (81 mg total) by mouth once daily., Disp: 30 tablet, Rfl: 2    atorvastatin (LIPITOR) 40 MG tablet, Take 1 tablet (40 mg total) by mouth once daily., Disp: 90 tablet, Rfl: 3    carvediloL (COREG) 12.5 MG tablet, Take 1 tablet (12.5 mg total) by mouth 2 (two) times daily with meals., Disp: 180 tablet, Rfl: 3    dutasteride (AVODART) 0.5 mg capsule, Take 1 capsule (0.5 mg total) by mouth once daily., Disp: 90 capsule, Rfl: 3    furosemide (LASIX) 40 MG tablet, Take 1 tablet (40 mg total) by mouth 2 (two) times daily., Disp: 60 tablet, Rfl: 11    lisinopriL (PRINIVIL,ZESTRIL) 40 MG tablet, Take 1 tablet (40 mg total) by mouth once daily., Disp: 90 tablet, Rfl: 3    spironolactone (ALDACTONE) 25 MG tablet, Take 0.5 tablets (12.5 mg total) by mouth once daily., Disp: 45 tablet, Rfl: 3    warfarin (COUMADIN) 5 MG tablet, Take 1 tablet (5 mg total) by mouth Daily. OR AS DIRECTED BY COUMADIN CLINIC, Disp: 40 tablet, Rfl: 1  Does patient have record of home blood pressure readings yes. Readings have been averaging 140/92.   Last dose of blood pressure medication was taken at this morning.  Patient is asymptomatic.   Complains of nothing.    BP: (!) 160/82 , Pulse: 61 .    Blood pressure reading after 15 minutes  was 152 /92, Pulse 53.  Dr. Joiner notified.

## 2023-06-12 NOTE — PROGRESS NOTES
[facility]    2023 11:17 AM    Assessment/Plan:    Patient presents today with therapeutic INR.    Recommendation for patient's warfarin regimen: Continue current maintenance dose    Recommend repeat INR in 1 week  ________________________________________________________________Chelo Bernal (67 y.o.) is followed by the [employer] [facility].    Anticoagulation Summary  As of 2023      INR goal:  2.0-3.0   TTR:  49.2 % (1 wk)   INR used for dosin.0 (2023)   Warfarin maintenance plan:  5 mg (5 mg x 1) every day   Weekly warfarin total:  35 mg   No change documented:  Leilani Mcknight PharmD   Plan last modified:  Leilani Mcknight PharmD (2023)   Next INR check:  2023   Target end date:      Indications    Persistent atrial fibrillation [I48.19]  Long term (current) use of anticoagulants [Z79.01]                 Anticoagulation Episode Summary       INR check location:      Preferred lab:      Send INR reminders to:  [facility] COUMADIN MONITORING POOL    Comments:  [facility]          Anticoagulation Care Providers       Provider Role Specialty Phone number    Miki Salgado NP Referring Cardiology [phone]    Daniel Carrasco III, MD Responsible Interventional Cardiology [phone]

## 2023-06-14 ENCOUNTER — TELEPHONE (OUTPATIENT)
Dept: PHARMACY | Facility: CLINIC | Age: 68
End: 2023-06-14

## 2023-06-14 ENCOUNTER — PATIENT MESSAGE (OUTPATIENT)
Dept: CARDIOLOGY | Facility: CLINIC | Age: 68
End: 2023-06-14

## 2023-06-14 NOTE — TELEPHONE ENCOUNTER
Juvenal Bernal has provided the necessary documents today to begin the enrollment process into the [employer] program. The prescription portion of the application has been sent to the providers office for approval and signature.

## 2023-06-14 NOTE — TELEPHONE ENCOUNTER
Please instruct Mr. Bernal to notify office when he receives the medication in order to verify medication and dosage at which time I will provide further instruction.

## 2023-06-15 ENCOUNTER — PATIENT MESSAGE (OUTPATIENT)
Dept: CARDIOLOGY | Facility: CLINIC | Age: 68
End: 2023-06-15

## 2023-06-15 ENCOUNTER — TELEPHONE (OUTPATIENT)
Dept: UROLOGY | Facility: CLINIC | Age: 68
End: 2023-06-15

## 2023-06-15 NOTE — TELEPHONE ENCOUNTER
Spoke to patient's sister, Juhi.  Juhi called to cancel patient appointment with Dr. Soliz.  Appointment cancelled as pt was referred to [facility].  Chapincito Scott RN

## 2023-06-15 NOTE — PROGRESS NOTES
Patient transitioning from apixaban to warfarin. INR this week was 2. Patient advised not to take warfarin tonight and start apixaban twice daily tomorrow morning, 6/16/23. Will discharge patient from the coumadin clinic at this time.

## 2023-06-15 NOTE — PROGRESS NOTES
6/15. Per Juhi, pt sister. Pt has the Eliquis but is waiting on instructions from the CC on when he could start it. It came in the mail today,

## 2023-06-15 NOTE — PROGRESS NOTES
6/15/23 Juhi (sister) called to report the Patient is being transitioned from warfarin to Eliquis and called for instructions as per Miki Salgado, NP with Cardiology, Patient next INR is due 6/19/23

## 2023-06-27 ENCOUNTER — PATIENT MESSAGE (OUTPATIENT)
Dept: ADMINISTRATIVE | Facility: HOSPITAL | Age: 68
End: 2023-06-27

## 2023-06-29 NOTE — TELEPHONE ENCOUNTER
The signed and completed patient assistance application was received from the providers office and  has been submitted to [employer] for consideration of eligibility.

## 2023-07-03 PROBLEM — J96.01 ACUTE HYPOXEMIC RESPIRATORY FAILURE: Status: RESOLVED | Noted: 2023-04-01 | Resolved: 2023-07-03

## 2023-07-05 ENCOUNTER — OFFICE VISIT (OUTPATIENT)
Dept: CARDIOLOGY | Facility: CLINIC | Age: 68
End: 2023-07-05

## 2023-07-05 VITALS
HEIGHT: 70 IN | BODY MASS INDEX: 35.49 KG/M2 | WEIGHT: 247.88 LBS | HEART RATE: 60 BPM | DIASTOLIC BLOOD PRESSURE: 75 MMHG | OXYGEN SATURATION: 98 % | SYSTOLIC BLOOD PRESSURE: 119 MMHG

## 2023-07-05 DIAGNOSIS — G47.33 OSA (OBSTRUCTIVE SLEEP APNEA): ICD-10-CM

## 2023-07-05 DIAGNOSIS — I48.19 PERSISTENT ATRIAL FIBRILLATION: ICD-10-CM

## 2023-07-05 DIAGNOSIS — N18.31 STAGE 3A CHRONIC KIDNEY DISEASE: ICD-10-CM

## 2023-07-05 DIAGNOSIS — N32.89 BLADDER MASS: ICD-10-CM

## 2023-07-05 DIAGNOSIS — Z79.01 LONG TERM (CURRENT) USE OF ANTICOAGULANTS: ICD-10-CM

## 2023-07-05 DIAGNOSIS — N20.0 KIDNEY STONE: ICD-10-CM

## 2023-07-05 DIAGNOSIS — I10 ESSENTIAL HYPERTENSION: ICD-10-CM

## 2023-07-05 DIAGNOSIS — I50.42 CHRONIC COMBINED SYSTOLIC AND DIASTOLIC CHF, NYHA CLASS 2: Primary | ICD-10-CM

## 2023-07-05 PROCEDURE — 99213 OFFICE O/P EST LOW 20 MIN: CPT | Mod: PBBFAC,PN

## 2023-07-05 PROCEDURE — 99999 PR PBB SHADOW E&M-EST. PATIENT-LVL III: CPT | Mod: PBBFAC,,,

## 2023-07-05 PROCEDURE — 99214 PR OFFICE/OUTPT VISIT, EST, LEVL IV, 30-39 MIN: ICD-10-PCS | Mod: S$PBB,,,

## 2023-07-05 PROCEDURE — 99999 PR PBB SHADOW E&M-EST. PATIENT-LVL III: ICD-10-PCS | Mod: PBBFAC,,,

## 2023-07-05 PROCEDURE — 99214 OFFICE O/P EST MOD 30 MIN: CPT | Mod: S$PBB,,,

## 2023-07-05 NOTE — PROGRESS NOTES
Subjective:    Patient ID:  Juvenal Bernal is a 67 y.o. male who presents for evaluation of No chief complaint on file.      PCP: Eli Snow MD     Referring Provider:     HPI: Patient is a 66 yo M w/H of HTN, A-fib, HFrEF ( LVEF 30-35%), MEHNAZ, obesity  who presents today to [facility] care and post hospital admission. Recent admission 4/5/23-4/6/23 w c/o blurred vision and speech difficulty 2/2 MYLES as a result of decreased hydration and diuretics for HF management. Symptoms improved with hydration. CT head negative, Carotid doppler showed no significant stenosis. Coreg for HF management decreased 2/2 bradycardia. Discharged home on medical therapy w Cardiology f/u. Incidental finding bladder mass on Ct scan and is currently following with Urology. He denies CP, SOB, palpitations, orthopnea, PND, pre-syncope, LOC, swelling, or claudication. He notes snoring. Patient noted medication compliance since discharge. He does not monitorn his home BP. Patient also notes dietary noncompliance with low salt diet. He walks daily.     5/5/23: Patient presents today for f/u appt. He was last seen on 4/14/23 post admission and HF management and was continued on medical therapy. Entresto on hold 2/2 MYLES, improvement noted with repeat lads. Patient reports doing well since last visit. He denies CP, SOB, palpitations, orthopnea, PND, pre-syncope, LOC, swelling, or claudication. He notes snoring. Patient notes medication compliance without side effects. He does not monitorn his home BP. Patient also notes dietary noncompliance with low salt diet. He walks daily.  He reports slight improvement in GUERRERO.     5/24/23: Patient presents today for f/u appt. He was last seen on 5/5/23 for HF management and was continued on medical therapy. He has been out of his apixaban and unable to obtain due to cost. Multiple attempts to obtain financial assistance including [employer] assistance program , of which he did not qualify  2/2  household income. Discussed with patient's sister regarding changing to coumadin. Also patient's Urologist requested AC hold for scheduled lithotripsy on 6/2/23. Patient notes slight improvement in GUERRERO, he can now walk a little further Patient denies CP, SOB, palpitations, orthopnea, PND, pre-syncope, LOC, swelling, or claudication. He notes snoring. Patient notes medication compliance without side effects. He does not monitor his home BP. Patient also notes dietary noncompliance with low salt diet. He walks daily.  He reports slight improvement in GUERRERO.    7/5/23: patient presents today for f/u appt for HF and HTN management. He was last seen on 5/24/23 for HF management and was continued on medical therapy. Since visit patient received funding for Apixaban and was transition from warfarin per clinical pharmacist. BP remains elevated and recent PCP visit carvedilol was increased to 25 mg. Per Urology, procedure not completed 2/2 lack of insurance coverage and patient was referred to [facility]. Patient denies CP, SOB, palpitations, orthopnea, PND, pre-syncope, LOC, swelling, or claudication. He notes snoring. Patient notes medication compliance without side effects. He does monitor his home BP and now ranges 120s-130s/ 70-80s. Patient also notes dietary noncompliance with low salt diet. He walks daily. He reports slight improvement in GUERRERO.     Past Medical History:   Diagnosis Date    Anticoagulant long-term use     Arthritis     knee    Atrial fibrillation     Hypertension      Past Surgical History:   Procedure Laterality Date    TOTAL HIP ARTHROPLASTY Right 2013     Social History     Socioeconomic History    Marital status:    Tobacco Use    Smoking status: Never    Smokeless tobacco: Never   Substance and Sexual Activity    Alcohol use: No     Alcohol/week: 0.0 standard drinks    Sexual activity: Yes     Partners: Female     No family history on file.    Review of patient's allergies indicates:  No Known  "Allergies    Medication List with Changes/Refills   Current Medications    APIXABAN (ELIQUIS) 5 MG TAB    Take 5 mg by mouth 2 (two) times daily.    ASPIRIN (ECOTRIN) 81 MG EC TABLET    Take 1 tablet (81 mg total) by mouth once daily.    ATORVASTATIN (LIPITOR) 40 MG TABLET    Take 1 tablet (40 mg total) by mouth once daily.    CARVEDILOL (COREG) 25 MG TABLET    Take 1 tablet (25 mg total) by mouth 2 (two) times daily with meals.    DUTASTERIDE (AVODART) 0.5 MG CAPSULE    Take 1 capsule (0.5 mg total) by mouth once daily.    FUROSEMIDE (LASIX) 40 MG TABLET    Take 1 tablet (40 mg total) by mouth 2 (two) times daily.    LISINOPRIL (PRINIVIL,ZESTRIL) 40 MG TABLET    Take 1 tablet (40 mg total) by mouth once daily.    SPIRONOLACTONE (ALDACTONE) 25 MG TABLET    Take 0.5 tablets (12.5 mg total) by mouth once daily.       Review of Systems   Constitutional: Negative for diaphoresis and fever.   HENT:  Negative for congestion and hearing loss.    Eyes:  Negative for blurred vision and pain.   Cardiovascular:  Positive for leg swelling. Negative for chest pain, claudication, dyspnea on exertion, near-syncope, palpitations and syncope.   Respiratory:  Negative for shortness of breath and sleep disturbances due to breathing.    Hematologic/Lymphatic: Negative for bleeding problem. Does not bruise/bleed easily.   Skin:  Negative for color change and poor wound healing.   Gastrointestinal:  Negative for abdominal pain and nausea.   Genitourinary:  Negative for bladder incontinence and flank pain.   Neurological:  Negative for focal weakness and light-headedness.      Objective:   /75 (BP Location: Left arm)   Pulse 60   Ht 5' 10" (1.778 m)   Wt 112.4 kg (247 lb 14.4 oz)   SpO2 98%   BMI 35.57 kg/m²    Physical Exam  Constitutional:       Appearance: He is well-developed. He is not diaphoretic.   HENT:      Head: Normocephalic and atraumatic.   Eyes:      General: No scleral icterus.     Pupils: Pupils are equal, " round, and reactive to light.   Neck:      Vascular: No JVD.   Cardiovascular:      Rate and Rhythm: Regular rhythm. Bradycardia present.      Pulses: Intact distal pulses.           Radial pulses are 2+ on the right side and 2+ on the left side.        Dorsalis pedis pulses are 2+ on the right side and 2+ on the left side.        Posterior tibial pulses are 2+ on the right side and 2+ on the left side.      Heart sounds: S1 normal and S2 normal. Murmur heard.     No friction rub. No gallop.   Pulmonary:      Effort: Pulmonary effort is normal. No respiratory distress.      Breath sounds: Normal breath sounds. No wheezing or rales.   Chest:      Chest wall: No tenderness.   Abdominal:      General: Bowel sounds are normal. There is no distension.      Palpations: Abdomen is soft. There is no mass.      Tenderness: There is no abdominal tenderness. There is no rebound.   Musculoskeletal:         General: No tenderness. Normal range of motion.      Cervical back: Normal range of motion and neck supple.      Right lower le+ Edema present.      Left lower le+ Edema present.   Skin:     General: Skin is warm and dry.      Coloration: Skin is not pale.   Neurological:      Mental Status: He is alert and oriented to person, place, and time.      Coordination: Coordination normal.      Deep Tendon Reflexes: Reflexes normal.   Psychiatric:         Behavior: Behavior normal.         Judgment: Judgment normal.         EKG 23- reviewed  A-Fib w T wave abnormality, HR 70    Cardiac echo 4/3/23-reviewed   Summary    The left ventricle is mildly enlarged with marked concentric hypertrophy and moderately decreased systolic function. The estimated ejection fraction is 30-35%.  There is an increased density to the thickened LV myocardium suggestive of an infiltrative process (e.g., amyloidosis)  Normal right ventricular size with normal right ventricular systolic function.  Severe biatrial enlargement.  Trivial  pericardial effusion.  The pulmonary artery systolic pressure could not be estimated due to the lack of tricuspid insufficiency.  Normal central venous pressure (3 mmHg).  Atrial fibrillation observed    Assessment:       1. Chronic combined systolic and diastolic CHF, NYHA class 2    2. Persistent atrial fibrillation    3. Essential hypertension    4. Stage 3a chronic kidney disease    5. Long term (current) use of anticoagulants    6. MEHNAZ (obstructive sleep apnea)    7. Bladder mass    8. Kidney stone               Plan:         Chronic combined systolic and diastolic CHF, NYHA class 2  Patient is identified as having Systolic (HFrEF) heart failure that is Chronic. CHF is currently controlled. Latest ECHO performed and demonstrates- Results for orders placed during the hospital encounter of 04/01/23    Echo    Interpretation Summary  · The left ventricle is mildly enlarged with marked concentric hypertrophy and moderately decreased systolic function. The estimated ejection fraction is 30-35%.  · There is an increased density to the thickened LV myocardium suggestive of an infiltrative process (e.g., amyloidosis)  · Normal right ventricular size with normal right ventricular systolic function.  · Severe biatrial enlargement.  · Trivial pericardial effusion.  · The pulmonary artery systolic pressure could not be estimated due to the lack of tricuspid insufficiency.  · Normal central venous pressure (3 mmHg).  · Atrial fibrillation observed.  . Continue Beta Blocker, ACE/ARB and Furosemide and monitor clinical status closely. Monitor on telemetry. Patient is on CHF pathway.  Monitor strict Is&Os and daily weights.  Place on fluid restriction of 2 L. Continue to stress to patient importance of self efficacy and  on diet for CHF. Last BNP reviewed- and noted below @LABRCNTIP(BNP,BNPTRIAGEBLO)@.    - Carvedilol 25 mg   -continue  lisinopril 40 mg (will DC when  Entresto obtained),  furosemide 40 mg  BID    -spironolactone    -start Entresto 24-26 mg and titrate to GDMT   -continue titrate medications to GDMT   -Discussed lifestyle modifications- diet, exercise, weight loss  -instructed to weigh self daily, notify office if > 3 pound weight gain in 1 day or 5 pounds in 1 week     Atrial fibrillation  -SISUU9CVO- 3  -continue AC therapy-apixaban 5 mg (patient has received funding)       Essential hypertension  -Goal BP < 130/80, medication compliant  -home -130s/70-80s  -continue medication regimen: lisinopril 40 mg, carvedilol 25 mg   -continue furosemide  40 mg BID   -continue HF management  -discussed lifestyle modifications- diet, exercise, weight loss   -check BP twice daily and maintain log, bring log to all appts.     Persistent atrial fibrillation  -[id number]- 3  -continue AC therapy-apixaban 5 mg  -repeat EKG 4/14/23-reviewed A-Fib w HR 65    Stage 3a chronic kidney disease  -Baseline Cr 1.4, CKD clinically related to longstanding HTN   - Following with Urology for renal stones  - Avoid NSAIDs, increase hydration, recommend further BP control     Long term (current) use of anticoagulants  -continue Apixaban 5 mg     Obesity, Class II, BMI 35-39.9, with comorbidity  -encouraged weight loss  -discussed health risk associated w obesity     MEHNAZ (obstructive sleep apnea)  -not currently on CPAP     Bladder mass  -Followed by Urology     Kidney stone  -Followed by Urology             Total duration of face to face visit time 30 minutes.  Total time spent counseling greater than fifty percent of total visit time.  Counseling included discussion regarding imaging findings, diagnosis, possibilities, treatment options, risks and benefits.  The patient had many questions regarding the options and long-term effects      Miki Salgado,NP  Cardiology

## 2023-07-05 NOTE — ASSESSMENT & PLAN NOTE
-Goal BP < 130/80, medication compliant  -home -130s/70-80s  -continue medication regimen: lisinopril 40 mg, carvedilol 25 mg   -continue furosemide  40 mg BID   -continue HF management  -discussed lifestyle modifications- diet, exercise, weight loss   -check BP twice daily and maintain log, bring log to all appts.

## 2023-07-05 NOTE — ASSESSMENT & PLAN NOTE
Patient is identified as having Systolic (HFrEF) heart failure that is Chronic. CHF is currently controlled. Latest ECHO performed and demonstrates- Results for orders placed during the hospital encounter of 04/01/23    Echo    Interpretation Summary  · The left ventricle is mildly enlarged with marked concentric hypertrophy and moderately decreased systolic function. The estimated ejection fraction is 30-35%.  · There is an increased density to the thickened LV myocardium suggestive of an infiltrative process (e.g., amyloidosis)  · Normal right ventricular size with normal right ventricular systolic function.  · Severe biatrial enlargement.  · Trivial pericardial effusion.  · The pulmonary artery systolic pressure could not be estimated due to the lack of tricuspid insufficiency.  · Normal central venous pressure (3 mmHg).  · Atrial fibrillation observed.  . Continue Beta Blocker, ACE/ARB and Furosemide and monitor clinical status closely. Monitor on telemetry. Patient is on CHF pathway.  Monitor strict Is&Os and daily weights.  Place on fluid restriction of 2 L. Continue to stress to patient importance of self efficacy and  on diet for CHF. Last BNP reviewed- and noted below @LABRCNTIP(BNP,BNPTRIAGEBLO)@.    - Carvedilol 25 mg   -continue  lisinopril 40 mg (will DC when  Entresto obtained),  furosemide 40 mg  BID   -spironolactone    -start Entresto 24-26 mg and titrate to GDMT   -continue titrate medications to GDMT   -Discussed lifestyle modifications- diet, exercise, weight loss  -instructed to weigh self daily, notify office if > 3 pound weight gain in 1 day or 5 pounds in 1 week

## 2023-07-05 NOTE — ASSESSMENT & PLAN NOTE
-Baseline Cr 1.4, CKD clinically related to longstanding HTN   - Following with Urology for renal stones  - Avoid NSAIDs, increase hydration, recommend further BP control

## 2023-07-06 ENCOUNTER — PATIENT MESSAGE (OUTPATIENT)
Dept: CARDIOLOGY | Facility: CLINIC | Age: 68
End: 2023-07-06

## 2023-07-06 ENCOUNTER — TELEPHONE (OUTPATIENT)
Dept: CARDIOLOGY | Facility: CLINIC | Age: 68
End: 2023-07-06

## 2023-07-06 DIAGNOSIS — I50.42 CHRONIC COMBINED SYSTOLIC AND DIASTOLIC CHF, NYHA CLASS 2: Primary | ICD-10-CM

## 2023-07-06 RX ORDER — SACUBITRIL AND VALSARTAN 24; 26 MG/1; MG/1
1 TABLET, FILM COATED ORAL 2 TIMES DAILY
Qty: 60 TABLET | Refills: 6 | Status: SHIPPED | OUTPATIENT
Start: 2023-07-06 | End: 2023-07-21 | Stop reason: SDUPTHER

## 2023-07-06 NOTE — TELEPHONE ENCOUNTER
----- Message from Ana Paula Barroso sent at 7/5/2023  5:12 PM CDT -----  .Type:  Needs Medical Advice    Who Called: Melissa with [employer]    Would the patient rather a call back or a response via [facility]? Call back  Best Call Back Number: [phone]   Additional Information:     Melissa stated she is missing proof of income and need a new prescription   Fax# [phone]

## 2023-07-07 ENCOUNTER — TELEPHONE (OUTPATIENT)
Dept: CARDIOLOGY | Facility: CLINIC | Age: 68
End: 2023-07-07

## 2023-07-10 PROBLEM — G45.9 TIA (TRANSIENT ISCHEMIC ATTACK): Status: RESOLVED | Noted: 2023-04-05 | Resolved: 2023-07-10

## 2023-07-10 PROBLEM — N17.9 AKI (ACUTE KIDNEY INJURY): Status: RESOLVED | Noted: 2023-04-05 | Resolved: 2023-07-10

## 2023-07-11 ENCOUNTER — OFFICE VISIT (OUTPATIENT)
Dept: FAMILY MEDICINE | Facility: CLINIC | Age: 68
End: 2023-07-11

## 2023-07-11 VITALS
BODY MASS INDEX: 39.24 KG/M2 | HEIGHT: 70 IN | DIASTOLIC BLOOD PRESSURE: 88 MMHG | OXYGEN SATURATION: 99 % | SYSTOLIC BLOOD PRESSURE: 132 MMHG | WEIGHT: 274.13 LBS | HEART RATE: 69 BPM

## 2023-07-11 DIAGNOSIS — I10 ESSENTIAL HYPERTENSION: Primary | ICD-10-CM

## 2023-07-11 DIAGNOSIS — N32.89 BLADDER MASS: ICD-10-CM

## 2023-07-11 DIAGNOSIS — N18.31 STAGE 3A CHRONIC KIDNEY DISEASE: ICD-10-CM

## 2023-07-11 DIAGNOSIS — N13.8 BPH WITH URINARY OBSTRUCTION: ICD-10-CM

## 2023-07-11 DIAGNOSIS — N40.1 BPH WITH URINARY OBSTRUCTION: ICD-10-CM

## 2023-07-11 DIAGNOSIS — R73.03 PREDIABETES: ICD-10-CM

## 2023-07-11 DIAGNOSIS — I50.42 CHRONIC COMBINED SYSTOLIC AND DIASTOLIC CHF, NYHA CLASS 2: ICD-10-CM

## 2023-07-11 PROCEDURE — 99214 PR OFFICE/OUTPT VISIT, EST, LEVL IV, 30-39 MIN: ICD-10-PCS | Mod: S$PBB,,, | Performed by: STUDENT IN AN ORGANIZED HEALTH CARE EDUCATION/TRAINING PROGRAM

## 2023-07-11 PROCEDURE — 99213 OFFICE O/P EST LOW 20 MIN: CPT | Mod: PBBFAC,PN | Performed by: STUDENT IN AN ORGANIZED HEALTH CARE EDUCATION/TRAINING PROGRAM

## 2023-07-11 PROCEDURE — 99999 PR PBB SHADOW E&M-EST. PATIENT-LVL III: ICD-10-PCS | Mod: PBBFAC,,, | Performed by: STUDENT IN AN ORGANIZED HEALTH CARE EDUCATION/TRAINING PROGRAM

## 2023-07-11 PROCEDURE — 99214 OFFICE O/P EST MOD 30 MIN: CPT | Mod: S$PBB,,, | Performed by: STUDENT IN AN ORGANIZED HEALTH CARE EDUCATION/TRAINING PROGRAM

## 2023-07-11 PROCEDURE — 99999 PR PBB SHADOW E&M-EST. PATIENT-LVL III: CPT | Mod: PBBFAC,,, | Performed by: STUDENT IN AN ORGANIZED HEALTH CARE EDUCATION/TRAINING PROGRAM

## 2023-07-11 NOTE — PROGRESS NOTES
Subjective:     66 yo M with prediabetes, microcytosis, CKD stage 3a, sHTN, A-fib, HFrEF ( LVEF 30-35% in 04/2023), MEHNAZ, obesity, prior TIA and MYLES on CKD with recent incidental finding of ?bladder mass  who presents for f/u on BP mgt and heart failure symptoms . He endorses no new complaints today , still with exertional dyspnea, pending entresto authorization hence still on lisinopril and has been able to switch warfarin to eliquis. He has been compliant with all his medications. He also described his mood to be great today.    Past Medical History:   Diagnosis Date    Anticoagulant long-term use     Arthritis     knee    Atrial fibrillation     Hypertension      Past Surgical History:   Procedure Laterality Date    TOTAL HIP ARTHROPLASTY Right 2013     Social History     Socioeconomic History    Marital status:    Tobacco Use    Smoking status: Never    Smokeless tobacco: Never   Substance and Sexual Activity    Alcohol use: No     Alcohol/week: 0.0 standard drinks    Sexual activity: Yes     Partners: Female     History reviewed. No pertinent family history.    Review of patient's allergies indicates:  No Known Allergies    Medication List with Changes/Refills   Current Medications    APIXABAN (ELIQUIS) 5 MG TAB    Take 5 mg by mouth 2 (two) times daily.    ASPIRIN (ECOTRIN) 81 MG EC TABLET    Take 1 tablet (81 mg total) by mouth once daily.    ATORVASTATIN (LIPITOR) 40 MG TABLET    Take 1 tablet (40 mg total) by mouth once daily.    CARVEDILOL (COREG) 25 MG TABLET    Take 1 tablet (25 mg total) by mouth 2 (two) times daily with meals.    DUTASTERIDE (AVODART) 0.5 MG CAPSULE    Take 1 capsule (0.5 mg total) by mouth once daily.    FUROSEMIDE (LASIX) 40 MG TABLET    Take 1 tablet (40 mg total) by mouth 2 (two) times daily.    LISINOPRIL (PRINIVIL,ZESTRIL) 40 MG TABLET    Take 1 tablet (40 mg total) by mouth once daily.    SACUBITRIL-VALSARTAN (ENTRESTO) 24-26 MG PER TABLET    Take 1 tablet by mouth 2  "(two) times daily.    SPIRONOLACTONE (ALDACTONE) 25 MG TABLET    Take 0.5 tablets (12.5 mg total) by mouth once daily.       Review of Systems   Constitutional: Negative for diaphoresis and fever.   HENT:  Negative for congestion and hearing loss.    Eyes:  Negative for blurred vision and pain.   Cardiovascular:  Positive for leg swelling. Negative for chest pain, claudication, dyspnea on exertion, near-syncope, palpitations and syncope.   Respiratory:  Negative for shortness of breath and sleep disturbances due to breathing.    Hematologic/Lymphatic: Negative for bleeding problem. Does not bruise/bleed easily.   Skin:  Negative for color change and poor wound healing.   Gastrointestinal:  Negative for abdominal pain and nausea.   Genitourinary:  Negative for bladder incontinence and flank pain.   Neurological:  Negative for focal weakness and light-headedness.      Objective:   /88 (BP Location: Left arm, Patient Position: Sitting)   Pulse 69   Ht 5' 10" (1.778 m)   Wt 124.3 kg (274 lb 1.6 oz)   SpO2 99%   BMI 39.33 kg/m²    Physical Exam  Vitals reviewed.   Constitutional:       General: He is not in acute distress.     Appearance: He is well-developed. He is not diaphoretic.   HENT:      Head: Normocephalic and atraumatic.      Mouth/Throat:      Mouth: Mucous membranes are moist.      Pharynx: Oropharynx is clear.   Eyes:      General: No scleral icterus.     Pupils: Pupils are equal, round, and reactive to light.   Neck:      Vascular: No JVD.   Cardiovascular:      Rate and Rhythm: Bradycardia present. Rhythm irregular.      Pulses: Intact distal pulses.           Radial pulses are 2+ on the right side and 2+ on the left side.        Dorsalis pedis pulses are 2+ on the right side and 2+ on the left side.        Posterior tibial pulses are 2+ on the right side and 2+ on the left side.      Heart sounds: S1 normal and S2 normal. No murmur heard.    No friction rub. No gallop.   Pulmonary:      Effort: " Pulmonary effort is normal. No respiratory distress.      Breath sounds: Normal breath sounds. No wheezing or rales.   Chest:      Chest wall: No tenderness.   Abdominal:      General: Bowel sounds are normal. There is no distension.      Palpations: Abdomen is soft. There is no mass.      Tenderness: There is no abdominal tenderness. There is no rebound.   Musculoskeletal:         General: No tenderness. Normal range of motion.      Cervical back: Normal range of motion and neck supple.      Right lower le+ Edema ((1+ bilaterally)) present.      Left lower le+ Edema present.   Skin:     General: Skin is warm and dry.      Coloration: Skin is not pale.   Neurological:      Mental Status: He is alert and oriented to person, place, and time.      Coordination: Coordination normal.      Deep Tendon Reflexes: Reflexes normal.   Psychiatric:         Behavior: Behavior normal.         Judgment: Judgment normal.         EKG 23- reviewed  A-Fib w T wave abnormality, HR 70    Cardiac echo 4/3/23-reviewed   Summary    The left ventricle is mildly enlarged with marked concentric hypertrophy and moderately decreased systolic function. The estimated ejection fraction is 30-35%.  There is an increased density to the thickened LV myocardium suggestive of an infiltrative process (e.g., amyloidosis)  Normal right ventricular size with normal right ventricular systolic function.  Severe biatrial enlargement.  Trivial pericardial effusion.  The pulmonary artery systolic pressure could not be estimated due to the lack of tricuspid insufficiency.  Normal central venous pressure (3 mmHg).  Atrial fibrillation observed    Assessment:       1. Essential hypertension    2. Chronic combined systolic and diastolic CHF, NYHA class 2    3. Prediabetes    4. Stage 3a chronic kidney disease    5. BPH with urinary obstruction    6. Bladder mass               Plan:     -BP controlled today, not at goal (goal is <130/88)  -compensated on  examination  -could not afford entresto, c/w lisinopril 40mg daily   -c/w other regimen as prescribed  -counseled on life style modifications (low salt diet <2g, <2L fluid intake)  -c/w daily exercise (30 minutes-45 minutes daily), healthy eating habit encouraged  -medication reconciliation done with patient and patient verbalized understanding  -[employer] license form filled for patient    Dr Eli Snow MD  Internal Medicine

## 2023-07-21 ENCOUNTER — PATIENT MESSAGE (OUTPATIENT)
Dept: CARDIOLOGY | Facility: CLINIC | Age: 68
End: 2023-07-21

## 2023-07-21 DIAGNOSIS — I50.42 CHRONIC COMBINED SYSTOLIC AND DIASTOLIC CHF, NYHA CLASS 2: Primary | ICD-10-CM

## 2023-07-21 RX ORDER — SACUBITRIL AND VALSARTAN 24; 26 MG/1; MG/1
1 TABLET, FILM COATED ORAL 2 TIMES DAILY
Qty: 60 TABLET | Refills: 6 | Status: SHIPPED | OUTPATIENT
Start: 2023-07-21 | End: 2023-08-02 | Stop reason: SDUPTHER

## 2023-07-26 ENCOUNTER — PATIENT MESSAGE (OUTPATIENT)
Dept: CARDIOLOGY | Facility: CLINIC | Age: 68
End: 2023-07-26

## 2023-07-30 NOTE — TELEPHONE ENCOUNTER
Called placed to patient's sister Juhi Walker, who handles patient's medical care. Informed her that patient dis not qualify for [employer] assistance 2/2 house hold income. Informed that patient would need to be switched to warfarin for AC therapy. Also discussed lab studies to monitor INR, and coumadin dietary changes. Informed that Pharmacy would monitor dosing. Patient daughter stated she received some financial assistance to obtain this months medication, but would have to re-assess next month. Also instructed Ms. Walker to contact Dr. Soliz, Urology as he request medication hold for urology procedure this week. All options discussed. Instructed to contact office after speaking with Urologist. Ms. Walker verbalized understanding.         Miki Salgado NP  cardiology  
30-Jul-2023 21:45

## 2023-08-02 ENCOUNTER — PATIENT MESSAGE (OUTPATIENT)
Dept: FAMILY MEDICINE | Facility: CLINIC | Age: 68
End: 2023-08-02

## 2023-08-02 DIAGNOSIS — I50.42 CHRONIC COMBINED SYSTOLIC AND DIASTOLIC CHF, NYHA CLASS 2: ICD-10-CM

## 2023-08-02 DIAGNOSIS — I50.42 CHRONIC COMBINED SYSTOLIC AND DIASTOLIC CHF, NYHA CLASS 2: Primary | ICD-10-CM

## 2023-08-02 RX ORDER — SACUBITRIL AND VALSARTAN 24; 26 MG/1; MG/1
1 TABLET, FILM COATED ORAL 2 TIMES DAILY
Qty: 60 TABLET | Refills: 6 | Status: CANCELLED | OUTPATIENT
Start: 2023-08-02

## 2023-08-02 RX ORDER — SACUBITRIL AND VALSARTAN 24; 26 MG/1; MG/1
1 TABLET, FILM COATED ORAL 2 TIMES DAILY
Qty: 60 TABLET | Refills: 6 | Status: SHIPPED | OUTPATIENT
Start: 2023-08-02 | End: 2023-10-23 | Stop reason: SDUPTHER

## 2023-08-09 ENCOUNTER — PATIENT MESSAGE (OUTPATIENT)
Dept: CARDIOLOGY | Facility: CLINIC | Age: 68
End: 2023-08-09

## 2023-08-11 ENCOUNTER — TELEPHONE (OUTPATIENT)
Dept: NEPHROLOGY | Facility: CLINIC | Age: 68
End: 2023-08-11

## 2023-08-15 ENCOUNTER — OFFICE VISIT (OUTPATIENT)
Dept: NEPHROLOGY | Facility: CLINIC | Age: 68
End: 2023-08-15

## 2023-08-15 VITALS
SYSTOLIC BLOOD PRESSURE: 112 MMHG | OXYGEN SATURATION: 96 % | DIASTOLIC BLOOD PRESSURE: 80 MMHG | WEIGHT: 240.31 LBS | BODY MASS INDEX: 34.48 KG/M2 | HEART RATE: 94 BPM

## 2023-08-15 DIAGNOSIS — N18.31 STAGE 3A CHRONIC KIDNEY DISEASE: Primary | ICD-10-CM

## 2023-08-15 DIAGNOSIS — N20.0 NEPHROLITHIASIS: ICD-10-CM

## 2023-08-15 DIAGNOSIS — N18.30 BENIGN HYPERTENSION WITH CKD (CHRONIC KIDNEY DISEASE) STAGE III: ICD-10-CM

## 2023-08-15 DIAGNOSIS — I12.9 BENIGN HYPERTENSION WITH CKD (CHRONIC KIDNEY DISEASE) STAGE III: ICD-10-CM

## 2023-08-15 DIAGNOSIS — R80.9 PROTEINURIA, UNSPECIFIED TYPE: ICD-10-CM

## 2023-08-15 PROCEDURE — 99999 PR PBB SHADOW E&M-EST. PATIENT-LVL III: ICD-10-PCS | Mod: PBBFAC,,, | Performed by: NURSE PRACTITIONER

## 2023-08-15 PROCEDURE — 99213 OFFICE O/P EST LOW 20 MIN: CPT | Mod: PBBFAC | Performed by: NURSE PRACTITIONER

## 2023-08-15 PROCEDURE — 99214 OFFICE O/P EST MOD 30 MIN: CPT | Mod: S$PBB,,, | Performed by: NURSE PRACTITIONER

## 2023-08-15 PROCEDURE — 99999 PR PBB SHADOW E&M-EST. PATIENT-LVL III: CPT | Mod: PBBFAC,,, | Performed by: NURSE PRACTITIONER

## 2023-08-15 PROCEDURE — 99214 PR OFFICE/OUTPT VISIT, EST, LEVL IV, 30-39 MIN: ICD-10-PCS | Mod: S$PBB,,, | Performed by: NURSE PRACTITIONER

## 2023-08-15 NOTE — PROGRESS NOTES
Subjective:       Patient ID: Juvenal Bernal is a 68 y.o. black or  [place]osal[place] male who presents for evaluation of CKD 3a.    HPI     Juvenal Bernal is a 67 year old male with HTN, nephroliothiasis, ED, Afib/ A flutter, MEHNAZ, h/o TIA that presents for new evaluation of CKD 3a. Previously followed by Dr. Jama with urology for calcium kidney stone s/p lithotripsy. Recently seen by Dr. Soliz with urology with cystoscopy on 4/11/23 showing large left renal stone. Reported plans for repeat lithotripsy. He reports longstanding HTN. Cardiology following antihypertensives with recent increase of lasix to 40mg BID. He is s/p admission for TIA and MYLES in 04/2023. Received contrast with CT done on 4/1/23. Peak sCr at this time 2.1 on 4/5/23 from baseline of ~1.4. He drinks about 1L of water per day. He reports his sister has kidney disease related to diabetes. The patient denies prior nephrologist, taking NSAIDs, herbal supplements, or new antibiotics, recreational drugs, recent episode of dehydration, diarrhea, nausea or vomiting, acute illness.     Significant family hx includes: sister- CKD 2/2 DM    Update 8/15/23:  - Presents with wife for follow-up of CKD  - Planning Cystolithotripsy, TURP pending cardiology clearance  - Reports no recent issues with stones. Being treated for UTI with augmentin.  - No other complaints at this time.     Review of Systems   Respiratory:  Negative for shortness of breath.    Cardiovascular:  Negative for chest pain and leg swelling.   Gastrointestinal:  Negative for diarrhea, nausea and vomiting.   Genitourinary:  Negative for difficulty urinating, dysuria and hematuria.   Neurological:  Negative for syncope.   All other systems reviewed and are negative.      Objective:       Blood pressure 112/80, pulse 94, weight 109 kg (240 lb 4.8 oz), SpO2 96 %.  Physical Exam  Vitals reviewed.   Constitutional:       General: He is not in acute distress.  HENT:      Head: Normocephalic and  atraumatic.   Eyes:      General: No scleral icterus.  Cardiovascular:      Rate and Rhythm: Normal rate. Rhythm irregular.   Pulmonary:      Effort: Pulmonary effort is normal. No respiratory distress.      Breath sounds: No wheezing or rales.   Musculoskeletal:      Right lower leg: No edema.      Left lower leg: No edema.   Skin:     General: Skin is warm and dry.   Neurological:      Mental Status: He is alert.      Comments: Alert, speech clear, answers questions appropriately    Psychiatric:         Mood and Affect: Mood normal.         Behavior: Behavior normal.         Lab Results   Component Value Date    CREATININE 1.59 (H) 08/08/2023     Prot/Creat Ratio, Urine   Date Value Ref Range Status   08/08/2023 0.48 (H) 0.00 - 0.20 Final   05/16/2023 0.05 0.00 - 0.20 Final   04/01/2023 0.34 (H) 0.00 - 0.20 Final     Lab Results   Component Value Date     08/08/2023    K 3.9 08/08/2023    CO2 28 08/08/2023     08/08/2023     Lab Results   Component Value Date    PTH 64.8 08/08/2023    CALCIUM 9.3 08/08/2023    PHOS 3.0 08/08/2023     Lab Results   Component Value Date    HGB 13.9 (L) 08/08/2023    WBC 14.07 (H) 08/08/2023    HCT 42.8 08/08/2023      Lab Results   Component Value Date    HGBA1C 5.7 (H) 04/03/2023     08/08/2023    BUN 20 08/08/2023     Lab Results   Component Value Date    LDLCALC 124.4 04/02/2023     CT Abdomen 4/1/23  FINDINGS:  Pulmonary arterial system: This is a good quality examination for the evaluation of pulmonary thromboembolism with good opacification of the pulmonary arteries to the level of the segmental branches of the pulmonary arterial system. There is no evidence of acute pulmonary thromboembolism. No large saddle pulmonary embolism, enlargement of the main pulmonary artery, or secondary findings of right ventricular strain.  Aorta and heart: The heart is enlarged in size.  Multi chamber with calcifications in the mitral valve annulus.  Is no pericardial fluid.   No thoracic aortic aneurysm.  No thoracic aortic dissection.  Airways: Unremarkable.  Lymph nodes: No pathologically enlarged lymph nodes in the chest or axilla.  Lungs: Scattered ground-glass opacities with no nodular consistency.  No lobar consolidation.  No dominant nodule.  Otherwise, lungs are clear with no evidence of airspace consolidation, mass, or bronchiectasis.  No emphysematous lung architecture.  Pleura: No significant volume of pleural fluid or pneumothorax.  No pleural based masses.  Abdomen and pelvis: The liver, spleen, pancreas, adrenal glands and kidneys appear unremarkable, allowing for bilateral low-density lesions in the kidneys compatible with cortical cysts.  The abdominal aorta and vena cava are normal in caliber with minimal atherosclerotic disease in the aorta and iliac system.  The loops of large and small intestines appear unremarkable.  There is no free air or inflammation.  The urinary bladder contains a 2.25 cm spiculated calcified K shin/calcified lesion in the left side of the trigone with thickening along the posterior trigone.  The prostate gland is mildly enlarged with median lobe prominence but no invagination into the urinary bladder.  No pelvic mass or free fluid is evident.  Degenerative changes with sclerotic bone island again noted in the iliac wing left.  Lumbar stenosis at multiple levels due to a short pedicles and hypertrophic posterior element changes.  Total hip replacement on the right.     Impression:     1. No evidence of acute pulmonary thromboembolism.  2. Scattered ground-glass opacities in the lungs.  Correlate for early pulmonary edema or pneumonitis.  3. No lobar consolidation or effusion.  4. Cardiac chamber enlargement.  5. Calcified spiculated lesion in the urinary bladder toward the left with thickened trigone.  While this could represent a bladder calculus, calcified bladder mass with bladder wall thickening is difficult to exclude.  Urology follow-up  and interrogation is urged if not already performed.  6.  This report was flagged in Epic as abnormal.      Assessment:       1. Stage 3a chronic kidney disease    2. Proteinuria, unspecified type    3. Benign hypertension with CKD (chronic kidney disease) stage III    4. Nephrolithiasis          Plan:   CKD stage 3a c eGFR 47.6 mL/min -  - Baseline Cr 1.4, CKD clinically related to longstanding HTN. sCr now 1.59, overall stable.   - Following with Urology for renal stones, management as below  - Avoid NSAIDs, increase hydration, recommend further BP control     UPCR 50mg--> 480mg/g; continue RAASi. Monitor UPCR.    Acid-base WNL   Renal osteodystrophy PTH, Ca, and phos stable    Anemia WNL   DM No history   Lipid Management On statin   ESRD planning Provided education about the stages of CKD, usual progression, and need to monitor for and treat CKD-related disease in an effort to delay progression of CKD.    Stable CKD II-IIIa      HTN   - At goal  - coreg 25 BID, lasix 40, entresto per cardiology   - low Na diet     Nephrolithiasis  - Following with Urology with plans for lithotripsy  - Stone analysis in 2017 showed 100% CaOx stones   - Plan for 24 hr urine supersaturation before next visit  - Increase H2O intake for UOP >2L per day, low Na diet, reduce animal protein  - Potential for dietician referral for kidney stones in future as well, defer today      All questions patient had were answered.  Asked if further questions. None. F/u in clinic 3 months  with labs and urine prior to next visit or sooner if needed.  ER for emergency concerns.    Summary of Plan:  - Continue with OR per Urology  - F/u with standard labs and urine, 24 hr urine supersaturation

## 2023-08-16 ENCOUNTER — PATIENT MESSAGE (OUTPATIENT)
Dept: CARDIOLOGY | Facility: CLINIC | Age: 68
End: 2023-08-16

## 2023-08-22 ENCOUNTER — PATIENT MESSAGE (OUTPATIENT)
Dept: NEPHROLOGY | Facility: CLINIC | Age: 68
End: 2023-08-22

## 2023-08-23 DIAGNOSIS — N20.0 NEPHROLITHIASIS: Primary | ICD-10-CM

## 2023-08-28 PROBLEM — Z00.00 ENCOUNTER FOR MEDICAL EXAMINATION TO ESTABLISH CARE: Status: RESOLVED | Noted: 2023-05-24 | Resolved: 2023-08-28

## 2023-10-03 ENCOUNTER — TELEPHONE (OUTPATIENT)
Dept: CARDIOLOGY | Facility: CLINIC | Age: 68
End: 2023-10-03

## 2023-10-03 NOTE — TELEPHONE ENCOUNTER
----- Message from Miki Salgado NP sent at 10/3/2023  8:06 AM CDT -----  Please inform Mr. Bernal that his heart function remains decreased but has improved from prior study. Continue all medication has prescribed.    Thank you,  Miki Salgado NP

## 2023-10-06 ENCOUNTER — OFFICE VISIT (OUTPATIENT)
Dept: CARDIOLOGY | Facility: CLINIC | Age: 68
End: 2023-10-06

## 2023-10-06 VITALS
SYSTOLIC BLOOD PRESSURE: 112 MMHG | HEART RATE: 53 BPM | WEIGHT: 249 LBS | OXYGEN SATURATION: 98 % | HEIGHT: 70 IN | DIASTOLIC BLOOD PRESSURE: 74 MMHG | BODY MASS INDEX: 35.65 KG/M2

## 2023-10-06 DIAGNOSIS — N18.31 STAGE 3A CHRONIC KIDNEY DISEASE: ICD-10-CM

## 2023-10-06 DIAGNOSIS — N20.0 KIDNEY STONE: ICD-10-CM

## 2023-10-06 DIAGNOSIS — I10 ESSENTIAL HYPERTENSION: ICD-10-CM

## 2023-10-06 DIAGNOSIS — I50.42 CHRONIC COMBINED SYSTOLIC AND DIASTOLIC CHF, NYHA CLASS 2: Primary | ICD-10-CM

## 2023-10-06 DIAGNOSIS — E66.01 SEVERE OBESITY (BMI 35.0-39.9) WITH COMORBIDITY: ICD-10-CM

## 2023-10-06 DIAGNOSIS — Z79.01 LONG TERM (CURRENT) USE OF ANTICOAGULANTS: ICD-10-CM

## 2023-10-06 DIAGNOSIS — I48.19 PERSISTENT ATRIAL FIBRILLATION: ICD-10-CM

## 2023-10-06 DIAGNOSIS — R73.03 PREDIABETES: ICD-10-CM

## 2023-10-06 DIAGNOSIS — G47.33 OSA (OBSTRUCTIVE SLEEP APNEA): ICD-10-CM

## 2023-10-06 PROCEDURE — 99213 OFFICE O/P EST LOW 20 MIN: CPT | Mod: PBBFAC,PN

## 2023-10-06 PROCEDURE — 99214 PR OFFICE/OUTPT VISIT, EST, LEVL IV, 30-39 MIN: ICD-10-PCS | Mod: S$PBB,,,

## 2023-10-06 PROCEDURE — 99214 OFFICE O/P EST MOD 30 MIN: CPT | Mod: S$PBB,,,

## 2023-10-06 PROCEDURE — 99999 PR PBB SHADOW E&M-EST. PATIENT-LVL III: CPT | Mod: PBBFAC,,,

## 2023-10-06 PROCEDURE — 99999 PR PBB SHADOW E&M-EST. PATIENT-LVL III: ICD-10-PCS | Mod: PBBFAC,,,

## 2023-10-06 RX ORDER — SACUBITRIL AND VALSARTAN 49; 51 MG/1; MG/1
1 TABLET, FILM COATED ORAL 2 TIMES DAILY
Qty: 60 TABLET | Refills: 6 | Status: SHIPPED | OUTPATIENT
Start: 2023-10-06 | End: 2023-10-23 | Stop reason: SDUPTHER

## 2023-10-06 NOTE — ASSESSMENT & PLAN NOTE
-Baseline Cr 1.4, CKD clinically related to longstanding HTN   -followed by Nephrology   - Following with Urology for renal stones  - Avoid NSAIDs, increase hydration, recommend further BP control

## 2023-10-06 NOTE — PROGRESS NOTES
Subjective:    Patient ID:  Juvenal Bernal is a 68 y.o. male who presents for follow-up of 3 month F/U ( 3 mothFollow up ) and Follow-up (3 month f/u)      PCP: Eli Snow MD     Referring Provider:     HPI: Patient is a 66 yo M w/H of HTN, A-fib, HFrEF ( LVEF 30-35%), MEHNAZ, obesity  who presents today to establish care and post hospital admission. Recent admission 4/5/23-4/6/23 w c/o blurred vision and speech difficulty 2/2 MYLES as a result of decreased hydration and diuretics for HF management. Symptoms improved with hydration. CT head negative, Carotid doppler showed no significant stenosis. Coreg for HF management decreased 2/2 bradycardia. Discharged home on medical therapy w Cardiology f/u. Incidental finding bladder mass on Ct scan and is currently following with Urology. He denies CP, SOB, palpitations, orthopnea, PND, pre-syncope, LOC, swelling, or claudication. He notes snoring. Patient noted medication compliance since discharge. He does not monitorn his home BP. Patient also notes dietary noncompliance with low salt diet. He walks daily.     5/5/23: Patient presents today for f/u appt. He was last seen on 4/14/23 post admission and HF management and was continued on medical therapy. Entresto on hold 2/2 MYLES, improvement noted with repeat lads. Patient reports doing well since last visit. He denies CP, SOB, palpitations, orthopnea, PND, pre-syncope, LOC, swelling, or claudication. He notes snoring. Patient notes medication compliance without side effects. He does not monitorn his home BP. Patient also notes dietary noncompliance with low salt diet. He walks daily.  He reports slight improvement in GUERRERO.     5/24/23: Patient presents today for f/u appt. He was last seen on 5/5/23 for HF management and was continued on medical therapy. He has been out of his apixaban and unable to obtain due to cost. Multiple attempts to obtain financial assistance including [employer] assistance program ,  of which he did not qualify  2/2 household income. Discussed with patient's sister regarding changing to coumadin. Also patient's Urologist requested AC hold for scheduled lithotripsy on 6/2/23. Patient notes slight improvement in GUERRERO, he can now walk a little further Patient denies CP, SOB, palpitations, orthopnea, PND, pre-syncope, LOC, swelling, or claudication. He notes snoring. Patient notes medication compliance without side effects. He does not monitor his home BP. Patient also notes dietary noncompliance with low salt diet. He walks daily.  He reports slight improvement in GUERRERO.    7/5/23: patient presents today for f/u appt for HF and HTN management. He was last seen on 5/24/23 for HF management and was continued on medical therapy. Since visit patient received funding for Apixaban and was transition from warfarin per clinical pharmacist. BP remains elevated and recent PCP visit carvedilol was increased to 25 mg. Per Urology, procedure not completed 2/2 lack of insurance coverage and patient was referred to [facility]. Patient denies CP, SOB, palpitations, orthopnea, PND, pre-syncope, LOC, swelling, or claudication. He notes snoring. Patient notes medication compliance without side effects. He does monitor his home BP and now ranges 120s-130s/ 70-80s. Patient also notes dietary noncompliance with low salt diet. He walks daily. He reports slight improvement in GUERRERO.     10/6/23: Patient presents today for f/u appt of HF and HTN management. He was last seen on 7/5/23 for f/u and was continued on medical therapy. Since last appt he has received funding and was switched from Lisinopril to Entresto for HF management. Patient denies CP, SOB, palpitations, orthopnea, PND, pre-syncope, LOC, swelling, or claudication. He notes snoring. Patient notes medication compliance without side effects. He does monitor his home BP and now ranges 110s-130s/ 70-80s. Patient also notes dietary noncompliance with low salt diet. He walks  daily. He reports slight improvement in GUERRERO.        Past Medical History:   Diagnosis Date    Anticoagulant long-term use     Arthritis     knee    Atrial fibrillation     Hypertension      Past Surgical History:   Procedure Laterality Date    TOTAL HIP ARTHROPLASTY Right 2013     Social History     Socioeconomic History    Marital status:    Tobacco Use    Smoking status: Never    Smokeless tobacco: Never   Substance and Sexual Activity    Alcohol use: No     Alcohol/week: 0.0 standard drinks of alcohol    Sexual activity: Yes     Partners: Female     No family history on file.    Review of patient's allergies indicates:  No Known Allergies    Medication List with Changes/Refills   New Medications    SACUBITRIL-VALSARTAN (ENTRESTO) 49-51 MG PER TABLET    Take 1 tablet by mouth 2 (two) times daily.   Current Medications    APIXABAN (ELIQUIS) 5 MG TAB    Take 5 mg by mouth 2 (two) times daily.    ASPIRIN (ECOTRIN) 81 MG EC TABLET    Take 1 tablet (81 mg total) by mouth once daily.    ATORVASTATIN (LIPITOR) 40 MG TABLET    Take 1 tablet (40 mg total) by mouth once daily.    CARVEDILOL (COREG) 25 MG TABLET    Take 1 tablet (25 mg total) by mouth 2 (two) times daily with meals.    DUTASTERIDE (AVODART) 0.5 MG CAPSULE    Take 1 capsule (0.5 mg total) by mouth once daily.    FUROSEMIDE (LASIX) 40 MG TABLET    Take 1 tablet (40 mg total) by mouth 2 (two) times daily.    SACUBITRIL-VALSARTAN (ENTRESTO) 24-26 MG PER TABLET    Take 1 tablet by mouth 2 (two) times daily.    SPIRONOLACTONE (ALDACTONE) 25 MG TABLET    Take 0.5 tablets (12.5 mg total) by mouth once daily.   Discontinued Medications    LISINOPRIL (PRINIVIL,ZESTRIL) 40 MG TABLET    Take 1 tablet (40 mg total) by mouth once daily.       Review of Systems   Constitutional: Negative for diaphoresis and fever.   HENT:  Negative for congestion and hearing loss.    Eyes:  Negative for blurred vision and pain.   Cardiovascular:  Positive for leg swelling. Negative  "for chest pain, claudication, dyspnea on exertion, near-syncope, palpitations and syncope.   Respiratory:  Negative for shortness of breath and sleep disturbances due to breathing.    Hematologic/Lymphatic: Negative for bleeding problem. Does not bruise/bleed easily.   Skin:  Negative for color change and poor wound healing.   Gastrointestinal:  Negative for abdominal pain and nausea.   Genitourinary:  Negative for bladder incontinence and flank pain.   Neurological:  Negative for focal weakness and light-headedness.        Objective:   /74 (BP Location: Left arm, Patient Position: Sitting, BP Method: Large (Automatic))   Pulse (!) 53   Ht 5' 10" (1.778 m)   Wt 112.9 kg (249 lb)   SpO2 98%   BMI 35.73 kg/m²    Physical Exam  Constitutional:       Appearance: He is well-developed. He is not diaphoretic.   HENT:      Head: Normocephalic and atraumatic.   Eyes:      General: No scleral icterus.     Pupils: Pupils are equal, round, and reactive to light.   Neck:      Vascular: No JVD.   Cardiovascular:      Rate and Rhythm: Regular rhythm. Bradycardia present.      Pulses: Intact distal pulses.           Radial pulses are 2+ on the right side and 2+ on the left side.        Dorsalis pedis pulses are 2+ on the right side and 2+ on the left side.        Posterior tibial pulses are 2+ on the right side and 2+ on the left side.      Heart sounds: S1 normal and S2 normal. Murmur heard.      No friction rub. No gallop.   Pulmonary:      Effort: Pulmonary effort is normal. No respiratory distress.      Breath sounds: Normal breath sounds. No wheezing or rales.   Chest:      Chest wall: No tenderness.   Abdominal:      General: Bowel sounds are normal. There is no distension.      Palpations: Abdomen is soft. There is no mass.      Tenderness: There is no abdominal tenderness. There is no rebound.   Musculoskeletal:         General: No tenderness. Normal range of motion.      Cervical back: Normal range of motion and " neck supple.      Right lower le+ Edema present.      Left lower le+ Edema present.   Skin:     General: Skin is warm and dry.      Coloration: Skin is not pale.   Neurological:      Mental Status: He is alert and oriented to person, place, and time.      Coordination: Coordination normal.      Deep Tendon Reflexes: Reflexes normal.   Psychiatric:         Behavior: Behavior normal.         Judgment: Judgment normal.             Cardiac echo 10/2/23  Summary         Left Ventricle: The left ventricle is normal in size. There is concentric remodeling. Normal wall motion. There is mildly reduced systolic function. Ejection fraction by visual approximation is 45%. Unable to assess diastolic function due to atrial fibrillation.    Left Atrium: Left atrium is severely dilated.    Right Ventricle: Systolic function is normal.    Right Atrium: Right atrium is moderately dilated.    Tricuspid Valve: There is mild regurgitation.    Pulmonary Artery: The estimated pulmonary artery systolic pressure is 57 mmHg.    IVC/SVC: Normal venous pressure at 3 mmHg.  EKG 23- reviewed  A-Fib w T wave abnormality, HR 70    Cardiac echo 4/3/23-reviewed   Summary    The left ventricle is mildly enlarged with marked concentric hypertrophy and moderately decreased systolic function. The estimated ejection fraction is 30-35%.  There is an increased density to the thickened LV myocardium suggestive of an infiltrative process (e.g., amyloidosis)  Normal right ventricular size with normal right ventricular systolic function.  Severe biatrial enlargement.  Trivial pericardial effusion.  The pulmonary artery systolic pressure could not be estimated due to the lack of tricuspid insufficiency.  Normal central venous pressure (3 mmHg).  Atrial fibrillation observed    Assessment:       1. Chronic combined systolic and diastolic CHF, NYHA class 2    2. Persistent atrial fibrillation    3. Essential hypertension    4. Stage 3a chronic kidney  disease    5. Kidney stone    6. Long term (current) use of anticoagulants    7. Severe obesity (BMI 35.0-39.9) with comorbidity    8. Prediabetes    9. MEHNAZ (obstructive sleep apnea)                 Plan:         Chronic combined systolic and diastolic CHF, NYHA class 2  Patient is identified as having Combined Systolic and Diastolic heart failure that is Chronic. CHF is currently controlled. Latest ECHO performed and demonstrates- Results for orders placed during the hospital encounter of 10/02/23    Echo Saline Bubble? No    Interpretation Summary    Left Ventricle: The left ventricle is normal in size. There is concentric remodeling. Normal wall motion. There is mildly reduced systolic function. Ejection fraction by visual approximation is 45%. Unable to assess diastolic function due to atrial fibrillation.    Left Atrium: Left atrium is severely dilated.    Right Ventricle: Systolic function is normal.    Right Atrium: Right atrium is moderately dilated.    Tricuspid Valve: There is mild regurgitation.    Pulmonary Artery: The estimated pulmonary artery systolic pressure is 57 mmHg.    IVC/SVC: Normal venous pressure at 3 mmHg.  . Continue Beta Blocker, Furosemide, Aldactone and ARNI and monitor clinical status closely. Monitor on telemetry. Patient is on CHF pathway.  Monitor strict Is&Os and daily weights.  Place on fluid restriction of 2 L. Cardiology has been consulted. Continue to stress to patient importance of self efficacy and  on diet for CHF. Last BNP reviewed- and noted below @LABRCNTIP(BNP,BNPTRIAGEBLO)@.       -lisinopril 40 mg Dc'd and was started on Entresto     -Carvedilol 25 mg , furosemide 40 mg  BID, spironolactone  12.5 mg   - Entresto 24-26 mg and titrate to GDMT, continue to take low dose until next dose is obtained   -increase Entresto 49-51 mg   -continue titrate medications to GDMT   -Discussed lifestyle modifications- diet, exercise, weight loss  -instructed to weigh self daily,  notify office if > 3 pound weight gain in 1 day or 5 pounds in 1 week         Persistent atrial fibrillation  -[id number]  -continue AC therapy-apixaban 5 mg      Essential hypertension  -Goal BP < 130/80, medication compliant  -home -130s/70-80s  -continue medication regimen:carvedilol 25 mg, furosemide  40 mg BID   -continue HF management  -discussed lifestyle modifications- diet, exercise, weight loss   -check BP twice daily and maintain log, bring log to all appts.     Atrial flutter  -continue AC therapy- apixaban 5 mg     Atrial fibrillation  -[id number]  -continue AC therapy-apixaban 5 mg (patient has received funding)       Stage 3a chronic kidney disease  -Baseline Cr 1.4, CKD clinically related to longstanding HTN   -followed by Nephrology   - Following with Urology for renal stones  - Avoid NSAIDs, increase hydration, recommend further BP control     Kidney stone  -Followed by Urology     Long term (current) use of anticoagulants  -continue Apixaban 5 mg     Severe obesity (BMI 35.0-39.9) with comorbidity  -encouraged weight loss  -discussed health risk associated w obesity     Prediabetes  -followed by PCP    Obesity, Class II, BMI 35-39.9, with comorbidity  -encouraged weight loss  -discussed health risk associated w obesity     MEHNAZ (obstructive sleep apnea)  -not currently on CPAP               Total duration of face to face visit time 30 minutes.  Total time spent counseling greater than fifty percent of total visit time.  Counseling included discussion regarding imaging findings, diagnosis, possibilities, treatment options, risks and benefits.  The patient had many questions regarding the options and long-term effects      Miki Salgado NP  Cardiology

## 2023-10-06 NOTE — ASSESSMENT & PLAN NOTE
Patient is identified as having Combined Systolic and Diastolic heart failure that is Chronic. CHF is currently controlled. Latest ECHO performed and demonstrates- Results for orders placed during the hospital encounter of 10/02/23    Echo Saline Bubble? No    Interpretation Summary    Left Ventricle: The left ventricle is normal in size. There is concentric remodeling. Normal wall motion. There is mildly reduced systolic function. Ejection fraction by visual approximation is 45%. Unable to assess diastolic function due to atrial fibrillation.    Left Atrium: Left atrium is severely dilated.    Right Ventricle: Systolic function is normal.    Right Atrium: Right atrium is moderately dilated.    Tricuspid Valve: There is mild regurgitation.    Pulmonary Artery: The estimated pulmonary artery systolic pressure is 57 mmHg.    IVC/SVC: Normal venous pressure at 3 mmHg.  . Continue Beta Blocker, Furosemide, Aldactone and ARNI and monitor clinical status closely. Monitor on telemetry. Patient is on CHF pathway.  Monitor strict Is&Os and daily weights.  Place on fluid restriction of 2 L. Cardiology has been consulted. Continue to stress to patient importance of self efficacy and  on diet for CHF. Last BNP reviewed- and noted below @LABRCNTIP(BNP,BNPTRIAGEBLO)@.       -lisinopril 40 mg Dc'd and was started on Entresto     -Carvedilol 25 mg , furosemide 40 mg  BID, spironolactone  12.5 mg   - Entresto 24-26 mg and titrate to GDMT, continue to take low dose until next dose is obtained   -increase Entresto 49-51 mg   -continue titrate medications to GDMT   -Discussed lifestyle modifications- diet, exercise, weight loss  -instructed to weigh self daily, notify office if > 3 pound weight gain in 1 day or 5 pounds in 1 week

## 2023-10-06 NOTE — ASSESSMENT & PLAN NOTE
-Goal BP < 130/80, medication compliant  -home -130s/70-80s  -continue medication regimen:carvedilol 25 mg, furosemide  40 mg BID   -continue HF management  -discussed lifestyle modifications- diet, exercise, weight loss   -check BP twice daily and maintain log, bring log to all appts.

## 2023-10-11 ENCOUNTER — PATIENT MESSAGE (OUTPATIENT)
Dept: CARDIOLOGY | Facility: CLINIC | Age: 68
End: 2023-10-11

## 2023-10-23 DIAGNOSIS — I50.42 CHRONIC COMBINED SYSTOLIC AND DIASTOLIC CHF, NYHA CLASS 2: ICD-10-CM

## 2023-10-23 RX ORDER — SACUBITRIL AND VALSARTAN 49; 51 MG/1; MG/1
1 TABLET, FILM COATED ORAL 2 TIMES DAILY
Qty: 60 TABLET | Refills: 6 | Status: SHIPPED | OUTPATIENT
Start: 2023-10-23 | End: 2023-11-15 | Stop reason: SDUPTHER

## 2023-10-23 RX ORDER — SACUBITRIL AND VALSARTAN 24; 26 MG/1; MG/1
1 TABLET, FILM COATED ORAL 2 TIMES DAILY
Qty: 60 TABLET | Refills: 6 | Status: SHIPPED | OUTPATIENT
Start: 2023-10-23 | End: 2023-11-13

## 2023-11-13 ENCOUNTER — OFFICE VISIT (OUTPATIENT)
Dept: FAMILY MEDICINE | Facility: CLINIC | Age: 68
End: 2023-11-13

## 2023-11-13 VITALS
SYSTOLIC BLOOD PRESSURE: 128 MMHG | HEART RATE: 59 BPM | OXYGEN SATURATION: 97 % | BODY MASS INDEX: 37.38 KG/M2 | WEIGHT: 261.13 LBS | DIASTOLIC BLOOD PRESSURE: 72 MMHG | HEIGHT: 70 IN

## 2023-11-13 DIAGNOSIS — I10 ESSENTIAL HYPERTENSION: ICD-10-CM

## 2023-11-13 DIAGNOSIS — R73.03 PREDIABETES: ICD-10-CM

## 2023-11-13 DIAGNOSIS — E66.01 CLASS 2 SEVERE OBESITY DUE TO EXCESS CALORIES WITH SERIOUS COMORBIDITY AND BODY MASS INDEX (BMI) OF 37.0 TO 37.9 IN ADULT: ICD-10-CM

## 2023-11-13 DIAGNOSIS — N32.89 BLADDER MASS: ICD-10-CM

## 2023-11-13 DIAGNOSIS — I50.42 CHRONIC COMBINED SYSTOLIC AND DIASTOLIC CHF, NYHA CLASS 2: Primary | ICD-10-CM

## 2023-11-13 DIAGNOSIS — N18.31 STAGE 3A CHRONIC KIDNEY DISEASE: ICD-10-CM

## 2023-11-13 PROCEDURE — 99999 PR PBB SHADOW E&M-EST. PATIENT-LVL IV: CPT | Mod: PBBFAC,,, | Performed by: STUDENT IN AN ORGANIZED HEALTH CARE EDUCATION/TRAINING PROGRAM

## 2023-11-13 PROCEDURE — 99214 OFFICE O/P EST MOD 30 MIN: CPT | Mod: S$PBB,,, | Performed by: STUDENT IN AN ORGANIZED HEALTH CARE EDUCATION/TRAINING PROGRAM

## 2023-11-13 PROCEDURE — 99214 PR OFFICE/OUTPT VISIT, EST, LEVL IV, 30-39 MIN: ICD-10-PCS | Mod: S$PBB,,, | Performed by: STUDENT IN AN ORGANIZED HEALTH CARE EDUCATION/TRAINING PROGRAM

## 2023-11-13 PROCEDURE — 99999 PR PBB SHADOW E&M-EST. PATIENT-LVL IV: ICD-10-PCS | Mod: PBBFAC,,, | Performed by: STUDENT IN AN ORGANIZED HEALTH CARE EDUCATION/TRAINING PROGRAM

## 2023-11-13 PROCEDURE — 99214 OFFICE O/P EST MOD 30 MIN: CPT | Mod: PBBFAC,PN | Performed by: STUDENT IN AN ORGANIZED HEALTH CARE EDUCATION/TRAINING PROGRAM

## 2023-11-13 NOTE — PROGRESS NOTES
Subjective:     66 yo M with prediabetes, microcytosis, CKD stage 3a, sHTN, A-fib, HFrEF ( LVEF 30-35% in 04/2023), MEHNAZ, obesity, prior TIA and CKD stage 3a with recent incidental finding = benign bladder mass for f/u on BP mgt and heart failure symptoms . He endorses no new complaints today . He has been compliant with all his medications. He also described his mood to be great today.    Past Medical History:   Diagnosis Date    Anticoagulant long-term use     Arthritis     knee    Atrial fibrillation     Hypertension      Past Surgical History:   Procedure Laterality Date    TOTAL HIP ARTHROPLASTY Right 2013     Social History     Socioeconomic History    Marital status:    Tobacco Use    Smoking status: Never     Passive exposure: Never    Smokeless tobacco: Never   Substance and Sexual Activity    Alcohol use: No     Alcohol/week: 0.0 standard drinks of alcohol    Sexual activity: Yes     Partners: Female     History reviewed. No pertinent family history.    Review of patient's allergies indicates:  No Known Allergies    Medication List with Changes/Refills   Current Medications    APIXABAN (ELIQUIS) 5 MG TAB    Take 5 mg by mouth 2 (two) times daily.    ASPIRIN (ECOTRIN) 81 MG EC TABLET    Take 1 tablet (81 mg total) by mouth once daily.    ATORVASTATIN (LIPITOR) 40 MG TABLET    Take 1 tablet (40 mg total) by mouth once daily.    CARVEDILOL (COREG) 25 MG TABLET    Take 1 tablet (25 mg total) by mouth 2 (two) times daily with meals.    DUTASTERIDE (AVODART) 0.5 MG CAPSULE    Take 1 capsule (0.5 mg total) by mouth once daily.    FUROSEMIDE (LASIX) 40 MG TABLET    Take 1 tablet (40 mg total) by mouth 2 (two) times daily.    SACUBITRIL-VALSARTAN (ENTRESTO) 49-51 MG PER TABLET    Take 1 tablet by mouth 2 (two) times daily.    SPIRONOLACTONE (ALDACTONE) 25 MG TABLET    Take 0.5 tablets (12.5 mg total) by mouth once daily.   Discontinued Medications    SACUBITRIL-VALSARTAN (ENTRESTO) 24-26 MG PER TABLET     "Take 1 tablet by mouth 2 (two) times daily.       Review of Systems   Constitutional: Negative for diaphoresis and fever.   HENT:  Negative for congestion and hearing loss.    Eyes:  Negative for blurred vision and pain.   Cardiovascular:  Positive for leg swelling. Negative for chest pain, claudication, dyspnea on exertion, near-syncope, palpitations and syncope.   Respiratory:  Negative for shortness of breath and sleep disturbances due to breathing.    Hematologic/Lymphatic: Negative for bleeding problem. Does not bruise/bleed easily.   Skin:  Negative for color change and poor wound healing.   Gastrointestinal:  Negative for abdominal pain and nausea.   Genitourinary:  Negative for bladder incontinence and flank pain.   Neurological:  Negative for focal weakness and light-headedness.        Objective:   /72   Pulse (!) 59   Ht 5' 10" (1.778 m)   Wt 118.5 kg (261 lb 2.2 oz)   SpO2 97%   BMI 37.47 kg/m²    Physical Exam  Vitals reviewed.   Constitutional:       General: He is not in acute distress.     Appearance: He is well-developed. He is not diaphoretic.   HENT:      Head: Normocephalic and atraumatic.      Mouth/Throat:      Mouth: Mucous membranes are moist.      Pharynx: Oropharynx is clear.   Eyes:      General: No scleral icterus.     Pupils: Pupils are equal, round, and reactive to light.   Neck:      Vascular: No JVD.   Cardiovascular:      Rate and Rhythm: Bradycardia present. Rhythm irregular.      Pulses: Intact distal pulses.           Radial pulses are 2+ on the right side and 2+ on the left side.        Dorsalis pedis pulses are 2+ on the right side and 2+ on the left side.        Posterior tibial pulses are 2+ on the right side and 2+ on the left side.      Heart sounds: S1 normal and S2 normal. No murmur heard.     No friction rub. No gallop.   Pulmonary:      Effort: Pulmonary effort is normal. No respiratory distress.      Breath sounds: Normal breath sounds. No wheezing or rales. "   Chest:      Chest wall: No tenderness.   Abdominal:      General: Bowel sounds are normal. There is no distension.      Palpations: Abdomen is soft. There is no mass.      Tenderness: There is no abdominal tenderness. There is no rebound.   Musculoskeletal:         General: No tenderness. Normal range of motion.      Cervical back: Normal range of motion and neck supple.      Right lower le+ Edema (Trace bilaterally) present.      Left lower le+ Edema present.   Skin:     General: Skin is warm and dry.      Coloration: Skin is not pale.   Neurological:      Mental Status: He is alert and oriented to person, place, and time.      Coordination: Coordination normal.      Deep Tendon Reflexes: Reflexes normal.   Psychiatric:         Behavior: Behavior normal.         Judgment: Judgment normal.           EKG 23- reviewed  A-Fib w T wave abnormality, HR 70    Cardiac echo 4/3/23-reviewed   Summary    The left ventricle is mildly enlarged with marked concentric hypertrophy and moderately decreased systolic function. The estimated ejection fraction is 30-35%.  There is an increased density to the thickened LV myocardium suggestive of an infiltrative process (e.g., amyloidosis)  Normal right ventricular size with normal right ventricular systolic function.  Severe biatrial enlargement.  Trivial pericardial effusion.  The pulmonary artery systolic pressure could not be estimated due to the lack of tricuspid insufficiency.  Normal central venous pressure (3 mmHg).  Atrial fibrillation observed    Assessment:       1. Chronic combined systolic and diastolic CHF, NYHA class 2    2. Essential hypertension    3. Prediabetes    4. Bladder mass    5. Stage 3a chronic kidney disease    6. Class 2 severe obesity due to excess calories with serious comorbidity and body mass index (BMI) of 37.0 to 37.9 in adult                 Plan:     -BP controlled today, now at goal (goal is <130/88)  -compensated on  examination  -currently on entresto 24-26mg with plans to increase to 49-51mg per cardiology  -c/w other regimen as prescribed  -counseled on life style modifications (low salt diet <2g, <2L fluid intake)  -c/w daily exercise (30 minutes-45 minutes daily), healthy eating habit encouraged  -medication reconciliation done with patient and patient verbalized understanding  -patient yet to f/u with urology regarding bladder mass final mgt( he was told the mass was benign)   -Last seen by [facility] urology oncologist: Dr Martínez Rene, but stated referral to another urologist as he could not do the outlined procedure in his note.  -He f/u at [facility] [place], he could not remember name of the urologist seen there  -patient advised to f/u with urologist so as to know pathology result and next line of mgt     Dr Eli Snow MD  Internal Medicine  [facility]

## 2023-11-14 ENCOUNTER — PATIENT MESSAGE (OUTPATIENT)
Dept: CARDIOLOGY | Facility: CLINIC | Age: 68
End: 2023-11-14

## 2023-11-15 ENCOUNTER — TELEPHONE (OUTPATIENT)
Dept: CARDIOLOGY | Facility: CLINIC | Age: 68
End: 2023-11-15

## 2023-11-15 DIAGNOSIS — I50.42 CHRONIC COMBINED SYSTOLIC AND DIASTOLIC CHF, NYHA CLASS 2: ICD-10-CM

## 2023-11-15 RX ORDER — SACUBITRIL AND VALSARTAN 49; 51 MG/1; MG/1
1 TABLET, FILM COATED ORAL 2 TIMES DAILY
Qty: 60 TABLET | Refills: 6 | Status: SHIPPED | OUTPATIENT
Start: 2023-11-15 | End: 2023-11-30 | Stop reason: SDUPTHER

## 2023-11-17 ENCOUNTER — PATIENT MESSAGE (OUTPATIENT)
Dept: CARDIOLOGY | Facility: CLINIC | Age: 68
End: 2023-11-17

## 2023-11-21 ENCOUNTER — PATIENT MESSAGE (OUTPATIENT)
Dept: CARDIOLOGY | Facility: CLINIC | Age: 68
End: 2023-11-21

## 2023-11-27 ENCOUNTER — TELEPHONE (OUTPATIENT)
Dept: CARDIOLOGY | Facility: CLINIC | Age: 68
End: 2023-11-27

## 2023-11-30 ENCOUNTER — OFFICE VISIT (OUTPATIENT)
Dept: CARDIOLOGY | Facility: CLINIC | Age: 68
End: 2023-11-30

## 2023-11-30 VITALS
SYSTOLIC BLOOD PRESSURE: 157 MMHG | BODY MASS INDEX: 37.73 KG/M2 | WEIGHT: 263.56 LBS | HEART RATE: 78 BPM | OXYGEN SATURATION: 98 % | DIASTOLIC BLOOD PRESSURE: 87 MMHG | HEIGHT: 70 IN

## 2023-11-30 DIAGNOSIS — Z79.01 LONG TERM (CURRENT) USE OF ANTICOAGULANTS: ICD-10-CM

## 2023-11-30 DIAGNOSIS — I10 ESSENTIAL HYPERTENSION: ICD-10-CM

## 2023-11-30 DIAGNOSIS — Z01.810 PRE-OPERATIVE CARDIOVASCULAR EXAMINATION: ICD-10-CM

## 2023-11-30 DIAGNOSIS — N18.31 STAGE 3A CHRONIC KIDNEY DISEASE: ICD-10-CM

## 2023-11-30 DIAGNOSIS — E66.01 SEVERE OBESITY (BMI 35.0-39.9) WITH COMORBIDITY: ICD-10-CM

## 2023-11-30 DIAGNOSIS — N20.0 KIDNEY STONE: ICD-10-CM

## 2023-11-30 DIAGNOSIS — I50.42 CHRONIC COMBINED SYSTOLIC AND DIASTOLIC CHF, NYHA CLASS 2: Primary | ICD-10-CM

## 2023-11-30 DIAGNOSIS — I48.19 PERSISTENT ATRIAL FIBRILLATION: ICD-10-CM

## 2023-11-30 DIAGNOSIS — G47.33 OSA (OBSTRUCTIVE SLEEP APNEA): ICD-10-CM

## 2023-11-30 PROCEDURE — 99999 PR PBB SHADOW E&M-EST. PATIENT-LVL IV: CPT | Mod: PBBFAC,,,

## 2023-11-30 PROCEDURE — 99214 PR OFFICE/OUTPT VISIT, EST, LEVL IV, 30-39 MIN: ICD-10-PCS | Mod: S$PBB,,,

## 2023-11-30 PROCEDURE — 93005 ELECTROCARDIOGRAM TRACING: CPT | Mod: PBBFAC,PN | Performed by: STUDENT IN AN ORGANIZED HEALTH CARE EDUCATION/TRAINING PROGRAM

## 2023-11-30 PROCEDURE — 99999 PR PBB SHADOW E&M-EST. PATIENT-LVL IV: ICD-10-PCS | Mod: PBBFAC,,,

## 2023-11-30 PROCEDURE — 99214 OFFICE O/P EST MOD 30 MIN: CPT | Mod: S$PBB,,,

## 2023-11-30 PROCEDURE — 99214 OFFICE O/P EST MOD 30 MIN: CPT | Mod: PBBFAC,PN

## 2023-11-30 RX ORDER — SACUBITRIL AND VALSARTAN 49; 51 MG/1; MG/1
1 TABLET, FILM COATED ORAL 2 TIMES DAILY
Qty: 60 TABLET | Refills: 6 | Status: SHIPPED | OUTPATIENT
Start: 2023-11-30 | End: 2023-12-05 | Stop reason: SDUPTHER

## 2023-11-30 NOTE — ASSESSMENT & PLAN NOTE
Patient is identified as having Combined Systolic and Diastolic heart failure that is Chronic. CHF is currently controlled. Latest ECHO performed and demonstrates- Results for orders placed during the hospital encounter of 10/02/23    Echo Saline Bubble? No    Interpretation Summary    Left Ventricle: The left ventricle is normal in size. There is concentric remodeling. Normal wall motion. There is mildly reduced systolic function. Ejection fraction by visual approximation is 45%. Unable to assess diastolic function due to atrial fibrillation.    Left Atrium: Left atrium is severely dilated.    Right Ventricle: Systolic function is normal.    Right Atrium: Right atrium is moderately dilated.    Tricuspid Valve: There is mild regurgitation.    Pulmonary Artery: The estimated pulmonary artery systolic pressure is 57 mmHg.    IVC/SVC: Normal venous pressure at 3 mmHg.  . Continue Beta Blocker, Furosemide, Aldactone and ARNI and monitor clinical status closely. Monitor on telemetry. Patient is on CHF pathway.  Monitor strict Is&Os and daily weights.  Place on fluid restriction of 2 L. Cardiology has been consulted. Continue to stress to patient importance of self efficacy and  on diet for CHF. Last BNP reviewed- and noted below @LABRCNTIP(BNP,BNPTRIAGEBLO)@.       -lisinopril 40 mg Dc'd and was started on Entresto     -Carvedilol 25 mg , furosemide 40 mg  BID, spironolactone  12.5 mg   - Entresto 24-26 mg and titrate to GDMT, continue to take low dose until next dose is obtained   -continue  Entresto 49-51 mg   -continue titrate medications to GDMT   -Discussed lifestyle modifications- diet, exercise, weight loss  -instructed to weigh self daily, notify office if > 3 pound weight gain in 1 day or 5 pounds in 1 week

## 2023-11-30 NOTE — ASSESSMENT & PLAN NOTE
Cardiac Risk Estimation: per the Revised Cardiac Risk Index (Circ. 100:1043, 1999), the patient's risk factors for cardiac complications include history of congestive heart failure, putting him in: RCI RISK CLASS II (1 risk factor, risk of major cardiac compl. appr. 1.3%)  -Patient can perform > 4 METS of activity without limitations  -EKG reviewed A-fib w controlled rate 65   -Okay to hold apixaban for 2 days prior to procedure and restart post procedure   -no additional cardiac testing required prior to procedure.

## 2023-11-30 NOTE — PROGRESS NOTES
Subjective:    Patient ID:  Juvenal Bernal is a 68 y.o. male who presents for follow-up of No chief complaint on file.      PCP: Eli Snow MD     Referring Provider:     HPI: Patient is a 68 yo M w/H of HTN, A-fib, HFrEF ( LVEF 30-35%), MEHNAZ, obesity  who presents today for pre-op cardiac evaluation. He is scheduled for Kidney stone removal on 12/22/23 by Dr. Lombardi at [facility] [place].  He was last seen on 10/6/23 for f/u, HF management and was continued on medical therapy and Entresto was increased to 49-51 mg. Patient denies CP, SOB, palpitations, orthopnea, PND, pre-syncope, LOC, swelling, or claudication. He notes snoring. Patient notes medication compliance without side effects, but he has been out of his Entresto for 2 weeks.  He does monitor his home BP and now ranges 120s-140s/ 70-80s. Patient also notes dietary noncompliance with low salt diet and reports eating everything over the Thanksgiving holidays, including ham.  He walks daily. He reports slight improvement in GUERRERO        Past Medical History:   Diagnosis Date    Anticoagulant long-term use     Arthritis     knee    Atrial fibrillation     Hypertension      Past Surgical History:   Procedure Laterality Date    TOTAL HIP ARTHROPLASTY Right 2013     Social History     Socioeconomic History    Marital status:    Tobacco Use    Smoking status: Never     Passive exposure: Never    Smokeless tobacco: Never   Substance and Sexual Activity    Alcohol use: No     Alcohol/week: 0.0 standard drinks of alcohol    Sexual activity: Yes     Partners: Female     Social Determinants of Health     Financial Resource Strain: Low Risk  (11/28/2023)    Overall Financial Resource Strain (CARDIA)     Difficulty of Paying Living Expenses: Not hard at all   Food Insecurity: No Food Insecurity (11/28/2023)    Hunger Vital Sign     Worried About Running Out of Food in the Last Year: Never true     Ran Out of Food in the Last Year: Never true   Transportation Needs:  No Transportation Needs (11/28/2023)    PRAPARE - Transportation     Lack of Transportation (Medical): No     Lack of Transportation (Non-Medical): No   Physical Activity: Sufficiently Active (11/28/2023)    Exercise Vital Sign     Days of Exercise per Week: 5 days     Minutes of Exercise per Session: 30 min   Stress: No Stress Concern Present (11/28/2023)    [employer] - Occupational Stress Questionnaire     Feeling of Stress : Not at all   Social Connections: Unknown (11/28/2023)    Social Connection and Isolation Panel [NHANES]     Frequency of Communication with Friends and Family: More than three times a week     Frequency of Social Gatherings with Friends and Family: More than three times a week     Active Member of Clubs or Organizations: Yes     Attends Club or Organization Meetings: More than 4 times per year     Marital Status:    Housing Stability: Low Risk  (11/28/2023)    Housing Stability Vital Sign     Unable to Pay for Housing in the Last Year: No     Number of Places Lived in the Last Year: 1     Unstable Housing in the Last Year: No     No family history on file.    Review of patient's allergies indicates:  No Known Allergies    Medication List with Changes/Refills   Current Medications    APIXABAN (ELIQUIS) 5 MG TAB    Take 5 mg by mouth 2 (two) times daily.    ASPIRIN (ECOTRIN) 81 MG EC TABLET    Take 1 tablet (81 mg total) by mouth once daily.    ATORVASTATIN (LIPITOR) 40 MG TABLET    Take 1 tablet (40 mg total) by mouth once daily.    CARVEDILOL (COREG) 25 MG TABLET    Take 1 tablet (25 mg total) by mouth 2 (two) times daily with meals.    DUTASTERIDE (AVODART) 0.5 MG CAPSULE    Take 1 capsule (0.5 mg total) by mouth once daily.    FUROSEMIDE (LASIX) 40 MG TABLET    Take 1 tablet (40 mg total) by mouth 2 (two) times daily.    SPIRONOLACTONE (ALDACTONE) 25 MG TABLET    Take 0.5 tablets (12.5 mg total) by mouth once daily.   Changed and/or Refilled  "Medications    Modified Medication Previous Medication    SACUBITRIL-VALSARTAN (ENTRESTO) 49-51 MG PER TABLET sacubitriL-valsartan (ENTRESTO) 49-51 mg per tablet       Take 1 tablet by mouth 2 (two) times daily.    Take 1 tablet by mouth 2 (two) times daily.       Review of Systems   Constitutional: Negative for diaphoresis and fever.   HENT:  Negative for congestion and hearing loss.    Eyes:  Negative for blurred vision and pain.   Cardiovascular:  Positive for leg swelling. Negative for chest pain, claudication, dyspnea on exertion, near-syncope, palpitations and syncope.   Respiratory:  Negative for shortness of breath and sleep disturbances due to breathing.    Hematologic/Lymphatic: Negative for bleeding problem. Does not bruise/bleed easily.   Skin:  Negative for color change and poor wound healing.   Gastrointestinal:  Negative for abdominal pain and nausea.   Genitourinary:  Negative for bladder incontinence and flank pain.   Neurological:  Negative for focal weakness and light-headedness.        Objective:   BP (!) 157/87 (BP Location: Right arm, Patient Position: Sitting, BP Method: Large (Automatic))   Pulse 78   Ht 5' 10" (1.778 m)   Wt 119.6 kg (263 lb 9 oz)   SpO2 98%   BMI 37.82 kg/m²    Physical Exam  Constitutional:       Appearance: He is well-developed. He is not diaphoretic.   HENT:      Head: Normocephalic and atraumatic.   Eyes:      General: No scleral icterus.     Pupils: Pupils are equal, round, and reactive to light.   Neck:      Vascular: No JVD.   Cardiovascular:      Rate and Rhythm: Normal rate. Rhythm irregular.      Pulses: Intact distal pulses.           Radial pulses are 2+ on the right side and 2+ on the left side.        Dorsalis pedis pulses are 2+ on the right side and 2+ on the left side.        Posterior tibial pulses are 2+ on the right side and 2+ on the left side.      Heart sounds: S1 normal and S2 normal. Murmur heard.      No friction rub. No gallop.   Pulmonary: "      Effort: Pulmonary effort is normal. No respiratory distress.      Breath sounds: Normal breath sounds. No wheezing or rales.   Chest:      Chest wall: No tenderness.   Abdominal:      General: Bowel sounds are normal. There is no distension.      Palpations: Abdomen is soft. There is no mass.      Tenderness: There is no abdominal tenderness. There is no rebound.   Musculoskeletal:         General: No tenderness. Normal range of motion.      Cervical back: Normal range of motion and neck supple.      Right lower le+ Pitting Edema present.      Left lower le+ Pitting Edema present.   Skin:     General: Skin is warm and dry.      Coloration: Skin is not pale.   Neurological:      Mental Status: He is alert and oriented to person, place, and time.      Coordination: Coordination normal.      Deep Tendon Reflexes: Reflexes normal.   Psychiatric:         Behavior: Behavior normal.         Judgment: Judgment normal.             Cardiac echo 10/2/23  Summary         Left Ventricle: The left ventricle is normal in size. There is concentric remodeling. Normal wall motion. There is mildly reduced systolic function. Ejection fraction by visual approximation is 45%. Unable to assess diastolic function due to atrial fibrillation.    Left Atrium: Left atrium is severely dilated.    Right Ventricle: Systolic function is normal.    Right Atrium: Right atrium is moderately dilated.    Tricuspid Valve: There is mild regurgitation.    Pulmonary Artery: The estimated pulmonary artery systolic pressure is 57 mmHg.    IVC/SVC: Normal venous pressure at 3 mmHg.  EKG 23- reviewed  A-Fib w T wave abnormality, HR 70    Cardiac echo 4/3/23-reviewed   Summary    The left ventricle is mildly enlarged with marked concentric hypertrophy and moderately decreased systolic function. The estimated ejection fraction is 30-35%.  There is an increased density to the thickened LV myocardium suggestive of an infiltrative process (e.g.,  amyloidosis)  Normal right ventricular size with normal right ventricular systolic function.  Severe biatrial enlargement.  Trivial pericardial effusion.  The pulmonary artery systolic pressure could not be estimated due to the lack of tricuspid insufficiency.  Normal central venous pressure (3 mmHg).  Atrial fibrillation observed    Assessment:       1. Chronic combined systolic and diastolic CHF, NYHA class 2    2. Persistent atrial fibrillation    3. Essential hypertension    4. Stage 3a chronic kidney disease    5. Kidney stone    6. Long term (current) use of anticoagulants    7. Severe obesity (BMI 35.0-39.9) with comorbidity    8. MEHNAZ (obstructive sleep apnea)    9. Pre-operative cardiovascular examination                   Plan:         Chronic combined systolic and diastolic CHF, NYHA class 2  Patient is identified as having Combined Systolic and Diastolic heart failure that is Chronic. CHF is currently controlled. Latest ECHO performed and demonstrates- Results for orders placed during the hospital encounter of 10/02/23    Echo Saline Bubble? No    Interpretation Summary    Left Ventricle: The left ventricle is normal in size. There is concentric remodeling. Normal wall motion. There is mildly reduced systolic function. Ejection fraction by visual approximation is 45%. Unable to assess diastolic function due to atrial fibrillation.    Left Atrium: Left atrium is severely dilated.    Right Ventricle: Systolic function is normal.    Right Atrium: Right atrium is moderately dilated.    Tricuspid Valve: There is mild regurgitation.    Pulmonary Artery: The estimated pulmonary artery systolic pressure is 57 mmHg.    IVC/SVC: Normal venous pressure at 3 mmHg.  . Continue Beta Blocker, Furosemide, Aldactone and ARNI and monitor clinical status closely. Monitor on telemetry. Patient is on CHF pathway.  Monitor strict Is&Os and daily weights.  Place on fluid restriction of 2 L. Cardiology has been consulted.  Continue to stress to patient importance of self efficacy and  on diet for CHF. Last BNP reviewed- and noted below @LABRCNTIP(BNP,BNPTRIAGEBLO)@.       -lisinopril 40 mg Dc'd and was started on Entresto     -Carvedilol 25 mg , furosemide 40 mg  BID, spironolactone  12.5 mg   - Entresto 24-26 mg and titrate to GDMT, continue to take low dose until next dose is obtained   -continue  Entresto 49-51 mg   -continue titrate medications to GDMT   -Discussed lifestyle modifications- diet, exercise, weight loss  -instructed to weigh self daily, notify office if > 3 pound weight gain in 1 day or 5 pounds in 1 week         Persistent atrial fibrillation  -[id number] 3  -continue AC therapy-apixaban 5 mg      Essential hypertension  -Goal BP < 130/80, medication compliant  -home -140s/70-80s  -uncontrolled- in office 157/87, patient notes he consumed high salt food over the Thanksgiving holiday and he has also been out of his Entresto for 2 weeks.   -continue medication regimen:carvedilol 25 mg, furosemide 40 mg BID   -continue HF management  -discussed lifestyle modifications- diet, exercise, weight loss   -check BP twice daily and maintain log, bring log to all appts.     Atrial fibrillation  -[id number]  -continue AC therapy-apixaban 5 mg (patient has received funding)       Stage 3a chronic kidney disease  -Baseline Cr 1.4, CKD clinically related to longstanding HTN   -followed by Nephrology   - Following with Urology for renal stones  - Avoid NSAIDs, increase hydration, recommend further BP control     Kidney stone  -Followed by Urology   -planned surgery for 12/22/23    Long term (current) use of anticoagulants  -continue Apixaban 5 mg     Severe obesity (BMI 35.0-39.9) with comorbidity  -encouraged weight loss  -discussed health risk associated w obesity     MEHNAZ (obstructive sleep apnea)  -not currently on CPAP     Obesity, Class II, BMI 35-39.9, with comorbidity  -encouraged weight loss  -discussed health  risk associated w obesity     Pre-operative cardiovascular examination  Cardiac Risk Estimation: per the Revised Cardiac Risk Index (Circ. 100:1043, 1999), the patient's risk factors for cardiac complications include history of congestive heart failure, putting him in: RCI RISK CLASS II (1 risk factor, risk of major cardiac compl. appr. 1.3%)  -Patient can perform > 4 METS of activity without limitations  -EKG  -Okay to hold apixaban for 2 days prior to procedure and restart post procedure   -no additional cardiac testing required prior to procedure.       Total duration of face to face visit time 30 minutes.  Total time spent counseling greater than fifty percent of total visit time.  Counseling included discussion regarding imaging findings, diagnosis, possibilities, treatment options, risks and benefits.  The patient had many questions regarding the options and long-term effects      Miki Salgado NP  Cardiology

## 2023-11-30 NOTE — ASSESSMENT & PLAN NOTE
-Goal BP < 130/80, medication compliant  -home -140s/70-80s  -uncontrolled- in office 157/87, patient notes he consumed high salt food over the Thanksgiving holiday and he has also been out of his Entresto for 2 weeks.   -continue medication regimen:carvedilol 25 mg, furosemide 40 mg BID   -continue HF management  -discussed lifestyle modifications- diet, exercise, weight loss   -check BP twice daily and maintain log, bring log to all appts.

## 2023-12-04 ENCOUNTER — PATIENT MESSAGE (OUTPATIENT)
Dept: CARDIOLOGY | Facility: CLINIC | Age: 68
End: 2023-12-04

## 2023-12-04 DIAGNOSIS — I50.42 CHRONIC COMBINED SYSTOLIC AND DIASTOLIC CHF, NYHA CLASS 2: ICD-10-CM

## 2023-12-06 RX ORDER — SACUBITRIL AND VALSARTAN 49; 51 MG/1; MG/1
1 TABLET, FILM COATED ORAL 2 TIMES DAILY
Qty: 60 TABLET | Refills: 6 | Status: SHIPPED | OUTPATIENT
Start: 2023-12-06 | End: 2024-02-29 | Stop reason: DRUGHIGH

## 2024-02-01 ENCOUNTER — PATIENT MESSAGE (OUTPATIENT)
Dept: ADMINISTRATIVE | Facility: HOSPITAL | Age: 69
End: 2024-02-01
Payer: MEDICARE

## 2024-02-01 ENCOUNTER — PATIENT OUTREACH (OUTPATIENT)
Dept: ADMINISTRATIVE | Facility: HOSPITAL | Age: 69
End: 2024-02-01
Payer: MEDICARE

## 2024-02-15 ENCOUNTER — OFFICE VISIT (OUTPATIENT)
Dept: FAMILY MEDICINE | Facility: CLINIC | Age: 69
End: 2024-02-15
Payer: COMMERCIAL

## 2024-02-15 ENCOUNTER — TELEPHONE (OUTPATIENT)
Dept: CARDIOLOGY | Facility: CLINIC | Age: 69
End: 2024-02-15
Payer: MEDICARE

## 2024-02-15 VITALS
SYSTOLIC BLOOD PRESSURE: 136 MMHG | HEIGHT: 70 IN | DIASTOLIC BLOOD PRESSURE: 88 MMHG | OXYGEN SATURATION: 95 % | HEART RATE: 59 BPM | WEIGHT: 262.13 LBS | TEMPERATURE: 98 F | BODY MASS INDEX: 37.53 KG/M2

## 2024-02-15 DIAGNOSIS — I10 ESSENTIAL HYPERTENSION: ICD-10-CM

## 2024-02-15 DIAGNOSIS — N18.31 STAGE 3A CHRONIC KIDNEY DISEASE: ICD-10-CM

## 2024-02-15 DIAGNOSIS — R73.03 PREDIABETES: ICD-10-CM

## 2024-02-15 DIAGNOSIS — M16.10 ARTHRITIS PAIN OF HIP: ICD-10-CM

## 2024-02-15 DIAGNOSIS — D72.828 OTHER ELEVATED WHITE BLOOD CELL (WBC) COUNT: ICD-10-CM

## 2024-02-15 DIAGNOSIS — Z12.11 ENCOUNTER FOR COLORECTAL CANCER SCREENING: ICD-10-CM

## 2024-02-15 DIAGNOSIS — E66.01 CLASS 2 SEVERE OBESITY DUE TO EXCESS CALORIES WITH SERIOUS COMORBIDITY AND BODY MASS INDEX (BMI) OF 37.0 TO 37.9 IN ADULT: ICD-10-CM

## 2024-02-15 DIAGNOSIS — Z12.12 ENCOUNTER FOR COLORECTAL CANCER SCREENING: ICD-10-CM

## 2024-02-15 DIAGNOSIS — I48.19 PERSISTENT ATRIAL FIBRILLATION: ICD-10-CM

## 2024-02-15 DIAGNOSIS — I50.22 CHRONIC SYSTOLIC CONGESTIVE HEART FAILURE: ICD-10-CM

## 2024-02-15 DIAGNOSIS — Z12.5 PROSTATE CANCER SCREENING: ICD-10-CM

## 2024-02-15 DIAGNOSIS — M19.042 ARTHRITIS OF FINGER OF LEFT HAND: ICD-10-CM

## 2024-02-15 PROCEDURE — 1125F AMNT PAIN NOTED PAIN PRSNT: CPT | Mod: CPTII,S$GLB,, | Performed by: STUDENT IN AN ORGANIZED HEALTH CARE EDUCATION/TRAINING PROGRAM

## 2024-02-15 PROCEDURE — 99215 OFFICE O/P EST HI 40 MIN: CPT | Mod: S$GLB,,, | Performed by: STUDENT IN AN ORGANIZED HEALTH CARE EDUCATION/TRAINING PROGRAM

## 2024-02-15 PROCEDURE — 3079F DIAST BP 80-89 MM HG: CPT | Mod: CPTII,S$GLB,, | Performed by: STUDENT IN AN ORGANIZED HEALTH CARE EDUCATION/TRAINING PROGRAM

## 2024-02-15 PROCEDURE — 3288F FALL RISK ASSESSMENT DOCD: CPT | Mod: CPTII,S$GLB,, | Performed by: STUDENT IN AN ORGANIZED HEALTH CARE EDUCATION/TRAINING PROGRAM

## 2024-02-15 PROCEDURE — 1101F PT FALLS ASSESS-DOCD LE1/YR: CPT | Mod: CPTII,S$GLB,, | Performed by: STUDENT IN AN ORGANIZED HEALTH CARE EDUCATION/TRAINING PROGRAM

## 2024-02-15 PROCEDURE — 1159F MED LIST DOCD IN RCRD: CPT | Mod: CPTII,S$GLB,, | Performed by: STUDENT IN AN ORGANIZED HEALTH CARE EDUCATION/TRAINING PROGRAM

## 2024-02-15 PROCEDURE — 1160F RVW MEDS BY RX/DR IN RCRD: CPT | Mod: CPTII,S$GLB,, | Performed by: STUDENT IN AN ORGANIZED HEALTH CARE EDUCATION/TRAINING PROGRAM

## 2024-02-15 PROCEDURE — 3075F SYST BP GE 130 - 139MM HG: CPT | Mod: CPTII,S$GLB,, | Performed by: STUDENT IN AN ORGANIZED HEALTH CARE EDUCATION/TRAINING PROGRAM

## 2024-02-15 PROCEDURE — 99999 PR PBB SHADOW E&M-EST. PATIENT-LVL V: CPT | Mod: PBBFAC,,, | Performed by: STUDENT IN AN ORGANIZED HEALTH CARE EDUCATION/TRAINING PROGRAM

## 2024-02-15 PROCEDURE — 3008F BODY MASS INDEX DOCD: CPT | Mod: CPTII,S$GLB,, | Performed by: STUDENT IN AN ORGANIZED HEALTH CARE EDUCATION/TRAINING PROGRAM

## 2024-02-15 RX ORDER — CARVEDILOL 25 MG/1
25 TABLET ORAL 2 TIMES DAILY WITH MEALS
Qty: 180 TABLET | Refills: 3 | Status: SHIPPED | OUTPATIENT
Start: 2024-02-15 | End: 2025-02-14

## 2024-02-15 RX ORDER — FUROSEMIDE 40 MG/1
40 TABLET ORAL 2 TIMES DAILY
Qty: 180 TABLET | Refills: 3 | Status: SHIPPED | OUTPATIENT
Start: 2024-02-15 | End: 2025-02-14

## 2024-02-15 RX ORDER — DICLOFENAC SODIUM 10 MG/G
4 GEL TOPICAL DAILY
Qty: 350 G | Refills: 1 | Status: SHIPPED | OUTPATIENT
Start: 2024-02-15

## 2024-02-15 NOTE — PROGRESS NOTES
Subjective:     68 yo M with prediabetes, microcytosis, CKD stage 3a, sHTN, A-fib, HFrEF ( LVEF 30-35% in 04/2023), OA right hip s/p replacement ( 2012) , MEHNAZ, obesity, prior TIA and CKD stage 3a with recent incidental finding of  benign bladder mass for f/u on BP mgt and heart failure symptoms . He endorses left index finger pain on flexion/extension and worsening right hip dull aching pain . He has been compliant with all his medications  except eliquis he has not been taking in the past 3 weeks since re ran out of it. He also described his mood to be great today.    Past Medical History:   Diagnosis Date    Anticoagulant long-term use     Arthritis     knee    Atrial fibrillation     Hypertension      Past Surgical History:   Procedure Laterality Date    TOTAL HIP ARTHROPLASTY Right 2013     Social History     Socioeconomic History    Marital status:    Tobacco Use    Smoking status: Never     Passive exposure: Never    Smokeless tobacco: Never   Substance and Sexual Activity    Alcohol use: No     Alcohol/week: 0.0 standard drinks of alcohol    Sexual activity: Yes     Partners: Female     Social Determinants of Health     Financial Resource Strain: Medium Risk (2/15/2024)    Overall Financial Resource Strain (CARDIA)     Difficulty of Paying Living Expenses: Somewhat hard   Food Insecurity: Unknown (2/15/2024)    Hunger Vital Sign     Worried About Running Out of Food in the Last Year: Patient declined     Ran Out of Food in the Last Year: Never true   Transportation Needs: No Transportation Needs (2/15/2024)    PRAPARE - Transportation     Lack of Transportation (Medical): No     Lack of Transportation (Non-Medical): No   Physical Activity: Sufficiently Active (2/15/2024)    Exercise Vital Sign     Days of Exercise per Week: 6 days     Minutes of Exercise per Session: 30 min   Stress: No Stress Concern Present (2/15/2024)    [employer] [place] [employer] - Occupational Stress Questionnaire      Feeling of Stress : Only a little   Social Connections: Unknown (2/15/2024)    Social Connection and Isolation Panel [NHANES]     Frequency of Communication with Friends and Family: Three times a week     Frequency of Social Gatherings with Friends and Family: Twice a week     Active Member of Clubs or Organizations: No     Attends Club or Organization Meetings: Never     Marital Status:    Housing Stability: High Risk (2/15/2024)    Housing Stability Vital Sign     Unable to Pay for Housing in the Last Year: Yes     Number of Places Lived in the Last Year: 1     Unstable Housing in the Last Year: No     History reviewed. No pertinent family history.    Review of patient's allergies indicates:  No Known Allergies    Medication List with Changes/Refills   New Medications    DICLOFENAC SODIUM (VOLTAREN ARTHRITIS PAIN) 1 % GEL    Apply 4 g topically once daily.   Current Medications    ASPIRIN (ECOTRIN) 81 MG EC TABLET    Take 1 tablet (81 mg total) by mouth once daily.    ATORVASTATIN (LIPITOR) 40 MG TABLET    Take 1 tablet (40 mg total) by mouth once daily.    DUTASTERIDE (AVODART) 0.5 MG CAPSULE    Take 1 capsule (0.5 mg total) by mouth once daily.    SACUBITRIL-VALSARTAN (ENTRESTO) 49-51 MG PER TABLET    Take 1 tablet by mouth 2 (two) times daily.    SPIRONOLACTONE (ALDACTONE) 25 MG TABLET    Take 0.5 tablets (12.5 mg total) by mouth once daily.   Changed and/or Refilled Medications    Modified Medication Previous Medication    APIXABAN (ELIQUIS) 5 MG TAB apixaban (ELIQUIS) 5 mg Tab       Take 1 tablet (5 mg total) by mouth 2 (two) times daily.    Take 5 mg by mouth 2 (two) times daily.    CARVEDILOL (COREG) 25 MG TABLET carvediloL (COREG) 25 MG tablet       Take 1 tablet (25 mg total) by mouth 2 (two) times daily with meals.    Take 1 tablet (25 mg total) by mouth 2 (two) times daily with meals.    FUROSEMIDE (LASIX) 40 MG TABLET furosemide (LASIX) 40 MG tablet       Take 1 tablet (40 mg total) by  "mouth 2 (two) times daily.    Take 1 tablet (40 mg total) by mouth 2 (two) times daily.       Review of Systems   Constitutional: Negative for diaphoresis and fever.   HENT:  Negative for congestion and hearing loss.    Eyes:  Negative for blurred vision and pain.   Cardiovascular:  Positive for leg swelling. Negative for chest pain, claudication, dyspnea on exertion, near-syncope, palpitations and syncope.   Respiratory:  Negative for shortness of breath and sleep disturbances due to breathing.    Hematologic/Lymphatic: Negative for bleeding problem. Does not bruise/bleed easily.   Skin:  Negative for color change and poor wound healing.   Musculoskeletal:  Positive for arthritis.   Gastrointestinal:  Negative for abdominal pain and nausea.   Genitourinary:  Negative for bladder incontinence and flank pain.   Neurological:  Negative for focal weakness and light-headedness.        Objective:   BP (!) 159/90 (BP Location: Right arm, Patient Position: Sitting, BP Method: Large (Manual))   Pulse (!) 59   Temp 98.1 °F (36.7 °C) (Temporal)   Ht 5' 10" (1.778 m)   Wt 118.9 kg (262 lb 2 oz)   SpO2 95%   BMI 37.61 kg/m²    Physical Exam  Vitals reviewed.   Constitutional:       General: He is not in acute distress.     Appearance: He is well-developed. He is not diaphoretic.   HENT:      Head: Normocephalic and atraumatic.      Mouth/Throat:      Mouth: Mucous membranes are moist.      Pharynx: Oropharynx is clear.   Eyes:      General: No scleral icterus.     Pupils: Pupils are equal, round, and reactive to light.   Neck:      Vascular: No JVD.   Cardiovascular:      Rate and Rhythm: Bradycardia present. Rhythm irregular.      Pulses: Intact distal pulses.           Radial pulses are 2+ on the right side and 2+ on the left side.        Dorsalis pedis pulses are 2+ on the right side and 2+ on the left side.        Posterior tibial pulses are 2+ on the right side and 2+ on the left side.      Heart sounds: S1 normal " and S2 normal. No murmur heard.     No friction rub. No gallop.   Pulmonary:      Effort: Pulmonary effort is normal. No respiratory distress.      Breath sounds: Normal breath sounds. No wheezing or rales.   Chest:      Chest wall: No tenderness.   Abdominal:      General: Bowel sounds are normal. There is no distension.      Palpations: Abdomen is soft. There is no mass.      Tenderness: There is no abdominal tenderness. There is no rebound.   Musculoskeletal:         General: No tenderness. Normal range of motion.      Cervical back: Normal range of motion and neck supple.      Right lower le+ Edema (Trace bilaterally) present.      Left lower le+ Edema present.   Skin:     General: Skin is warm and dry.      Coloration: Skin is not pale.   Neurological:      Mental Status: He is alert and oriented to person, place, and time.      Coordination: Coordination normal.      Deep Tendon Reflexes: Reflexes normal.   Psychiatric:         Behavior: Behavior normal.         Judgment: Judgment normal.           EKG 23- reviewed  A-Fib w T wave abnormality, HR 70    Cardiac echo 4/3/23-reviewed   Summary    The left ventricle is mildly enlarged with marked concentric hypertrophy and moderately decreased systolic function. The estimated ejection fraction is 30-35%.  There is an increased density to the thickened LV myocardium suggestive of an infiltrative process (e.g., amyloidosis)  Normal right ventricular size with normal right ventricular systolic function.  Severe biatrial enlargement.  Trivial pericardial effusion.  The pulmonary artery systolic pressure could not be estimated due to the lack of tricuspid insufficiency.  Normal central venous pressure (3 mmHg).  Atrial fibrillation observed    Assessment:       1. Essential hypertension    2. Chronic systolic congestive heart failure    3. Prediabetes    4. Arthritis pain of hip    5. Arthritis of finger of left hand    6. Other elevated white blood cell  (WBC) count    7. Class 2 severe obesity due to excess calories with serious comorbidity and body mass index (BMI) of 37.0 to 37.9 in adult    8. Stage 3a chronic kidney disease    9. Prostate cancer screening    10. Persistent atrial fibrillation    11. Encounter for colorectal cancer screening                 Plan:     -BP uncontrolled today, no sure whether he is taking double strength entresto or not  -compensated on examination  -c/w other regimen as prescribed  -counseled on life style modifications (low salt diet <2g, <2L fluid intake)  -c/w daily exercise (30 minutes-45 minutes daily), healthy eating habit encouraged  -medication reconciliation done with patient and patient verbalized understanding  -refill for eliquis with coupon given today  -XR hip ordered  -start voltaren gel Prn for hand pain, hand exercise advised    Dr Eli Snow MD  Internal Medicine  [facility]

## 2024-02-15 NOTE — ASSESSMENT & PLAN NOTE
2/15/2024      Grazyna Dey (:  1955) is a 68 y.o. female,Established patient, here for evaluation of the following chief complaint(s):  6 Month Follow-Up (6 month follow up for HTN, IFG, and mixed hyperlipidemia.) and Discuss Labs (Discuss labs completed from 24.)        ASSESSMENT/PLAN     1. Mixed hyperlipidemia  2. Essential hypertension  -     losartan (COZAAR) 100 MG tablet; Take 1 tablet by mouth daily, Disp-90 tablet, R-3Normal  3. IFG (impaired fasting glucose)  4. Tobacco abuse  5. Encounter for screening mammogram for malignant neoplasm of breast  -     RADHA CECY DIGITAL SCREEN BILATERAL; Future  6. Personal history of tobacco use  -     MA VISIT TO DISCUSS LUNG CA SCREEN W LDCT  -     CT Lung Screen (Initial/Annual/Baseline); Future    Continue losartan 100 mg daily for hypertension.  This condition is stable and well-controlled.    She will stay off of her statin for now.  She has been off the medication for over 6 months and her cholesterol is improved compared to previous    Diabetic diet and regular physical activity encouraged for impaired fasting glucose    Order given for screening mammogram today    CT lung screening ordered due to her significant history of smoking    Smoking cessation encouraged.  She was given the information for a chiropractor who does acupuncture for smoking cessation in [place].  She will consider this.     Return in about 6 months (around 8/15/2024) for AWV.    SUBJECTIVE     Grazyna Dey is a 68 y.o.female      Here for follow up of chronic health problems including:    Patient Active Problem List   Diagnosis    Tobacco abuse    Essential hypertension    IFG (impaired fasting glucose)    Osteoarthritis of left glenohumeral joint    Shoulder joint pain    Mixed hyperlipidemia     Patient denies complaint today.  Her blood pressures have been stable.  She stopped her Crestor approximately 6 months ago when it ran out.  She has been trying to  - continue lisinopril 40mg  - started carvedilol 6.25 mg BID for aflutter/afib control and GDMT  - will hold HCTZ for now

## 2024-02-16 ENCOUNTER — PATIENT MESSAGE (OUTPATIENT)
Dept: FAMILY MEDICINE | Facility: CLINIC | Age: 69
End: 2024-02-16
Payer: MEDICARE

## 2024-02-16 ENCOUNTER — TELEPHONE (OUTPATIENT)
Dept: GASTROENTEROLOGY | Facility: CLINIC | Age: 69
End: 2024-02-16
Payer: MEDICARE

## 2024-02-22 ENCOUNTER — PATIENT MESSAGE (OUTPATIENT)
Dept: ADMINISTRATIVE | Facility: HOSPITAL | Age: 69
End: 2024-02-22
Payer: MEDICARE

## 2024-02-22 ENCOUNTER — PATIENT OUTREACH (OUTPATIENT)
Dept: ADMINISTRATIVE | Facility: HOSPITAL | Age: 69
End: 2024-02-22
Payer: MEDICARE

## 2024-02-22 DIAGNOSIS — Z12.11 COLON CANCER SCREENING: Primary | ICD-10-CM

## 2024-02-22 NOTE — PROGRESS NOTES
Population Health Chart Review & Patient Outreach Details    Outreach Performed: YES Portal    Additional [facility] Health Notes:           Updates Requested / Reviewed:      Care Everywhere, Care Team Updated, Removed  or Duplicate Orders, and Immunizations Reconciliation Completed or Queried: [place]         Health Maintenance Topics Overdue:    Health Maintenance Due   Topic Date Due    Shingles Vaccine (1 of 2) Never done    RSV Vaccine (Age 60+ and Pregnant patients) (1 - 1-dose 60+ series) Never done    Colorectal Cancer Screening  2019    COVID-19 Vaccine (2023- season) 2023         Health Maintenance Topic(s) Outreach Outcomes & Actions Taken:    Colorectal Cancer Screening - Outreach Outcomes & Actions Taken  : Colonoscopy Case Request / Referral / Home Test Order Placed

## 2024-02-26 ENCOUNTER — OFFICE VISIT (OUTPATIENT)
Dept: ORTHOPEDICS | Facility: CLINIC | Age: 69
End: 2024-02-26
Payer: COMMERCIAL

## 2024-02-26 VITALS — HEIGHT: 70 IN | BODY MASS INDEX: 37.61 KG/M2

## 2024-02-26 DIAGNOSIS — G56.02 CARPAL TUNNEL SYNDROME OF LEFT WRIST: Primary | ICD-10-CM

## 2024-02-26 DIAGNOSIS — M16.10 ARTHRITIS PAIN OF HIP: ICD-10-CM

## 2024-02-26 PROCEDURE — 3044F HG A1C LEVEL LT 7.0%: CPT | Mod: CPTII,S$GLB,, | Performed by: ORTHOPAEDIC SURGERY

## 2024-02-26 PROCEDURE — 99203 OFFICE O/P NEW LOW 30 MIN: CPT | Mod: 25,S$GLB,, | Performed by: ORTHOPAEDIC SURGERY

## 2024-02-26 PROCEDURE — 99999 PR PBB SHADOW E&M-EST. PATIENT-LVL III: CPT | Mod: PBBFAC,,, | Performed by: ORTHOPAEDIC SURGERY

## 2024-02-26 PROCEDURE — 1125F AMNT PAIN NOTED PAIN PRSNT: CPT | Mod: CPTII,S$GLB,, | Performed by: ORTHOPAEDIC SURGERY

## 2024-02-26 PROCEDURE — 1159F MED LIST DOCD IN RCRD: CPT | Mod: CPTII,S$GLB,, | Performed by: ORTHOPAEDIC SURGERY

## 2024-02-26 PROCEDURE — 1101F PT FALLS ASSESS-DOCD LE1/YR: CPT | Mod: CPTII,S$GLB,, | Performed by: ORTHOPAEDIC SURGERY

## 2024-02-26 PROCEDURE — 3288F FALL RISK ASSESSMENT DOCD: CPT | Mod: CPTII,S$GLB,, | Performed by: ORTHOPAEDIC SURGERY

## 2024-02-26 PROCEDURE — 3008F BODY MASS INDEX DOCD: CPT | Mod: CPTII,S$GLB,, | Performed by: ORTHOPAEDIC SURGERY

## 2024-02-26 PROCEDURE — 20526 THER INJECTION CARP TUNNEL: CPT | Mod: LT,S$GLB,, | Performed by: ORTHOPAEDIC SURGERY

## 2024-02-26 RX ORDER — TRIAMCINOLONE ACETONIDE 40 MG/ML
20 INJECTION, SUSPENSION INTRA-ARTICULAR; INTRAMUSCULAR
Status: COMPLETED | OUTPATIENT
Start: 2024-02-26 | End: 2024-02-26

## 2024-02-26 RX ADMIN — TRIAMCINOLONE ACETONIDE 20 MG: 40 INJECTION, SUSPENSION INTRA-ARTICULAR; INTRAMUSCULAR at 10:02

## 2024-02-26 NOTE — PROGRESS NOTES
"Subjective:      Patient ID: Juvenal Bernal is a 68 y.o. male.    Chief Complaint: Pain and Tingling of the Left Hand      HPI  Juvenal Bernal is a  68 y.o. male presenting today for left hand and wrist pain.  There was not a history of trauma.  Onset of symptoms began several months ago   Does report numbness tingling usually at night and especially stiffness in the morning   No triggering reported but he does have difficulty with gripping   .      Review of patient's allergies indicates:  No Known Allergies      Current Outpatient Medications   Medication Sig Dispense Refill    apixaban (ELIQUIS) 5 mg Tab Take 1 tablet (5 mg total) by mouth 2 (two) times daily. 60 tablet 3    aspirin (ECOTRIN) 81 MG EC tablet Take 1 tablet (81 mg total) by mouth once daily. 30 tablet 2    atorvastatin (LIPITOR) 40 MG tablet Take 1 tablet (40 mg total) by mouth once daily. 90 tablet 3    carvediloL (COREG) 25 MG tablet Take 1 tablet (25 mg total) by mouth 2 (two) times daily with meals. 180 tablet 3    diclofenac sodium (VOLTAREN ARTHRITIS PAIN) 1 % Gel Apply 4 g topically once daily. 350 g 1    dutasteride (AVODART) 0.5 mg capsule Take 1 capsule (0.5 mg total) by mouth once daily. 90 capsule 3    furosemide (LASIX) 40 MG tablet Take 1 tablet (40 mg total) by mouth 2 (two) times daily. 180 tablet 3    sacubitriL-valsartan (ENTRESTO) 49-51 mg per tablet Take 1 tablet by mouth 2 (two) times daily. 60 tablet 6    spironolactone (ALDACTONE) 25 MG tablet Take 0.5 tablets (12.5 mg total) by mouth once daily. 45 tablet 3     No current facility-administered medications for this visit.       Past Medical History:   Diagnosis Date    Anticoagulant long-term use     Arthritis     knee    Atrial fibrillation     Hypertension        Past Surgical History:   Procedure Laterality Date    TOTAL HIP ARTHROPLASTY Right 2013       Review of Systems:  ROS    OBJECTIVE:     PHYSICAL EXAM:  Height: 5' 10" (177.8 cm)    Vitals:    02/26/24 0947 " "  Height: 5' 10" (1.778 m)   PainSc: 10-Worst pain ever   PainLoc: Hand     Well developed, well nourished male in no acute distress  Alert and oriented x 3  HEENT- Normal exam  Lungs- Clear to auscultation  Heart- Regular rate and rhythm  Abdomen- Soft nontender  Extremity exam- examination left hand mild diffuse swelling noted positive Tinel sign  Phalen's test negative   Range of motion fingers full  strength decreased   No triggering       RADIOGRAPHS:  None  Comments: I have personally reviewed the imaging and I agree with the above radiologist's report.    ASSESSMENT/PLAN:     IMPRESSION:  Left carpal tunnel syndrome    PLAN:  I explained the nature of the problem to the patient   Given him a wrist splint for nighttime use  Also recommended injection  After pause for time-out identified the left carpal tunnel injected with Kenalog 20 mg 0.5 cc xylocaine sterile technique   Tolerated the procedure well without complication   I have ordered nerve conduction studies left hand   Follow up after the nerve test is complete       - We talked at length about the anatomy and pathophysiology of   Encounter Diagnoses   Name Primary?    Arthritis pain of hip     Carpal tunnel syndrome of left wrist Yes           Disclaimer: This note has been generated using voice-recognition software. There may be typographical errors that have been missed during proof-reading.     "

## 2024-02-29 ENCOUNTER — OFFICE VISIT (OUTPATIENT)
Dept: CARDIOLOGY | Facility: CLINIC | Age: 69
End: 2024-02-29
Payer: COMMERCIAL

## 2024-02-29 ENCOUNTER — TELEPHONE (OUTPATIENT)
Dept: CARDIOLOGY | Facility: CLINIC | Age: 69
End: 2024-02-29

## 2024-02-29 ENCOUNTER — TELEPHONE (OUTPATIENT)
Dept: GASTROENTEROLOGY | Facility: CLINIC | Age: 69
End: 2024-02-29
Payer: MEDICARE

## 2024-02-29 VITALS
HEIGHT: 70 IN | DIASTOLIC BLOOD PRESSURE: 88 MMHG | HEART RATE: 48 BPM | SYSTOLIC BLOOD PRESSURE: 112 MMHG | WEIGHT: 266.44 LBS | OXYGEN SATURATION: 98 % | BODY MASS INDEX: 38.15 KG/M2

## 2024-02-29 DIAGNOSIS — R73.03 PREDIABETES: ICD-10-CM

## 2024-02-29 DIAGNOSIS — I48.19 PERSISTENT ATRIAL FIBRILLATION: ICD-10-CM

## 2024-02-29 DIAGNOSIS — N18.31 STAGE 3A CHRONIC KIDNEY DISEASE: ICD-10-CM

## 2024-02-29 DIAGNOSIS — I10 ESSENTIAL HYPERTENSION: ICD-10-CM

## 2024-02-29 DIAGNOSIS — I50.42 CHRONIC COMBINED SYSTOLIC AND DIASTOLIC CHF, NYHA CLASS 2: Primary | ICD-10-CM

## 2024-02-29 DIAGNOSIS — Z79.01 LONG TERM (CURRENT) USE OF ANTICOAGULANTS: ICD-10-CM

## 2024-02-29 DIAGNOSIS — I48.3 TYPICAL ATRIAL FLUTTER: ICD-10-CM

## 2024-02-29 DIAGNOSIS — G47.33 OSA (OBSTRUCTIVE SLEEP APNEA): ICD-10-CM

## 2024-02-29 DIAGNOSIS — E66.01 SEVERE OBESITY (BMI 35.0-39.9) WITH COMORBIDITY: ICD-10-CM

## 2024-02-29 PROCEDURE — 1159F MED LIST DOCD IN RCRD: CPT | Mod: CPTII,S$GLB,,

## 2024-02-29 PROCEDURE — 1101F PT FALLS ASSESS-DOCD LE1/YR: CPT | Mod: CPTII,S$GLB,,

## 2024-02-29 PROCEDURE — 1160F RVW MEDS BY RX/DR IN RCRD: CPT | Mod: CPTII,S$GLB,,

## 2024-02-29 PROCEDURE — 3044F HG A1C LEVEL LT 7.0%: CPT | Mod: CPTII,S$GLB,,

## 2024-02-29 PROCEDURE — 3288F FALL RISK ASSESSMENT DOCD: CPT | Mod: CPTII,S$GLB,,

## 2024-02-29 PROCEDURE — 1125F AMNT PAIN NOTED PAIN PRSNT: CPT | Mod: CPTII,S$GLB,,

## 2024-02-29 PROCEDURE — 99214 OFFICE O/P EST MOD 30 MIN: CPT | Mod: S$GLB,,,

## 2024-02-29 PROCEDURE — 3079F DIAST BP 80-89 MM HG: CPT | Mod: CPTII,S$GLB,,

## 2024-02-29 PROCEDURE — 3074F SYST BP LT 130 MM HG: CPT | Mod: CPTII,S$GLB,,

## 2024-02-29 PROCEDURE — 3008F BODY MASS INDEX DOCD: CPT | Mod: CPTII,S$GLB,,

## 2024-02-29 PROCEDURE — 99999 PR PBB SHADOW E&M-EST. PATIENT-LVL III: CPT | Mod: PBBFAC,,,

## 2024-02-29 RX ORDER — SACUBITRIL AND VALSARTAN 97; 103 MG/1; MG/1
1 TABLET, FILM COATED ORAL 2 TIMES DAILY
Qty: 60 TABLET | Refills: 6 | Status: SHIPPED | OUTPATIENT
Start: 2024-02-29 | End: 2024-06-06 | Stop reason: SDUPTHER

## 2024-02-29 NOTE — PROGRESS NOTES
Subjective:    Patient ID:  Juvenal Bernal is a 68 y.o. male who presents for follow-up of No chief complaint on file.      PCP: Eli Snow MD     Referring Provider:     HPI: Patient is a 66 yo M w/H of HTN, A-fib, HFrEF ( LVEF 30-35%), MEHNAZ, obesity  who presents today for f/u appt. He was last seen on 11/30/23 for pre-op cardiac evaluation for Kidney stone [place] 12[place]22/23 [place] [facility] [place] previously seen on 10/6/23 for f/u, HF management and was continued on medical therapy and Entresto was increased to 49-51 mg. Patient denies CP, SOB, palpitations, orthopnea, PND, pre-syncope, LOC, swelling, or claudication. He notes snoring. Patient notes medication compliance without side effects.  He does monitor his home BP and now ranges 120s-130s/ 70-80s. Patient also notes dietary noncompliance with low salt diet and reports eating everything over the holidays, including ham. He walks daily. He reports slight improvement in GUERRERO.         Past Medical History:   Diagnosis Date    Anticoagulant long-term use     Arthritis     knee    Atrial fibrillation     Hypertension      Past Surgical History:   Procedure Laterality Date    TOTAL HIP ARTHROPLASTY Right 2013     Social History     Socioeconomic History    Marital status:    Tobacco Use    Smoking status: Never     Passive exposure: Never    Smokeless tobacco: Never   Substance and Sexual Activity    Alcohol use: No     Alcohol/week: 0.0 standard drinks of alcohol    Sexual activity: Yes     Partners: Female     Social Determinants of Health     Financial Resource Strain: Medium Risk (2/15/2024)    Overall Financial Resource Strain (CARDIA)     Difficulty of Paying Living Expenses: Somewhat hard   Food Insecurity: Unknown (2/15/2024)    Hunger Vital Sign     Worried About Running Out of Food in the Last Year: Patient declined     Ran Out of Food in the Last Year: Never true   Transportation Needs: No Transportation Needs (2/15/2024)     PRAPARE - Transportation     Lack of Transportation (Medical): No     Lack of Transportation (Non-Medical): No   Physical Activity: Sufficiently Active (2/15/2024)    Exercise Vital Sign     Days of Exercise per Week: 6 days     Minutes of Exercise per Session: 30 min   Stress: No Stress Concern Present (2/15/2024)    [employer] - Occupational Stress Questionnaire     Feeling of Stress : Only a little   Social Connections: Unknown (2/15/2024)    Social Connection and Isolation Panel [NHANES]     Frequency of Communication with Friends and Family: Three times a week     Frequency of Social Gatherings with Friends and Family: Twice a week     Active Member of Clubs or Organizations: No     Attends Club or Organization Meetings: Never     Marital Status:    Housing Stability: High Risk (2/15/2024)    Housing Stability Vital Sign     Unable to Pay for Housing in the Last Year: Yes     Number of Places Lived in the Last Year: 1     Unstable Housing in the Last Year: No     No family history on file.    Review of patient's allergies indicates:  No Known Allergies    Medication List with Changes/Refills   New Medications    SACUBITRIL-VALSARTAN (ENTRESTO)  MG PER TABLET    Take 1 tablet by mouth 2 (two) times daily.   Current Medications    APIXABAN (ELIQUIS) 5 MG TAB    Take 1 tablet (5 mg total) by mouth 2 (two) times daily.    ASPIRIN (ECOTRIN) 81 MG EC TABLET    Take 1 tablet (81 mg total) by mouth once daily.    ATORVASTATIN (LIPITOR) 40 MG TABLET    Take 1 tablet (40 mg total) by mouth once daily.    CARVEDILOL (COREG) 25 MG TABLET    Take 1 tablet (25 mg total) by mouth 2 (two) times daily with meals.    DICLOFENAC SODIUM (VOLTAREN ARTHRITIS PAIN) 1 % GEL    Apply 4 g topically once daily.    FUROSEMIDE (LASIX) 40 MG TABLET    Take 1 tablet (40 mg total) by mouth 2 (two) times daily.    SPIRONOLACTONE (ALDACTONE) 25 MG TABLET    Take 0.5 tablets (12.5 mg total) by mouth  "once daily.   Discontinued Medications    DUTASTERIDE (AVODART) 0.5 MG CAPSULE    Take 1 capsule (0.5 mg total) by mouth once daily.    SACUBITRIL-VALSARTAN (ENTRESTO) 49-51 MG PER TABLET    Take 1 tablet by mouth 2 (two) times daily.       Review of Systems   Constitutional: Negative for diaphoresis and fever.   HENT:  Negative for congestion and hearing loss.    Eyes:  Negative for blurred vision and pain.   Cardiovascular:  Positive for leg swelling. Negative for chest pain, claudication, dyspnea on exertion, near-syncope, palpitations and syncope.   Respiratory:  Negative for shortness of breath and sleep disturbances due to breathing.    Hematologic/Lymphatic: Negative for bleeding problem. Does not bruise/bleed easily.   Skin:  Negative for color change and poor wound healing.   Gastrointestinal:  Negative for abdominal pain and nausea.   Genitourinary:  Negative for bladder incontinence and flank pain.   Neurological:  Negative for focal weakness and light-headedness.        Objective:   /88 (BP Location: Left arm, Patient Position: Sitting, BP Method: Large (Manual))   Pulse (!) 48   Ht 5' 10" (1.778 m)   Wt 120.8 kg (266 lb 6.8 oz)   SpO2 98%   BMI 38.23 kg/m²    Physical Exam  Constitutional:       Appearance: He is well-developed. He is not diaphoretic.   HENT:      Head: Normocephalic and atraumatic.   Eyes:      General: No scleral icterus.     Pupils: Pupils are equal, round, and reactive to light.   Neck:      Vascular: No JVD.   Cardiovascular:      Rate and Rhythm: Normal rate. Rhythm irregular.      Pulses: Intact distal pulses.           Radial pulses are 2+ on the right side and 2+ on the left side.        Dorsalis pedis pulses are 2+ on the right side and 2+ on the left side.        Posterior tibial pulses are 2+ on the right side and 2+ on the left side.      Heart sounds: S1 normal and S2 normal. Murmur heard.      No friction rub. No gallop.   Pulmonary:      Effort: Pulmonary " effort is normal. No respiratory distress.      Breath sounds: Normal breath sounds. No wheezing or rales.   Chest:      Chest wall: No tenderness.   Abdominal:      General: Bowel sounds are normal. There is no distension.      Palpations: Abdomen is soft. There is no mass.      Tenderness: There is no abdominal tenderness. There is no rebound.   Musculoskeletal:         General: No tenderness. Normal range of motion.      Cervical back: Normal range of motion and neck supple.      Right lower le+ Pitting Edema present.      Left lower le+ Pitting Edema present.   Skin:     General: Skin is warm and dry.      Coloration: Skin is not pale.   Neurological:      Mental Status: He is alert and oriented to person, place, and time.      Coordination: Coordination normal.      Deep Tendon Reflexes: Reflexes normal.   Psychiatric:         Behavior: Behavior normal.         Judgment: Judgment normal.             Cardiac echo 10/2/23  Summary         Left Ventricle: The left ventricle is normal in size. There is concentric remodeling. Normal wall motion. There is mildly reduced systolic function. Ejection fraction by visual approximation is 45%. Unable to assess diastolic function due to atrial fibrillation.    Left Atrium: Left atrium is severely dilated.    Right Ventricle: Systolic function is normal.    Right Atrium: Right atrium is moderately dilated.    Tricuspid Valve: There is mild regurgitation.    Pulmonary Artery: The estimated pulmonary artery systolic pressure is 57 mmHg.    IVC/SVC: Normal venous pressure at 3 mmHg.  EKG 23- reviewed  A-Fib w T wave abnormality, HR 70    Cardiac echo 4/3/23-reviewed   Summary    The left ventricle is mildly enlarged with marked concentric hypertrophy and moderately decreased systolic function. The estimated ejection fraction is 30-35%.  There is an increased density to the thickened LV myocardium suggestive of an infiltrative process (e.g., amyloidosis)  Normal  right ventricular size with normal right ventricular systolic function.  Severe biatrial enlargement.  Trivial pericardial effusion.  The pulmonary artery systolic pressure could not be estimated due to the lack of tricuspid insufficiency.  Normal central venous pressure (3 mmHg).  Atrial fibrillation observed    Assessment:       1. Chronic combined systolic and diastolic CHF, NYHA class 2    2. Persistent atrial fibrillation    3. Typical atrial flutter    4. Stage 3a chronic kidney disease    5. Long term (current) use of anticoagulants    6. Severe obesity (BMI 35.0-39.9) with comorbidity    7. Prediabetes    8. MEHNAZ (obstructive sleep apnea)    9. Essential hypertension                     Plan:         Chronic combined systolic and diastolic CHF, NYHA class 2  Patient is identified as having Combined Systolic and Diastolic heart failure that is Chronic. CHF is currently controlled. Latest ECHO performed and demonstrates- Results for orders placed during the hospital encounter of 10/02/23    Echo Saline Bubble? No    Interpretation Summary    Left Ventricle: The left ventricle is normal in size. There is concentric remodeling. Normal wall motion. There is mildly reduced systolic function. Ejection fraction by visual approximation is 45%. Unable to assess diastolic function due to atrial fibrillation.    Left Atrium: Left atrium is severely dilated.    Right Ventricle: Systolic function is normal.    Right Atrium: Right atrium is moderately dilated.    Tricuspid Valve: There is mild regurgitation.    Pulmonary Artery: The estimated pulmonary artery systolic pressure is 57 mmHg.    IVC/SVC: Normal venous pressure at 3 mmHg.  . Continue Beta Blocker, Furosemide, Aldactone and ARNI and monitor clinical status closely. Monitor on telemetry. Patient is on CHF pathway.  Monitor strict Is&Os and daily weights.  Place on fluid restriction of 2 L. Cardiology has been consulted. Continue to stress to patient importance of  self efficacy and  on diet for CHF. Last BNP reviewed- and noted below @LABRCNTIP(BNP,BNPTRIAGEBLO)@.     -Carvedilol 25 mg , furosemide 40 mg  BID, spironolactone  12.5 mg   -continue  Entresto 49-51 mg   -increase Entresto    -continue titrate medications to GDMT   -Discussed lifestyle modifications- diet, exercise, weight loss  -instructed to weigh self daily, notify office if > 3 pound weight gain in 1 day or 5 pounds in 1 week         Persistent atrial fibrillation  -[id number]  -continue AC therapy-apixaban 5 mg      Atrial flutter  -continue AC therapy- apixaban 5 mg     Stage 3a chronic kidney disease  -Baseline Cr 1.4, CKD clinically related to longstanding HTN   -followed by Nephrology   - Following with Urology for renal stones  - Avoid NSAIDs, increase hydration, recommend further BP control     Long term (current) use of anticoagulants  -continue Apixaban 5 mg     Severe obesity (BMI 35.0-39.9) with comorbidity  -encouraged weight loss  -discussed health risk associated w obesity     Prediabetes  -followed by PCP    MEHNAZ (obstructive sleep apnea)  -not currently on CPAP   -refer to sleep medicine to evaluate for sleep apnea update      Obesity, Class II, BMI 35-39.9, with comorbidity  -encouraged weight loss  -discussed health risk associated w obesity     Essential hypertension  -Goal BP < 130/80, medication compliant  -improved home -130s/70-80s  -continue medication regimen:carvedilol 25 mg, furosemide 40 mg BID   -continue HF management  -discussed lifestyle modifications- diet, exercise, weight loss   -check BP twice daily and maintain log, bring log to all appts.         Total duration of face to face visit time 30 minutes.  Total time spent counseling greater than fifty percent of total visit time.  Counseling included discussion regarding imaging findings, diagnosis, possibilities, treatment options, risks and benefits.  The patient had many questions regarding the options  and long-term effects      Miki Salgado NP  Cardiology

## 2024-02-29 NOTE — ASSESSMENT & PLAN NOTE
-Goal BP < 130/80, medication compliant  -improved home -130s/70-80s  -continue medication regimen:carvedilol 25 mg, furosemide 40 mg BID   -continue HF management  -discussed lifestyle modifications- diet, exercise, weight loss   -check BP twice daily and maintain log, bring log to all appts.

## 2024-02-29 NOTE — TELEPHONE ENCOUNTER
Contacted patient to schedule a colonoscopy. Office visit scheduled due to patient taking Eliquis.

## 2024-02-29 NOTE — ASSESSMENT & PLAN NOTE
Patient is identified as having Combined Systolic and Diastolic heart failure that is Chronic. CHF is currently controlled. Latest ECHO performed and demonstrates- Results for orders placed during the hospital encounter of 10/02/23    Echo Saline Bubble? No    Interpretation Summary    Left Ventricle: The left ventricle is normal in size. There is concentric remodeling. Normal wall motion. There is mildly reduced systolic function. Ejection fraction by visual approximation is 45%. Unable to assess diastolic function due to atrial fibrillation.    Left Atrium: Left atrium is severely dilated.    Right Ventricle: Systolic function is normal.    Right Atrium: Right atrium is moderately dilated.    Tricuspid Valve: There is mild regurgitation.    Pulmonary Artery: The estimated pulmonary artery systolic pressure is 57 mmHg.    IVC/SVC: Normal venous pressure at 3 mmHg.  . Continue Beta Blocker, Furosemide, Aldactone and ARNI and monitor clinical status closely. Monitor on telemetry. Patient is on CHF pathway.  Monitor strict Is&Os and daily weights.  Place on fluid restriction of 2 L. Cardiology has been consulted. Continue to stress to patient importance of self efficacy and  on diet for CHF. Last BNP reviewed- and noted below @LABRCNTIP(BNP,BNPTRIAGEBLO)@.     -Carvedilol 25 mg , furosemide 40 mg  BID, spironolactone  12.5 mg   -continue  Entresto 49-51 mg   -increase Entresto    -continue titrate medications to GDMT   -Discussed lifestyle modifications- diet, exercise, weight loss  -instructed to weigh self daily, notify office if > 3 pound weight gain in 1 day or 5 pounds in 1 week

## 2024-02-29 NOTE — TELEPHONE ENCOUNTER
----- Message from Fabby Flood MA sent at 2/28/2024  7:10 AM CST -----  Patient would like a call to schedule colonoscopy.

## 2024-02-29 NOTE — TELEPHONE ENCOUNTER
Left Voice Mail for patient to call back at their conveniece. I did leave a message explaining his sleep study consult.Sruthi  Medical Assistant         ----- Message from Christin Lima MA sent at 2/29/2024 10:46 AM CST -----  Regarding: FW: Sleep Consult  I have him scheduled June 19th at 9:00.  ----- Message -----  From: Sruthi Meng MA  Sent: 2/29/2024  10:35 AM CST  To: Christin Lima MA  Subject: RE: Sleep Consult                                I am so sorry- We were talking too much while I was trying to send the email and a fire drill   MEHNAZ (obstructive sleep apnea) [G47.33]     Sruthi  Medical Assistant for  Miki Salgado, NP  [phone]  [address] [address]    ----- Message -----  From: Christin Lima MA  Sent: 2/29/2024  10:15 AM CST  To: Sruthi Meng MA  Subject: FW: Sleep Consult                                What's the appt visit reason?  ----- Message -----  From: Sruthi Meng MA  Sent: 2/29/2024  10:11 AM CST  To: [facility] Clinical Support Staff  Subject: Sleep Consult                                    Can you please schedule an appointment for this patient.    Thank you    Sruthi Meng MA  [facility]

## 2024-03-06 NOTE — PROGRESS NOTES
"Subjective:      Patient ID: Juvenal Bernal is a 68 y.o. male.    Chief Complaint: Pain of the Right Hip      HPI: Juvenal Bernal is a 68 y.o. male who presents to clinic for intermittent right hip pain.The pain started around 1 year ago and is becoming progressively worse. Patient is s/p right total hip arthroplasty (unsure of exact date, somewhere around 2010).  Patient denies known RADHA. Pain is located over (points to) laterally. He reports that the pain is a 6 /10 shooting, tightness, soreness, and occasional numbness (localized to lateral hip/upper thigh) pain. Pain is 8/10 at its worst.  The pain is aggravated by walking, kneeling, climbing, and standing for long periods of time.  Pain is relieved with rest and sitting. Associated symptoms include no other symptoms. Denies weakness and any radiating pain, numbness, or tingling. Denies inability to bear weight. Denies any mechanical symptoms. Denies any fever, chills, sweats, redness about the hip, warmth, or other signs of infection. The pain is affecting ADLs and limiting desired level of activity. Ambulation reportedly has been impaired and patient is walking with an antalgic gait.     Previous treatments include none. Patient states he has been "just dealing with it".     Occupation: Retired.    Ambulating: unassisted  Diabetic:  No  Smoking:  He has never smoked.  History of DVT/PE:  Positive (pre-Alyssa), takes Aspirin 81 mg QD.       PAST MEDICAL HISTORY:    Past Medical History:   Diagnosis Date    Anticoagulant long-term use     Arthritis     knee    Atrial fibrillation     Hypertension      PAST SURGICAL HISTORY:    Past Surgical History:   Procedure Laterality Date    TOTAL HIP ARTHROPLASTY Right 2013     FAMILY HISTORY:    History reviewed. No pertinent family history.    SOCIAL HISTORY:    Social History     Occupational History    Not on file   Tobacco Use    Smoking status: Never     Passive exposure: Never    Smokeless tobacco: Never "   Substance and Sexual Activity    Alcohol use: No     Alcohol/week: 0.0 standard drinks of alcohol    Drug use: Not on file    Sexual activity: Yes     Partners: Female      MEDICATIONS:   Current Outpatient Medications:     apixaban (ELIQUIS) 5 mg Tab, Take 1 tablet (5 mg total) by mouth 2 (two) times daily., Disp: 60 tablet, Rfl: 3    aspirin (ECOTRIN) 81 MG EC tablet, Take 1 tablet (81 mg total) by mouth once daily., Disp: 30 tablet, Rfl: 2    atorvastatin (LIPITOR) 40 MG tablet, Take 1 tablet (40 mg total) by mouth once daily., Disp: 90 tablet, Rfl: 3    carvediloL (COREG) 25 MG tablet, Take 1 tablet (25 mg total) by mouth 2 (two) times daily with meals., Disp: 180 tablet, Rfl: 3    diclofenac sodium (VOLTAREN ARTHRITIS PAIN) 1 % Gel, Apply 4 g topically once daily., Disp: 350 g, Rfl: 1    furosemide (LASIX) 40 MG tablet, Take 1 tablet (40 mg total) by mouth 2 (two) times daily., Disp: 180 tablet, Rfl: 3    sacubitriL-valsartan (ENTRESTO)  mg per tablet, Take 1 tablet by mouth 2 (two) times daily., Disp: 60 tablet, Rfl: 6    spironolactone (ALDACTONE) 25 MG tablet, Take 0.5 tablets (12.5 mg total) by mouth once daily., Disp: 45 tablet, Rfl: 3    ALLERGIES:   Review of patient's allergies indicates:  No Known Allergies    Review of Systems:  Constitution: Negative for chills, fever and night sweats.   HENT: Negative for congestion and headaches.    Eyes: Negative for blurred vision or vision loss.  Cardiovascular: Negative for chest pain and syncope.   Respiratory: Negative for cough and shortness of breath.    Endocrine: Negative for polydipsia, polyphagia and polyuria.   Hematologic/Lymphatic: Negative for bleeding problem. Does not bruise/bleed easily.   Skin: Negative for dry skin, itching and rash.   Musculoskeletal: See HPI.   Gastrointestinal: Negative for abdominal pain and bowel incontinence.   Genitourinary: Negative for bladder incontinence and nocturia.   Neurological: Negative for disturbances  in coordination, loss of balance and seizures.   Psychiatric/Behavioral: Negative for depression. The patient does not have insomnia.    Allergic/Immunologic: Negative for hives and persistent infections.          Objective:      There were no vitals filed for this visit.    PHYSICAL EXAM:  General: Alert & oriented x3, well-developed and well-nourished, in no acute distress, sitting comfortably in the exam room.  Skin: Warm and dry. Capillary refill less than 2 seconds.   Head: Normocephalic and atraumatic.   Eyes: Sclera appear normal.   Nose: No deformities seen.   Ears: No deformities seen.   Neck: No tracheal deviation present.   Pulmonary/Chest: Breathing unlabored.   Neurological: Alert and oriented to person, place, and time.   Psychiatric: Mood is pleasant and affect appropriate.       RIGHT HIP:        Body habitus is obese.         The patient walks with a limp.        Resisted SLR:    Right SLR: patient unable to perform right SLR without assistance.   Left SLR: patient able to perform left SLR without pain or difficulty.  Negative for pain with SLR.         The skin over the hip has a healed scar.        Tenderness:  no local tenderness.        Range of Motion:  Flexion  90°, External Rotation -- full, Internal Rotation -- full.        Pain with rotation:  negative        Sciatic tension findings:  negative.        Shortening/lengthening compared to the contralateral side:  exam deferred.        Pulses DP present, PT present.        Motor:  normal 5/5 strength in all tested muscle groups.         Sensory:  normal.      Imaging:   X-Rays: 3 views of right hip dated 02/16/2024 , and independently reviewed, show:  Well-positioned, well-fixed right total hip arthroplasty implant without evidence of loosening or complicating process. No acute fracture or dislocation.         Assessment:       1. S/P total right hip arthroplasty    2. Right hip pain        Plan:          I explained the nature of the problem  to the patient.     I discussed at length with the patient all the different treatment options available for his right hip including anti-inflammatories, acetaminophen, rest, ice/heat, physical therapy to include strengthening exercise, and corticosteroid injections.     Medications:  OTC Tylenol as needed. Per patient, NSAIDs contraindicated due to Aspirin 81 mg daily.    Physical Therapy:  Ambulatory referral to physical therapy for hip/core strengthening and conditioning.  Pain Management/Other: Ice compress to the affected area 2-3x a day for 15-20 minutes as needed for pain management.  Orders:  Inflammatory markers including CBC w/ diff, CRP, and Sed Rate to rule out infection.   I will call/message patient with lab results.       Follow-Up:  2-3 months with Dr. Jarrell for follow-up.       All of the patient's questions were answered and the patient will contact us if they have any questions or concerns in the interim.      Argentina Haddad PA-C  [facility]  Orthopedic Surgery    Medical Dictation software was used during the dictation of portions or the entirety of this medical record.  Phonetic or grammatic errors may exist due to the use of this software. For clarification, refer to the author of the document.

## 2024-03-07 ENCOUNTER — OFFICE VISIT (OUTPATIENT)
Dept: ORTHOPEDICS | Facility: CLINIC | Age: 69
End: 2024-03-07
Payer: COMMERCIAL

## 2024-03-07 DIAGNOSIS — Z96.641 S/P TOTAL RIGHT HIP ARTHROPLASTY: Primary | ICD-10-CM

## 2024-03-07 DIAGNOSIS — R29.898 WEAKNESS OF RIGHT LOWER EXTREMITY: ICD-10-CM

## 2024-03-07 DIAGNOSIS — M25.551 RIGHT HIP PAIN: ICD-10-CM

## 2024-03-07 PROCEDURE — 1101F PT FALLS ASSESS-DOCD LE1/YR: CPT | Mod: CPTII,S$GLB,,

## 2024-03-07 PROCEDURE — 4010F ACE/ARB THERAPY RXD/TAKEN: CPT | Mod: CPTII,S$GLB,,

## 2024-03-07 PROCEDURE — 99214 OFFICE O/P EST MOD 30 MIN: CPT | Mod: S$GLB,,,

## 2024-03-07 PROCEDURE — 1125F AMNT PAIN NOTED PAIN PRSNT: CPT | Mod: CPTII,S$GLB,,

## 2024-03-07 PROCEDURE — 3044F HG A1C LEVEL LT 7.0%: CPT | Mod: CPTII,S$GLB,,

## 2024-03-07 PROCEDURE — 1159F MED LIST DOCD IN RCRD: CPT | Mod: CPTII,S$GLB,,

## 2024-03-07 PROCEDURE — 3288F FALL RISK ASSESSMENT DOCD: CPT | Mod: CPTII,S$GLB,,

## 2024-03-07 PROCEDURE — 99999 PR PBB SHADOW E&M-EST. PATIENT-LVL III: CPT | Mod: PBBFAC,,,

## 2024-03-11 ENCOUNTER — OFFICE VISIT (OUTPATIENT)
Dept: GASTROENTEROLOGY | Facility: CLINIC | Age: 69
End: 2024-03-11
Payer: COMMERCIAL

## 2024-03-11 VITALS
OXYGEN SATURATION: 99 % | BODY MASS INDEX: 36.83 KG/M2 | HEIGHT: 70 IN | HEART RATE: 70 BPM | SYSTOLIC BLOOD PRESSURE: 123 MMHG | DIASTOLIC BLOOD PRESSURE: 79 MMHG | WEIGHT: 257.25 LBS

## 2024-03-11 DIAGNOSIS — Z12.11 SCREENING FOR MALIGNANT NEOPLASM OF COLON: Primary | ICD-10-CM

## 2024-03-11 DIAGNOSIS — Z79.01 CURRENT USE OF LONG TERM ANTICOAGULATION: ICD-10-CM

## 2024-03-11 PROBLEM — I50.9 ACUTE EXACERBATION OF CHF (CONGESTIVE HEART FAILURE): Status: RESOLVED | Noted: 2023-04-01 | Resolved: 2024-03-11

## 2024-03-11 PROCEDURE — 99214 OFFICE O/P EST MOD 30 MIN: CPT | Mod: S$GLB,,, | Performed by: NURSE PRACTITIONER

## 2024-03-11 PROCEDURE — 1101F PT FALLS ASSESS-DOCD LE1/YR: CPT | Mod: CPTII,S$GLB,, | Performed by: NURSE PRACTITIONER

## 2024-03-11 PROCEDURE — 1126F AMNT PAIN NOTED NONE PRSNT: CPT | Mod: CPTII,S$GLB,, | Performed by: NURSE PRACTITIONER

## 2024-03-11 PROCEDURE — 1159F MED LIST DOCD IN RCRD: CPT | Mod: CPTII,S$GLB,, | Performed by: NURSE PRACTITIONER

## 2024-03-11 PROCEDURE — 99999 PR PBB SHADOW E&M-EST. PATIENT-LVL IV: CPT | Mod: PBBFAC,,, | Performed by: NURSE PRACTITIONER

## 2024-03-11 PROCEDURE — 1160F RVW MEDS BY RX/DR IN RCRD: CPT | Mod: CPTII,S$GLB,, | Performed by: NURSE PRACTITIONER

## 2024-03-11 PROCEDURE — 3008F BODY MASS INDEX DOCD: CPT | Mod: CPTII,S$GLB,, | Performed by: NURSE PRACTITIONER

## 2024-03-11 PROCEDURE — 3288F FALL RISK ASSESSMENT DOCD: CPT | Mod: CPTII,S$GLB,, | Performed by: NURSE PRACTITIONER

## 2024-03-11 PROCEDURE — 3044F HG A1C LEVEL LT 7.0%: CPT | Mod: CPTII,S$GLB,, | Performed by: NURSE PRACTITIONER

## 2024-03-11 PROCEDURE — 3078F DIAST BP <80 MM HG: CPT | Mod: CPTII,S$GLB,, | Performed by: NURSE PRACTITIONER

## 2024-03-11 PROCEDURE — 3074F SYST BP LT 130 MM HG: CPT | Mod: CPTII,S$GLB,, | Performed by: NURSE PRACTITIONER

## 2024-03-11 PROCEDURE — 4010F ACE/ARB THERAPY RXD/TAKEN: CPT | Mod: CPTII,S$GLB,, | Performed by: NURSE PRACTITIONER

## 2024-03-11 RX ORDER — POLYETHYLENE GLYCOL 3350, SODIUM SULFATE ANHYDROUS, SODIUM BICARBONATE, SODIUM CHLORIDE, POTASSIUM CHLORIDE 236; 22.74; 6.74; 5.86; 2.97 G/4L; G/4L; G/4L; G/4L; G/4L
POWDER, FOR SOLUTION ORAL
Qty: 4000 ML | Refills: 0 | Status: ON HOLD | OUTPATIENT
Start: 2024-03-11 | End: 2024-03-22 | Stop reason: HOSPADM

## 2024-03-11 NOTE — PROGRESS NOTES
Subjective:       Patient ID: Juvenal Bernal is a 68 y.o. male.    Chief Complaint: Colonoscopy    67 y/o male with HTN, CHF, and atrial fib on Eliquis referred by PCP for colonoscopy. Last colonoscopy in 2009 by Dr. Jolly was normal. No chest pain, shortness of breath, abdominal pain, hematochezia, melena, or unintentional weight loss. Has BMs daily; no constipation or diarrhea. No known FH colon polyps or cancer.          Past Medical History:   Diagnosis Date    Anticoagulant long-term use     Arthritis     knee    Atrial fibrillation     Hypertension        Past Surgical History:   Procedure Laterality Date    TOTAL HIP ARTHROPLASTY Right 2013       History reviewed. No pertinent family history.    Social History     Socioeconomic History    Marital status:    Tobacco Use    Smoking status: Never     Passive exposure: Never    Smokeless tobacco: Never   Substance and Sexual Activity    Alcohol use: No     Alcohol/week: 0.0 standard drinks of alcohol    Sexual activity: Yes     Partners: Female     Social Determinants of Health     Financial Resource Strain: Medium Risk (2/15/2024)    Overall Financial Resource Strain (CARDIA)     Difficulty of Paying Living Expenses: Somewhat hard   Food Insecurity: Unknown (2/15/2024)    Hunger Vital Sign     Worried About Running Out of Food in the Last Year: Patient declined     Ran Out of Food in the Last Year: Never true   Transportation Needs: No Transportation Needs (2/15/2024)    PRAPARE - Transportation     Lack of Transportation (Medical): No     Lack of Transportation (Non-Medical): No   Physical Activity: Sufficiently Active (2/15/2024)    Exercise Vital Sign     Days of Exercise per Week: 6 days     Minutes of Exercise per Session: 30 min   Stress: No Stress Concern Present (2/15/2024)    [place] [place] [employer] - Occupational Stress Questionnaire     Feeling of Stress : Only a little   Social Connections: Unknown (2/15/2024)    Social  "Connection and Isolation Panel [NHANES]     Frequency of Communication with Friends and Family: Three times a week     Frequency of Social Gatherings with Friends and Family: Twice a week     Active Member of Clubs or Organizations: No     Attends Club or Organization Meetings: Never     Marital Status:    Housing Stability: High Risk (2/15/2024)    Housing Stability Vital Sign     Unable to Pay for Housing in the Last Year: Yes     Number of Places Lived in the Last Year: 1     Unstable Housing in the Last Year: No       Review of Systems   Constitutional:  Negative for appetite change and unexpected weight change.   HENT:  Negative for trouble swallowing.    Respiratory:  Negative for shortness of breath.    Cardiovascular:  Positive for leg swelling. Negative for chest pain.   Gastrointestinal:  Negative for abdominal pain, blood in stool, constipation and diarrhea.   Psychiatric/Behavioral:  Negative for dysphoric mood.          Objective:     Vitals:    03/11/24 1345   BP: 123/79   BP Location: Left arm   Patient Position: Sitting   BP Method: Large (Automatic)   Pulse: 70   SpO2: 99%   Weight: 116.7 kg (257 lb 4.4 oz)   Height: 5' 10" (1.778 m)          Physical Exam  Constitutional:       General: He is not in acute distress.  HENT:      Head: Normocephalic.   Eyes:      Conjunctiva/sclera: Conjunctivae normal.   Cardiovascular:      Rate and Rhythm: Rhythm irregular.   Pulmonary:      Effort: Pulmonary effort is normal. No respiratory distress.      Breath sounds: Normal breath sounds.   Musculoskeletal:         General: Swelling present.      Cervical back: Normal range of motion.   Skin:     General: Skin is warm and dry.   Neurological:      Mental Status: He is alert and oriented to person, place, and time.   Psychiatric:         Mood and Affect: Mood normal.         Behavior: Behavior normal.               Assessment:         ICD-10-CM ICD-9-CM   1. Screening for malignant neoplasm of colon  " Z12.11 V76.51   2. Current use of long term anticoagulation  Z79.01 V58.61       Plan:       Screening for malignant neoplasm of colon  -     Schedule colonoscopy; case request previously ordered  -     polyethylene glycol (GOLYTELY)   236-22.74-6.74 -5.86 gram suspension; Use as directed  Dispense: 4000 mL; Refill: 0    Current use of long term anticoagulation         -     Pre-op CVS exam/Cardiac risk estimation         11/30/2023    Follow up if symptoms worsen or fail to improve.     Patient's Medications   New Prescriptions    POLYETHYLENE GLYCOL (GOLYTELY) 236-22.74-6.74 -5.86 GRAM SUSPENSION    Use as directed   Previous Medications    APIXABAN (ELIQUIS) 5 MG TAB    Take 1 tablet (5 mg total) by mouth 2 (two) times daily.    ASPIRIN (ECOTRIN) 81 MG EC TABLET    Take 1 tablet (81 mg total) by mouth once daily.    ATORVASTATIN (LIPITOR) 40 MG TABLET    Take 1 tablet (40 mg total) by mouth once daily.    CARVEDILOL (COREG) 25 MG TABLET    Take 1 tablet (25 mg total) by mouth 2 (two) times daily with meals.    DICLOFENAC SODIUM (VOLTAREN ARTHRITIS PAIN) 1 % GEL    Apply 4 g topically once daily.    FUROSEMIDE (LASIX) 40 MG TABLET    Take 1 tablet (40 mg total) by mouth 2 (two) times daily.    SACUBITRIL-VALSARTAN (ENTRESTO)  MG PER TABLET    Take 1 tablet by mouth 2 (two) times daily.    SPIRONOLACTONE (ALDACTONE) 25 MG TABLET    Take 0.5 tablets (12.5 mg total) by mouth once daily.   Modified Medications    No medications on file   Discontinued Medications    No medications on file

## 2024-03-11 NOTE — PATIENT INSTRUCTIONS
- Last dose of Eliquis on Tuesday, 3/19/2024            GOLYTELY/ COLYTE/ NULYTELY Instructions    [facility] [address]  [address]    You are scheduled for a colonoscopy with Dr. Chapman on 3/22/2024 at [facility]. You will enter through the South entrance and check in at [facility].  To ensure that your test is accurate and complete, you MUST follow these instructions listed below.  If you have any questions, please call our office at [phone].  Plan on being at the hospital for your procedure for 3-4 hours.    1.  Follow a CLEAR LIQUID DIET for the entire day before your scheduled colonoscopy.  This means no solid food the entire day starting when you wake.  You may have as much of the clear liquids as you want throughout the day.   CLEAR LIQUID DIET:   - NO DAIRY   - You can have:  Coffee with sugar (no creamer), tea, water, soda, apple or white grape juice, chicken or beef broth/bouillon (no meat, noodles, or veggies), popsicles, Jell-O, lemonade.    2.  MIX GOLYTELY/COLYTE/NULYTELY (all names for same product) WITH ONE (1) GALLON OF WATER.  YOU MAY ADD A FLAVOR PACKET OR YELLOW/GREEN POWDER DRINK MIX TO THIS.  PUT IN REFRIGERATOR.  This is easier to drink if this solution is cold, so you can mix the solution one day ahead of time and place in the refrigerator prior to drinking.  You have to drink the solution within 24 hours of mixing it.  Do NOT put this solution over ice.  It IS ok to drink with a straw.    3. AT 5 PM THE DAY BEFORE YOUR COLONOSCOPY, DRINK ONE (1) 8 OUNCE GLASS OF MIXTURE EVERY 10 MINUTES UNTIL HALF OF THE GALLON IS CONSUMED.  Keep this mixture cold and in refrigerator as much as you can while drinking it.  Place the remaining half of mixture in the refrigerator when you finish the first half.    4.  The endoscopy department will call you 1 day before your colonoscopy to tell you the exact time to arrive, AND to tell you the  exact time to drink the 2nd portion of your prep (which will be FIVE HOURS BEFORE YOUR ARRIVAL TIME).  At this time given to you, DRINK ONE (1) 8 OUNCE GLASS OF MIXTURE EVERY 10 MINUTES UNTIL THE OTHER HALF IS CONSUMED. Keep the mixture cold while you are drinking it. Once this is complete, you may not have ANYTHING else by mouth!      5.  It is ok to take MOST of your REGULAR MEDICATIONS  in the morning of your test with a SIP of water.  THE ONLY MEDS YOU NEED TO HOLD ARE YOUR DIABETES MEDICATIONS,  SOME BLOOD PRESSURE MEDS, AND BLOOD THINNERS IF OK'D BY YOUR DOCTOR.  Do NOT have anything else to eat or drink the morning of your colonoscopy.  It is ok to brush your teeth.    6.  If you are on blood thinners THAT YOU HAVE BEEN INSTRUCTED TO HOLD BY YOUR DOCTOR FOR THIS PROCEDURE, then do NOT take this the morning of your colonoscopy.  Do NOT stop these medications on your own, they must be approved to be held by your doctor.  Your colonoscopy can NOT be done if you are on these medications.  Examples of blood thinners include: Coumadin, Aggrenox, Plavix, Pradaxa, Reapro, Pletal, Xarelto, Ticagrelor, Brilinta, Eliquis, and high dose aspirin (325 mg).  You do not have to stop baby aspirin 81 mg.    7.  IF YOU ARE DIABETIC:  NO INSULIN OR ORAL MEDICATIONS THE MORNING OF THE COLONOSCOPY.  TAKE ONLY HALF THE DOSE OF YOUR INSULIN THE DAY BEFORE THE COLONOSCOPY.  DO NOT TAKE ANY ORAL DIABETIC MEDICATIONS THE DAY BEFORE THE COLONOSCOPY.  IF YOU ARE AN INSULIN DEPENDENT DIABETIC WITH UNSTABLE BLOOD SUGARS, NOTIFY YOUR PRIMARY CARE PHYSICIAN FOR INSTRUCTIONS.    8.  Please DO use your inhalers the morning of your procedure.    You will receive a call the afternoon before your colonoscopy to tell you the time to arrive.  If you have not received a call by the day before your procedure, call the Pre-op Coordinator at [phone].

## 2024-03-18 PROBLEM — R29.898 WEAKNESS OF RIGHT LOWER EXTREMITY: Status: ACTIVE | Noted: 2024-03-18

## 2024-03-18 PROBLEM — Z74.09 IMPAIRED MOBILITY AND ACTIVITIES OF DAILY LIVING: Status: ACTIVE | Noted: 2024-03-18

## 2024-03-18 PROBLEM — M25.551 RIGHT HIP PAIN: Status: ACTIVE | Noted: 2024-03-18

## 2024-03-18 PROBLEM — Z78.9 IMPAIRED MOBILITY AND ACTIVITIES OF DAILY LIVING: Status: ACTIVE | Noted: 2024-03-18

## 2024-03-18 PROBLEM — Z96.641 S/P TOTAL RIGHT HIP ARTHROPLASTY: Status: ACTIVE | Noted: 2024-03-18

## 2024-03-21 ENCOUNTER — TELEPHONE (OUTPATIENT)
Dept: GASTROENTEROLOGY | Facility: CLINIC | Age: 69
End: 2024-03-21
Payer: MEDICARE

## 2024-03-21 NOTE — TELEPHONE ENCOUNTER
instructed pt arrival time of 1000 in SDS. informed pt 1st half of prep due @5pm. 2nd half of prep due @0500. confirmed pt on clear liquids. pt voiced understanding.

## 2024-03-22 PROBLEM — Z86.0100 PERSONAL HISTORY OF COLONIC POLYPS: Status: ACTIVE | Noted: 2024-03-22

## 2024-03-22 PROBLEM — Z86.010 PERSONAL HISTORY OF COLONIC POLYPS: Status: ACTIVE | Noted: 2024-03-22

## 2024-03-22 PROBLEM — Z12.11 SCREENING FOR MALIGNANT NEOPLASM OF COLON: Status: RESOLVED | Noted: 2024-03-11 | Resolved: 2024-03-22

## 2024-04-15 ENCOUNTER — PATIENT MESSAGE (OUTPATIENT)
Dept: GASTROENTEROLOGY | Facility: CLINIC | Age: 69
End: 2024-04-15
Payer: MEDICARE

## 2024-04-24 ENCOUNTER — TELEPHONE (OUTPATIENT)
Dept: FAMILY MEDICINE | Facility: CLINIC | Age: 69
End: 2024-04-24
Payer: MEDICARE

## 2024-05-24 NOTE — ASSESSMENT & PLAN NOTE
-Followed by Urology    PT CALLED IN REQUESTING METHOTREXATE REFILL SENT TO [facility]. SCHEDULED FOR F/U ON 6/4

## 2024-05-28 DIAGNOSIS — Z00.00 ENCOUNTER FOR MEDICARE ANNUAL WELLNESS EXAM: ICD-10-CM

## 2024-06-03 ENCOUNTER — TELEPHONE (OUTPATIENT)
Dept: CARDIOLOGY | Facility: CLINIC | Age: 69
End: 2024-06-03
Payer: MEDICARE

## 2024-06-03 NOTE — TELEPHONE ENCOUNTER
Reach out to Dolores sister and advised her of the result of Mr. Vanessa's blood test.. She verbally stated that she understood.     Sruthi  Medical Assistant   [phone]- WORK  [phone] - FAX  [facility] [address]  [address] [address]    ----- Message from Miki Salgado NP sent at 5/31/2024  5:21 PM CDT -----  Please inform Mr. Bernal that his Kidney function remains consistent with prior study. Please continue all medications as prescribed.     Thank you,  Miki Salgado, DNP

## 2024-06-06 ENCOUNTER — OFFICE VISIT (OUTPATIENT)
Dept: CARDIOLOGY | Facility: CLINIC | Age: 69
End: 2024-06-06
Payer: COMMERCIAL

## 2024-06-06 VITALS
DIASTOLIC BLOOD PRESSURE: 74 MMHG | BODY MASS INDEX: 37.97 KG/M2 | OXYGEN SATURATION: 98 % | HEART RATE: 78 BPM | HEIGHT: 70 IN | WEIGHT: 265.19 LBS | SYSTOLIC BLOOD PRESSURE: 108 MMHG

## 2024-06-06 DIAGNOSIS — E66.01 CLASS 2 SEVERE OBESITY DUE TO EXCESS CALORIES WITH SERIOUS COMORBIDITY AND BODY MASS INDEX (BMI) OF 38.0 TO 38.9 IN ADULT: ICD-10-CM

## 2024-06-06 DIAGNOSIS — I48.19 PERSISTENT ATRIAL FIBRILLATION: ICD-10-CM

## 2024-06-06 DIAGNOSIS — I48.3 TYPICAL ATRIAL FLUTTER: ICD-10-CM

## 2024-06-06 DIAGNOSIS — R73.03 PREDIABETES: ICD-10-CM

## 2024-06-06 DIAGNOSIS — Z79.01 CURRENT USE OF LONG TERM ANTICOAGULATION: ICD-10-CM

## 2024-06-06 DIAGNOSIS — G47.33 OSA (OBSTRUCTIVE SLEEP APNEA): ICD-10-CM

## 2024-06-06 DIAGNOSIS — I10 ESSENTIAL HYPERTENSION: ICD-10-CM

## 2024-06-06 DIAGNOSIS — I50.42 CHRONIC COMBINED SYSTOLIC AND DIASTOLIC CHF, NYHA CLASS 2: Primary | ICD-10-CM

## 2024-06-06 DIAGNOSIS — N18.31 STAGE 3A CHRONIC KIDNEY DISEASE: ICD-10-CM

## 2024-06-06 DIAGNOSIS — I70.0 AORTIC ATHEROSCLEROSIS: ICD-10-CM

## 2024-06-06 PROBLEM — E66.812 CLASS 2 SEVERE OBESITY DUE TO EXCESS CALORIES WITH SERIOUS COMORBIDITY AND BODY MASS INDEX (BMI) OF 38.0 TO 38.9 IN ADULT: Status: ACTIVE | Noted: 2023-05-05

## 2024-06-06 PROCEDURE — 1101F PT FALLS ASSESS-DOCD LE1/YR: CPT | Mod: CPTII,S$GLB,,

## 2024-06-06 PROCEDURE — 3044F HG A1C LEVEL LT 7.0%: CPT | Mod: CPTII,S$GLB,,

## 2024-06-06 PROCEDURE — 99214 OFFICE O/P EST MOD 30 MIN: CPT | Mod: S$GLB,,,

## 2024-06-06 PROCEDURE — 3078F DIAST BP <80 MM HG: CPT | Mod: CPTII,S$GLB,,

## 2024-06-06 PROCEDURE — 4010F ACE/ARB THERAPY RXD/TAKEN: CPT | Mod: CPTII,S$GLB,,

## 2024-06-06 PROCEDURE — 1159F MED LIST DOCD IN RCRD: CPT | Mod: CPTII,S$GLB,,

## 2024-06-06 PROCEDURE — 3008F BODY MASS INDEX DOCD: CPT | Mod: CPTII,S$GLB,,

## 2024-06-06 PROCEDURE — 3288F FALL RISK ASSESSMENT DOCD: CPT | Mod: CPTII,S$GLB,,

## 2024-06-06 PROCEDURE — 99999 PR PBB SHADOW E&M-EST. PATIENT-LVL III: CPT | Mod: PBBFAC,,,

## 2024-06-06 PROCEDURE — 1126F AMNT PAIN NOTED NONE PRSNT: CPT | Mod: CPTII,S$GLB,,

## 2024-06-06 PROCEDURE — 3074F SYST BP LT 130 MM HG: CPT | Mod: CPTII,S$GLB,,

## 2024-06-06 RX ORDER — SACUBITRIL AND VALSARTAN 97; 103 MG/1; MG/1
1 TABLET, FILM COATED ORAL 2 TIMES DAILY
Qty: 180 TABLET | Refills: 3 | Status: SHIPPED | OUTPATIENT
Start: 2024-06-06

## 2024-06-06 NOTE — ASSESSMENT & PLAN NOTE
-Goal BP < 130/80, medication compliant  -home  BP 130s/70-80s  -controlled   -continue medication regimen:carvedilol 25 mg, furosemide 40 mg BID   -continue HF management  -discussed lifestyle modifications- diet, exercise, weight loss   -check BP twice daily and maintain log, bring log to all appts.

## 2024-06-06 NOTE — PROGRESS NOTES
Subjective:    Patient ID:  Juvenal Bernal is a 68 y.o. male who presents for follow-up of No chief complaint on file.      PCP: Eli Snow MD     Referring Provider:     HPI: Patient is a 68 yo M w/H of HTN, A-fib, HFrEF ( LVEF 30-35%), MEHNAZ, obesity who presents today for f/u appt. He was last seen on 2/29/24 for f/u, HF management and was continued on medical therapy and Entresto was increased to  mg. Renal function remains stable.  Patient denies CP, SOB, palpitations, orthopnea, PND, pre-syncope, LOC, swelling, or claudication. He notes snoring. Patient notes medication compliance without side effects.  He does monitor his home BP and now ranges 130s/ 70-80s. Patient also notes dietary noncompliance with low salt diet and reports eating everything over the holidays, including ham. He walks daily. He reports slight improvement in GUERRERO.         Past Medical History:   Diagnosis Date    Anticoagulant long-term use     Arthritis     right hip    Atrial fibrillation     CHF (congestive heart failure)     Hypertension     Kidney stones     Mixed hyperlipidemia     Personal history of colonic polyps 03/22/2024     Past Surgical History:   Procedure Laterality Date    COLONOSCOPY      COLONOSCOPY N/A 3/22/2024    Procedure: COLONOSCOPY;  Surgeon: Luz Maria Chapman MD;  Location: [facility];  Service: Endoscopy;  Laterality: N/A;    KIDNEY STONE SURGERY      LITHOTRIPSY      OTHER SURGICAL HISTORY      hematoma(dried up blood) removed from around hip area-left side    TOTAL HIP ARTHROPLASTY Right 01/01/2013     Social History     Socioeconomic History    Marital status:    Tobacco Use    Smoking status: Never     Passive exposure: Never    Smokeless tobacco: Never   Substance and Sexual Activity    Alcohol use: No     Alcohol/week: 0.0 standard drinks of alcohol    Drug use: Never    Sexual activity: Yes     Partners: Female     Social Determinants of Health     Financial Resource Strain:  Medium Risk (2/15/2024)    Overall Financial Resource Strain (CARDIA)     Difficulty of Paying Living Expenses: Somewhat hard   Food Insecurity: Unknown (2/15/2024)    Hunger Vital Sign     Worried About Running Out of Food in the Last Year: Patient declined     Ran Out of Food in the Last Year: Never true   Transportation Needs: No Transportation Needs (2/15/2024)    PRAPARE - Transportation     Lack of Transportation (Medical): No     Lack of Transportation (Non-Medical): No   Physical Activity: Sufficiently Active (2/15/2024)    Exercise Vital Sign     Days of Exercise per Week: 6 days     Minutes of Exercise per Session: 30 min   Recent Concern: Physical Activity - Insufficiently Active (12/26/2023)    Received from [facility], [facility]    Exercise Vital Sign     Days of Exercise per Week: 3 days     Minutes of Exercise per Session: 30 min   Stress: No Stress Concern Present (2/15/2024)    [employer] [place] [employer] - Occupational Stress Questionnaire     Feeling of Stress : Only a little   Housing Stability: High Risk (2/15/2024)    Housing Stability Vital Sign     Unable to Pay for Housing in the Last Year: Yes     Number of Places Lived in the Last Year: 1     Unstable Housing in the Last Year: No     No family history on file.    Review of patient's allergies indicates:  No Known Allergies    Medication List with Changes/Refills   Current Medications    APIXABAN (ELIQUIS) 5 MG TAB    Take 1 tablet (5 mg total) by mouth 2 (two) times daily.    ASPIRIN (ECOTRIN) 81 MG EC TABLET    Take 1 tablet (81 mg total) by mouth once daily.    ATORVASTATIN (LIPITOR) 40 MG TABLET    Take 1 tablet (40 mg total) by mouth once daily.    CARVEDILOL (COREG) 25 MG TABLET    Take 1 tablet (25 mg total) by mouth 2 (two) times daily with meals.    DICLOFENAC SODIUM (VOLTAREN ARTHRITIS PAIN) 1 % GEL    Apply 4 g topically once daily.    FUROSEMIDE (LASIX) 40 MG TABLET    Take 1 tablet (40 mg total) by mouth 2 (two)  "times daily.    MULTIVIT-MINERALS/FOLIC ACID (MULTIVITAMIN GUMMIES ORAL)    Take 1 tablet by mouth 2 (two) times a day.    SPIRONOLACTONE (ALDACTONE) 25 MG TABLET    Take 0.5 tablets (12.5 mg total) by mouth once daily.   Changed and/or Refilled Medications    Modified Medication Previous Medication    SACUBITRIL-VALSARTAN (ENTRESTO)  MG PER TABLET sacubitriL-valsartan (ENTRESTO)  mg per tablet       Take 1 tablet by mouth 2 (two) times daily.    Take 1 tablet by mouth 2 (two) times daily.       Review of Systems   Constitutional: Negative for diaphoresis and fever.   HENT:  Negative for congestion and hearing loss.    Eyes:  Negative for blurred vision and pain.   Cardiovascular:  Positive for leg swelling. Negative for chest pain, claudication, dyspnea on exertion, near-syncope, palpitations and syncope.   Respiratory:  Negative for shortness of breath and sleep disturbances due to breathing.    Hematologic/Lymphatic: Negative for bleeding problem. Does not bruise/bleed easily.   Skin:  Negative for color change and poor wound healing.   Gastrointestinal:  Negative for abdominal pain and nausea.   Genitourinary:  Negative for bladder incontinence and flank pain.   Neurological:  Negative for focal weakness and light-headedness.        Objective:   /74 (BP Location: Left arm, Patient Position: Sitting, BP Method: Large (Manual))   Pulse 78   Ht 5' 10" (1.778 m)   Wt 120.3 kg (265 lb 3.4 oz)   SpO2 98%   BMI 38.05 kg/m²    Physical Exam  Constitutional:       Appearance: He is well-developed. He is not diaphoretic.   HENT:      Head: Normocephalic and atraumatic.   Eyes:      General: No scleral icterus.     Pupils: Pupils are equal, round, and reactive to light.   Neck:      Vascular: No JVD.   Cardiovascular:      Rate and Rhythm: Normal rate. Rhythm irregular.      Pulses: Intact distal pulses.           Radial pulses are 2+ on the right side and 2+ on the left side.        Dorsalis pedis " pulses are 2+ on the right side and 2+ on the left side.        Posterior tibial pulses are 2+ on the right side and 2+ on the left side.      Heart sounds: S1 normal and S2 normal. Murmur heard.      No friction rub. No gallop.   Pulmonary:      Effort: Pulmonary effort is normal. No respiratory distress.      Breath sounds: Normal breath sounds. No wheezing or rales.   Chest:      Chest wall: No tenderness.   Abdominal:      General: Bowel sounds are normal. There is no distension.      Palpations: Abdomen is soft. There is no mass.      Tenderness: There is no abdominal tenderness. There is no rebound.   Musculoskeletal:         General: No tenderness. Normal range of motion.      Cervical back: Normal range of motion and neck supple.      Right lower le+ Pitting Edema present.      Left lower le+ Pitting Edema present.   Skin:     General: Skin is warm and dry.      Coloration: Skin is not pale.   Neurological:      Mental Status: He is alert and oriented to person, place, and time.      Coordination: Coordination normal.      Deep Tendon Reflexes: Reflexes normal.   Psychiatric:         Behavior: Behavior normal.         Judgment: Judgment normal.           Cardiac echo 10/2/23  Summary    Left Ventricle: The left ventricle is normal in size. There is concentric remodeling. Normal wall motion. There is mildly reduced systolic function. Ejection fraction by visual approximation is 45%. Unable to assess diastolic function due to atrial fibrillation.    Left Atrium: Left atrium is severely dilated.    Right Ventricle: Systolic function is normal.    Right Atrium: Right atrium is moderately dilated.    Tricuspid Valve: There is mild regurgitation.    Pulmonary Artery: The estimated pulmonary artery systolic pressure is 57 mmHg.    IVC/SVC: Normal venous pressure at 3 mmHg.  EKG 23- reviewed  A-Fib w T wave abnormality, HR 70    Cardiac echo 4/3/23-reviewed   Summary  The left ventricle is mildly  enlarged with marked concentric hypertrophy and moderately decreased systolic function. The estimated ejection fraction is 30-35%.  There is an increased density to the thickened LV myocardium suggestive of an infiltrative process (e.g., amyloidosis)  Normal right ventricular size with normal right ventricular systolic function.  Severe biatrial enlargement.  Trivial pericardial effusion.  The pulmonary artery systolic pressure could not be estimated due to the lack of tricuspid insufficiency.  Normal central venous pressure (3 mmHg).  Atrial fibrillation observed    Assessment:       1. Chronic combined systolic and diastolic CHF, NYHA class 2    2. Essential hypertension    3. Persistent atrial fibrillation    4. Typical atrial flutter    5. Stage 3a chronic kidney disease    6. Current use of long term anticoagulation    7. Class 2 severe obesity due to excess calories with serious comorbidity and body mass index (BMI) of 38.0 to 38.9 in adult    8. Prediabetes    9. MEHNAZ (obstructive sleep apnea)    10. Aortic atherosclerosis         Plan:         Chronic combined systolic and diastolic CHF, NYHA class 2  Patient is identified as having Combined Systolic and Diastolic heart failure that is Chronic. CHF is currently controlled. Latest ECHO performed and demonstrates- Results for orders placed during the hospital encounter of 10/02/23    Echo Saline Bubble? No    Interpretation Summary    Left Ventricle: The left ventricle is normal in size. There is concentric remodeling. Normal wall motion. There is mildly reduced systolic function. Ejection fraction by visual approximation is 45%. Unable to assess diastolic function due to atrial fibrillation.    Left Atrium: Left atrium is severely dilated.    Right Ventricle: Systolic function is normal.    Right Atrium: Right atrium is moderately dilated.    Tricuspid Valve: There is mild regurgitation.    Pulmonary Artery: The estimated pulmonary artery systolic pressure is  57 mmHg.    IVC/SVC: Normal venous pressure at 3 mmHg.  . Continue Beta Blocker, Furosemide, Aldactone and ARNI and monitor clinical status closely. Monitor on telemetry. Patient is on CHF pathway.  Monitor strict Is&Os and daily weights.  Place on fluid restriction of 2 L. Cardiology has been consulted. Continue to stress to patient importance of self efficacy and  on diet for CHF. Last BNP reviewed- and noted below @LABRCNTIP(BNP,BNPTRIAGEBLO)@.     -Carvedilol 25 mg , furosemide 40 mg  BID, spironolactone  12.5 mg   -continue Entresto    -continue titrate medications to GDMT   -Discussed lifestyle modifications- diet, exercise, weight loss  -instructed to weigh self daily, notify office if > 3 pound weight gain in 1 day or 5 pounds in 1 week   -repeat Cardiac echo to evaluate heart function         Essential hypertension  -Goal BP < 130/80, medication compliant  -home  BP 130s/70-80s  -controlled   -continue medication regimen:carvedilol 25 mg, furosemide 40 mg BID   -continue HF management  -discussed lifestyle modifications- diet, exercise, weight loss   -check BP twice daily and maintain log, bring log to all appts.     Persistent atrial fibrillation  -[id number]  -continue AC therapy-apixaban 5 mg      Atrial flutter  -continue AC therapy- apixaban 5 mg     Stage 3a chronic kidney disease  -Baseline Cr 1.4, CKD clinically related to longstanding HTN   -followed by Nephrology   - Following with Urology for renal stones  - Avoid NSAIDs, increase hydration, recommend further BP control     Current use of long term anticoagulation  -continue Apixaban 5 mg     Class 2 severe obesity due to excess calories with serious comorbidity and body mass index (BMI) of 38.0 to 38.9 in adult  -encouraged weight loss  -discussed health risk associated w obesity   Body mass index is 38.05 kg/m². Morbid obesity complicates all aspects of disease management from diagnostic modalities to treatment. Weight loss  encouraged and health benefits explained to patient.       Prediabetes  -followed by PCP    MEHNAZ (obstructive sleep apnea)  -not currently on CPAP   -refer to sleep medicine to evaluate for sleep apnea update      Aortic atherosclerosis  -continue ASA and statin     Total duration of face to face visit time 30 minutes.  Total time spent counseling greater than fifty percent of total visit time.  Counseling included discussion regarding imaging findings, diagnosis, possibilities, treatment options, risks and benefits.  The patient had many questions regarding the options and long-term effects      Miki Salgado,NIKKIE  Cardiology

## 2024-06-06 NOTE — ASSESSMENT & PLAN NOTE
Patient is identified as having Combined Systolic and Diastolic heart failure that is Chronic. CHF is currently controlled. Latest ECHO performed and demonstrates- Results for orders placed during the hospital encounter of 10/02/23    Echo Saline Bubble? No    Interpretation Summary    Left Ventricle: The left ventricle is normal in size. There is concentric remodeling. Normal wall motion. There is mildly reduced systolic function. Ejection fraction by visual approximation is 45%. Unable to assess diastolic function due to atrial fibrillation.    Left Atrium: Left atrium is severely dilated.    Right Ventricle: Systolic function is normal.    Right Atrium: Right atrium is moderately dilated.    Tricuspid Valve: There is mild regurgitation.    Pulmonary Artery: The estimated pulmonary artery systolic pressure is 57 mmHg.    IVC/SVC: Normal venous pressure at 3 mmHg.  . Continue Beta Blocker, Furosemide, Aldactone and ARNI and monitor clinical status closely. Monitor on telemetry. Patient is on CHF pathway.  Monitor strict Is&Os and daily weights.  Place on fluid restriction of 2 L. Cardiology has been consulted. Continue to stress to patient importance of self efficacy and  on diet for CHF. Last BNP reviewed- and noted below @LABRCNTIP(BNP,BNPTRIAGEBLO)@.     -Carvedilol 25 mg , furosemide 40 mg  BID, spironolactone  12.5 mg   -continue Entresto    -continue titrate medications to GDMT   -Discussed lifestyle modifications- diet, exercise, weight loss  -instructed to weigh self daily, notify office if > 3 pound weight gain in 1 day or 5 pounds in 1 week   -repeat Cardiac echo to evaluate heart function

## 2024-06-06 NOTE — ASSESSMENT & PLAN NOTE
-encouraged weight loss  -discussed health risk associated w obesity   Body mass index is 38.05 kg/m². Morbid obesity complicates all aspects of disease management from diagnostic modalities to treatment. Weight loss encouraged and health benefits explained to patient.

## 2024-06-11 ENCOUNTER — OFFICE VISIT (OUTPATIENT)
Dept: ORTHOPEDICS | Facility: CLINIC | Age: 69
End: 2024-06-11
Payer: COMMERCIAL

## 2024-06-11 DIAGNOSIS — M25.551 RIGHT HIP PAIN: Primary | ICD-10-CM

## 2024-06-11 DIAGNOSIS — Z96.641 S/P TOTAL RIGHT HIP ARTHROPLASTY: ICD-10-CM

## 2024-06-11 PROCEDURE — 3288F FALL RISK ASSESSMENT DOCD: CPT | Mod: CPTII,S$GLB,, | Performed by: ORTHOPAEDIC SURGERY

## 2024-06-11 PROCEDURE — 99213 OFFICE O/P EST LOW 20 MIN: CPT | Mod: S$GLB,,, | Performed by: ORTHOPAEDIC SURGERY

## 2024-06-11 PROCEDURE — 1126F AMNT PAIN NOTED NONE PRSNT: CPT | Mod: CPTII,S$GLB,, | Performed by: ORTHOPAEDIC SURGERY

## 2024-06-11 PROCEDURE — 1160F RVW MEDS BY RX/DR IN RCRD: CPT | Mod: CPTII,S$GLB,, | Performed by: ORTHOPAEDIC SURGERY

## 2024-06-11 PROCEDURE — 3044F HG A1C LEVEL LT 7.0%: CPT | Mod: CPTII,S$GLB,, | Performed by: ORTHOPAEDIC SURGERY

## 2024-06-11 PROCEDURE — 1159F MED LIST DOCD IN RCRD: CPT | Mod: CPTII,S$GLB,, | Performed by: ORTHOPAEDIC SURGERY

## 2024-06-11 PROCEDURE — 4010F ACE/ARB THERAPY RXD/TAKEN: CPT | Mod: CPTII,S$GLB,, | Performed by: ORTHOPAEDIC SURGERY

## 2024-06-11 PROCEDURE — 1101F PT FALLS ASSESS-DOCD LE1/YR: CPT | Mod: CPTII,S$GLB,, | Performed by: ORTHOPAEDIC SURGERY

## 2024-06-11 PROCEDURE — 99999 PR PBB SHADOW E&M-EST. PATIENT-LVL III: CPT | Mod: PBBFAC,,, | Performed by: ORTHOPAEDIC SURGERY

## 2024-06-11 NOTE — PROGRESS NOTES
Subjective:      Patient ID: Juvenal Bernal is a 68 y.o. male.    Chief Complaint: Follow-up of the Right Hip (Stiffness )      HPI:  Many years postop  The patient is seen for postop follow-up of right  MESHA.  The procedure was done at another facility  Pain control has been satisfactory  They feel that they are ambulating easily  Postoperative complaints include:  Occasional anterior discomfort      Current Outpatient Medications:     apixaban (ELIQUIS) 5 mg Tab, Take 1 tablet (5 mg total) by mouth 2 (two) times daily., Disp: 60 tablet, Rfl: 3    aspirin (ECOTRIN) 81 MG EC tablet, Take 1 tablet (81 mg total) by mouth once daily., Disp: 30 tablet, Rfl: 2    atorvastatin (LIPITOR) 40 MG tablet, Take 1 tablet (40 mg total) by mouth once daily., Disp: 90 tablet, Rfl: 3    carvediloL (COREG) 25 MG tablet, Take 1 tablet (25 mg total) by mouth 2 (two) times daily with meals., Disp: 180 tablet, Rfl: 3    diclofenac sodium (VOLTAREN ARTHRITIS PAIN) 1 % Gel, Apply 4 g topically once daily. (Patient taking differently: Apply 4 g topically once daily. Not using), Disp: 350 g, Rfl: 1    furosemide (LASIX) 40 MG tablet, Take 1 tablet (40 mg total) by mouth 2 (two) times daily., Disp: 180 tablet, Rfl: 3    multivit-minerals/folic acid (MULTIVITAMIN GUMMIES ORAL), Take 1 tablet by mouth 2 (two) times a day., Disp: , Rfl:     sacubitriL-valsartan (ENTRESTO)  mg per tablet, Take 1 tablet by mouth 2 (two) times daily., Disp: 180 tablet, Rfl: 3    spironolactone (ALDACTONE) 25 MG tablet, Take 0.5 tablets (12.5 mg total) by mouth once daily., Disp: 45 tablet, Rfl: 3  Review of patient's allergies indicates:  No Known Allergies    There were no vitals taken for this visit.    Review of Systems   Constitutional: Negative for fever and weight loss.   HENT:  Negative for congestion.    Eyes:  Negative for visual disturbance.   Cardiovascular:  Negative for chest pain.   Respiratory:  Negative for shortness of breath.     Hematologic/Lymphatic: Negative for bleeding problem. Does not bruise/bleed easily.   Skin:  Negative for poor wound healing.   Gastrointestinal:  Negative for abdominal pain.   Genitourinary:  Negative for dysuria.   Neurological:  Negative for seizures.   Psychiatric/Behavioral:  Negative for altered mental status.    Allergic/Immunologic: Negative for persistent infections.           Objective:    Ortho Exam          Alert, oriented, no acute distress  Sclera-Normal  Respiratory distress-none  Gait:  No definite limp  Wound:  Cleanly healed  Range of motion:  Painless  Distal perfusion:  Intact  Distal neurologic:  Intact    Imaging:  Right hip radiographs show a well-positioned well-fixed cementless total hip replacement.  There is extensive heterotopic bone along the femoral shaft and remodeling of the proximal diaphysis      Assessment:             1. Right hip pain        Objectively, the implant is functioning normally.  There is no reasonable evidence of structural failure of the construct.  Based on the appearance of the radiographs, it is possible that the patient had a subperiosteal hematoma at the time of his surgery.  The remodeling of the femoral shaft may be caused by the stiffness of the stem.  Regardless of the etiology, the patient's current level of symptoms is very low and his mobility is not impaired.  As such, I recommend observation        Plan:       Orders Placed This Encounter    X-Ray Hip 2 or 3 views Right with Pelvis when performed     Follow up in about 1 year (around 6/11/2025).    I explained my diagnostic impression and the reasoning behind it in detail, using layman's terms.  Models and/or pictures were used to help in the explanation.    Annual x-ray

## 2024-06-18 ENCOUNTER — OFFICE VISIT (OUTPATIENT)
Dept: SLEEP MEDICINE | Facility: CLINIC | Age: 69
End: 2024-06-18
Attending: PSYCHIATRY & NEUROLOGY
Payer: MEDICARE

## 2024-06-18 VITALS
BODY MASS INDEX: 37.53 KG/M2 | WEIGHT: 262.13 LBS | HEIGHT: 70 IN | HEART RATE: 64 BPM | SYSTOLIC BLOOD PRESSURE: 109 MMHG | DIASTOLIC BLOOD PRESSURE: 66 MMHG

## 2024-06-18 DIAGNOSIS — G47.30 SLEEP APNEA, UNSPECIFIED TYPE: Primary | ICD-10-CM

## 2024-06-18 DIAGNOSIS — G47.33 OSA (OBSTRUCTIVE SLEEP APNEA): ICD-10-CM

## 2024-06-18 PROCEDURE — 99214 OFFICE O/P EST MOD 30 MIN: CPT | Mod: S$GLB,,, | Performed by: PSYCHIATRY & NEUROLOGY

## 2024-06-18 PROCEDURE — 3008F BODY MASS INDEX DOCD: CPT | Mod: CPTII,S$GLB,, | Performed by: PSYCHIATRY & NEUROLOGY

## 2024-06-18 PROCEDURE — 3078F DIAST BP <80 MM HG: CPT | Mod: CPTII,S$GLB,, | Performed by: PSYCHIATRY & NEUROLOGY

## 2024-06-18 PROCEDURE — 3288F FALL RISK ASSESSMENT DOCD: CPT | Mod: CPTII,S$GLB,, | Performed by: PSYCHIATRY & NEUROLOGY

## 2024-06-18 PROCEDURE — 4010F ACE/ARB THERAPY RXD/TAKEN: CPT | Mod: CPTII,S$GLB,, | Performed by: PSYCHIATRY & NEUROLOGY

## 2024-06-18 PROCEDURE — 99999 PR PBB SHADOW E&M-EST. PATIENT-LVL III: CPT | Mod: PBBFAC,,, | Performed by: PSYCHIATRY & NEUROLOGY

## 2024-06-18 PROCEDURE — 3074F SYST BP LT 130 MM HG: CPT | Mod: CPTII,S$GLB,, | Performed by: PSYCHIATRY & NEUROLOGY

## 2024-06-18 PROCEDURE — 1101F PT FALLS ASSESS-DOCD LE1/YR: CPT | Mod: CPTII,S$GLB,, | Performed by: PSYCHIATRY & NEUROLOGY

## 2024-06-18 PROCEDURE — 3044F HG A1C LEVEL LT 7.0%: CPT | Mod: CPTII,S$GLB,, | Performed by: PSYCHIATRY & NEUROLOGY

## 2024-06-18 NOTE — PATIENT INSTRUCTIONS
SLEEP LAB ([facility] or [facility]) will contact you to schedulethe sleep study. Their number is [phone] (ext [phone]). Please call them if you do not hear from them in 2 weeks from now.  The [facility] is located on 7th floor of the [facility]; [facility] in [facility] ( 3rd floor [facility]).    SLEEP CLINIC (my assistant) will call you when the sleep study results are ready or I will message you through the portal with the results as we have discussed - if you have not heard from us by 2 weeks from the date of the study, or you can use [facility] [place] to contact me.    Our clinic phone number is [phone] (ext 1)       You are advised to abstain from driving should you feel sleepy or drowsy.

## 2024-06-18 NOTE — PROGRESS NOTES
Juvenal Bernal is a 68 y.o. male is here to be evaluated for a sleep disorder; referred by Eli Snow,*.    Was seen in our sleep clinic in 2016 -2017 - had HST  (moderate-severe OSA_) - did not follow up    The patient reports snoring and interrupted sleep since several yeats ago .   Juvenal Bernal denied  witnessed apneas.    The patient feels rested upon awakening. Takes naps (while watchin g TV).     The patient  denies morning headaches and reports occasional  dry mouth on awakening.   Juvenal Bernal denies  nasal congestion.      The patient  denied difficulty  with falling or staying asleep.    Juvenal Bernal  denies symptoms concerning for parasomnia except for occasional somniloquy.  The patient  denies auxiliary symptoms of narcolepsy including sleep onset paralysis, hypnagogic hallucinations, sleep attacks and cataplexy.    Juvenal Bernal denied symptoms concerning for RLS; nocturnal leg movements have not been noticed.   The patient does not experience sleep related leg cramps.         Medications pertinent to sleep  disorders taken currently: -  Previous  medications taken  for sleep disorders:  -    Sleep studies  HST 2017: AHI 28, RDI 54, SaO2 min 84%        Occupation:retired 2 yrs ag        6/17/2024     4:59 PM   EPWORTH SLEEPINESS SCALE TOTAL SCORE    Total score 9             6/17/2024     4:59 PM   EPWORTH SLEEPINESS SCALE   Sitting and reading 2   Watching TV 2   Sitting, inactive in a public place (e.g. a theatre or a meeting) 0   As a passenger in a car for an hour without a break 1   Lying down to rest in the afternoon when circumstances permit 3   Sitting and talking to someone 0   Sitting quietly after a lunch without alcohol 1   In a car, while stopped for a few minutes in traffic 0   Total score 9           6/11/2024   PHQ-9 Depression Patient Health Questionnaire   Over the last two weeks how often have you been bothered by little interest or pleasure in doing  things 0    0   Over the last two weeks how often have you been bothered by feeling down, depressed or hopeless 0    0           No data to display                    6/17/2024     4:59 PM   EPWORTH SLEEPINESS SCALE   Sitting and reading 2   Watching TV 2   Sitting, inactive in a public place (e.g. a theatre or a meeting) 0   As a passenger in a car for an hour without a break 1   Lying down to rest in the afternoon when circumstances permit 3   Sitting and talking to someone 0   Sitting quietly after a lunch without alcohol 1   In a car, while stopped for a few minutes in traffic 0   Total score 9         6/17/2024     5:07 PM   Sleep Clinic New Patient   What time do you go to bed on a week day? (Give a range) 11pm-12am   What time do you go to bed on a day off? (Give a range) 11-12am   How long does it take you to fall asleep? (Give a range) 45mins-1hr   On average, how many times per night do you wake up? 2x   How long does it take you to fall back into sleep? (Give a range) 10-15mins   What time do you wake up to start your day on a week day? (Give a range) 7-7:30am   What time do you wake up to start your day on a day off? (Give a range) 7am   What time do you get out of bed? (Give a range) 7-8am   On average, how many hours do you sleep? 6hrs   On average, how many naps do you take per day? 0   Rate your sleep quality from 0 to 5 (0-poor, 5-great). 3   Have you experienced:  Weight gain?   How much weight have you lost or gained (in lbs.) in the last year? 20lbs   On average, how many times per night do you go to the bathroom?  2   Have you ever had a sleep study/CPAP machine/surgery for sleep apnea? Yes   Have you ever had a CPAP machine for sleep apnea? No   Have you ever had surgery for sleep apnea? No           6/17/2024     5:07 PM   Sleep Clinic ROS    Difficulty breathing through the nose?  No   Sore throat or dry mouth in the morning? No   Irregular or very fast heart beat?  Yes   Shortness of breath?   Yes   Acid reflux? No   Body aches and pains?  Yes   Morning headaches? No   Dizziness? Sometimes   Mood changes?  No   Do you exercise?  Yes   Do you feel like moving your legs a lot?  Yes       DME:         PAST MEDICAL HISTORY:    Active Ambulatory Problems     Diagnosis Date Noted    Essential hypertension 12/04/2015    Erectile dysfunction due to arterial insufficiency 12/04/2015    Heart murmur 12/04/2015    Obesity, Class II, BMI 35-39.9, with comorbidity 12/04/2015    Atrial flutter 12/22/2015    Soft tissue mass 12/22/2015    Mass of left thigh 01/15/2016    Lesion of skeletal muscle 02/26/2016    MEHNAZ (obstructive sleep apnea)     UTI (urinary tract infection) 09/22/2017    Elevated PSA 09/22/2017    Calcium kidney stone 09/22/2017    Kidney stone 11/22/2017    Right ureteral stone 12/22/2017    Chronic combined systolic and diastolic CHF, NYHA class 2 04/05/2023    Atrial fibrillation 04/14/2023    Bladder mass 05/05/2023    Class 2 severe obesity due to excess calories with serious comorbidity and body mass index (BMI) of 38.0 to 38.9 in adult 05/05/2023    Persistent atrial fibrillation 05/19/2023    Stage 3a chronic kidney disease 05/24/2023    Current use of long term anticoagulation 05/26/2023    Prediabetes 05/29/2023    BPH with urinary obstruction 05/29/2023    Microcytosis 05/29/2023    Pre-operative cardiovascular examination 11/30/2023    Carpal tunnel syndrome of left wrist 02/26/2024    S/P total right hip arthroplasty 03/18/2024    Right hip pain 03/18/2024    Weakness of right lower extremity 03/18/2024    Impaired mobility and activities of daily living 03/18/2024    Personal history of colonic polyps 03/22/2024    Aortic atherosclerosis 06/06/2024     Resolved Ambulatory Problems     Diagnosis Date Noted    Acute hypoxemic respiratory failure 04/01/2023    Acute exacerbation of CHF (congestive heart failure) 04/01/2023    Fever 04/01/2023    TIA (transient ischemic attack) 04/05/2023     MYLES (acute kidney injury) 04/05/2023    Encounter for medical examination to establish care 05/24/2023    Screening for malignant neoplasm of colon 03/11/2024     Past Medical History:   Diagnosis Date    Anticoagulant long-term use     Arthritis     CHF (congestive heart failure)     Hypertension     Kidney stones     Mixed hyperlipidemia                 PAST SURGICAL HISTORY:    Past Surgical History:   Procedure Laterality Date    COLONOSCOPY      COLONOSCOPY N/A 3/22/2024    Procedure: COLONOSCOPY;  Surgeon: Luz Maria Chapman MD;  Location: [facility];  Service: Endoscopy;  Laterality: N/A;    KIDNEY STONE SURGERY      LITHOTRIPSY      OTHER SURGICAL HISTORY      hematoma(dried up blood) removed from around hip area-left side    TOTAL HIP ARTHROPLASTY Right 01/01/2013         FAMILY HISTORY:                No family history on file.    SOCIAL HISTORY:          Tobacco:   Social History     Tobacco Use   Smoking Status Never    Passive exposure: Never   Smokeless Tobacco Never       alcohol use:    Social History     Substance and Sexual Activity   Alcohol Use No    Alcohol/week: 0.0 standard drinks of alcohol                   ALLERGIES:  Review of patient's allergies indicates:  No Known Allergies    CURRENT MEDICATIONS:    Current Outpatient Medications   Medication Sig Dispense Refill    apixaban (ELIQUIS) 5 mg Tab Take 1 tablet (5 mg total) by mouth 2 (two) times daily. 60 tablet 3    aspirin (ECOTRIN) 81 MG EC tablet Take 1 tablet (81 mg total) by mouth once daily. 30 tablet 2    atorvastatin (LIPITOR) 40 MG tablet Take 1 tablet (40 mg total) by mouth once daily. 90 tablet 3    carvediloL (COREG) 25 MG tablet Take 1 tablet (25 mg total) by mouth 2 (two) times daily with meals. 180 tablet 3    diclofenac sodium (VOLTAREN ARTHRITIS PAIN) 1 % Gel Apply 4 g topically once daily. (Patient taking differently: Apply 4 g topically once daily. Not using) 350 g 1    furosemide (LASIX) 40 MG tablet Take 1 tablet (40  "mg total) by mouth 2 (two) times daily. 180 tablet 3    multivit-minerals/folic acid (MULTIVITAMIN GUMMIES ORAL) Take 1 tablet by mouth 2 (two) times a day.      sacubitriL-valsartan (ENTRESTO)  mg per tablet Take 1 tablet by mouth 2 (two) times daily. 180 tablet 3    spironolactone (ALDACTONE) 25 MG tablet Take 0.5 tablets (12.5 mg total) by mouth once daily. 45 tablet 3     No current facility-administered medications for this visit.                      PHYSICAL EXAM:  /66 (BP Location: Left arm, Patient Position: Sitting, BP Method: Large (Automatic))   Pulse 64   Ht 5' 10" (1.778 m)   Wt 118.9 kg (262 lb 1.6 oz)   BMI 37.61 kg/m²   GENERAL: Normal development, well groomed.  HEENT:   HEENT:  Conjunctivae are non-erythematous; Pupils equal, round, and reactive to light; Nose is symmetrical; Nasal mucosa is pink and moist; Septum is midline; Inferior turbinates are normal; Nasal airflow is normal; Posterior pharynx is pink; Modified Mallampati:IV; Posterior palate is low; Tonsils not visualized; Uvula is  not visualized ;Tongue is normal; Dentition is fair; No TMJ tenderness; Jaw opening and protrusion without click and without discomfort.  NECK: Supple. Neck circumference is 16.5 inches. No thyromegaly. No palpable nodes.     SKIN: On face and neck: No abrasions, no rashes, no lesions.  No subcutaneous nodules are palpable.  RESPIRATORY: Chest is clear to auscultation.  Normal chest expansion and non-labored breathing at rest.  CARDIOVASCULAR: Normal S1, S2.  No murmurs, gallops or rubs. No carotid bruits bilaterally.  No edema. No clubbing. No cyanosis.    NEURO: Oriented to time, place and person. Normal attention span and concentration. Gait normal.    PSYCH: Affect is full. Mood is normal  MUSCULOSKELETAL: Moves 4 extremities. Gait normal.           ASSESSMENT:    1. Sleep Apnea NEC. The patient symptomatically has  snoring, interrupted sleep, and excessive daytime sleepiness  with exam " findings of crowded airway and elevated BMI. The patient has medical co-morbidities of atrial fibrillation  which can be worsened by MEHNAZ. This warrants further investigation for possible obstructive sleep apnea.              PLAN:    Diagnostic: Home Sleep Study. The nature of this procedure and its indication was discussed with the patient. We will notify the patient about sleep study resuts via My Chart.      During our discussion today, we talked about the etiology of  MEHNAZ as well as the potential ramifications of untreated sleep apnea, which could include daytime sleepiness, hypertension, heart disease and/or stroke.  We discussed potential treatment options, which could include weight loss, body positioning, continuous positive airway pressure (CPAP), or referral for surgical consideration. Meanwhile, he  is urged to avoid supine sleep, weight gain and alcoholic beverages since all of these can worsen MEHNAZ.     The patient was given open opportunity to ask questions and/or express concerns about treatment plan. Two point patient identifier confirmed.       Precautions: The patient was advised to abstain from driving should he feel sleepy or drowsy.    Follow up: MD after the sleep study has been completed.     ESS (Orange Sleepiness Scale) and other sleep medicine related questionnaires were reviewed with the patient.      46-minute visit. >50% spent counseling patient and coordination of care.  The patient was  cautioned against drowsy driving.

## 2024-06-19 DIAGNOSIS — I50.22 CHRONIC SYSTOLIC CONGESTIVE HEART FAILURE: ICD-10-CM

## 2024-06-19 RX ORDER — SPIRONOLACTONE 25 MG/1
12.5 TABLET ORAL
Qty: 45 TABLET | Refills: 3 | Status: SHIPPED | OUTPATIENT
Start: 2024-06-19

## 2024-06-19 RX ORDER — ATORVASTATIN CALCIUM 40 MG/1
40 TABLET, FILM COATED ORAL
Qty: 90 TABLET | Refills: 3 | Status: SHIPPED | OUTPATIENT
Start: 2024-06-19

## 2024-06-19 NOTE — TELEPHONE ENCOUNTER
No care due was identified.  [facility] Embedded Care Due Messages. Reference number: [id number].   6/19/2024 7:05:21 AM CDT

## 2024-06-19 NOTE — TELEPHONE ENCOUNTER
Refill Decision Note   Juvenal Bernal  is requesting a refill authorization.  Brief Assessment and Rationale for Refill:  Approve     Medication Therapy Plan:         Comments:     Note composed:10:14 AM 06/19/2024

## 2024-06-20 ENCOUNTER — TELEPHONE (OUTPATIENT)
Dept: SLEEP MEDICINE | Facility: OTHER | Age: 69
End: 2024-06-20
Payer: MEDICARE

## 2024-06-25 ENCOUNTER — TELEPHONE (OUTPATIENT)
Dept: CARDIOLOGY | Facility: CLINIC | Age: 69
End: 2024-06-25
Payer: MEDICARE

## 2024-06-25 NOTE — TELEPHONE ENCOUNTER
Reached out to Mr Sanford but his sister answered. I told Mrs Reynaga the results and she promised to relay the message.    ----- Message from Miki Salgado NP sent at 6/21/2024  4:51 PM CDT -----  Please inform, Mr. Bernal, your heart function has improved to within normal limits. Please continue all medication as prescribed and continue the low salt diet.    Thank you,  Miki Salgado, DNP

## 2024-06-27 DIAGNOSIS — I48.19 PERSISTENT ATRIAL FIBRILLATION: ICD-10-CM

## 2024-06-27 NOTE — TELEPHONE ENCOUNTER
Refill Routing Note   Medication(s) are not appropriate for processing by [facility] for the following reason(s):        Outside of protocol    [facility] action(s):  Route               Appointments  past 12m or future 3m with PCP    Date Provider   Last Visit   2/15/2024 Eli Snow MD   Next Visit   8/15/2024 Eli Snow MD   ED visits in past 90 days: 0        Note composed:4:26 PM 06/27/2024

## 2024-07-01 ENCOUNTER — HOSPITAL ENCOUNTER (OUTPATIENT)
Dept: SLEEP MEDICINE | Facility: HOSPITAL | Age: 69
Discharge: HOME | End: 2024-07-01
Attending: PSYCHIATRY & NEUROLOGY
Payer: MEDICARE

## 2024-07-01 DIAGNOSIS — G47.30 SLEEP APNEA, UNSPECIFIED TYPE: ICD-10-CM

## 2024-07-01 DIAGNOSIS — G47.33 OSA (OBSTRUCTIVE SLEEP APNEA): Primary | ICD-10-CM

## 2024-07-01 PROCEDURE — 95800 SLP STDY UNATTENDED: CPT

## 2024-07-03 ENCOUNTER — PATIENT MESSAGE (OUTPATIENT)
Dept: SLEEP MEDICINE | Facility: CLINIC | Age: 69
End: 2024-07-03
Payer: MEDICARE

## 2024-07-04 NOTE — PROCEDURES
Patient Name Juvenal Bernal Study Ordered By Vannessa Chisholm  Date of Night 1 07/01/2024 11:36:12 PM Date of Birth 1955  Identification Number [id number]  Overall AHI (4%)* Overall RDI % time < 90% Sp02 Mean Sp02 % time snoring > 30dB  14 32 0.2% 96.3% 3.9%  PHYSICIAN INTERPRETATION AND COMMENTS: Findings are consistent with moderate, obstructive sleep apnea (MEHNAZ)  (G47.33), by overall AHI (apnea hypopnea index). However, findings on this study suggest that the degree of sleep  disordered breathing is in the severe range, when RDI is measured. This study was technically adequate to allow for  interpretation.  CLINICAL HISTORY: 68 year old male presented with: 17.5 inch neck, BMI of 36.2, an Sophia sleepiness score of 7, and  history of hypertension, heart disease, history of stroke. Based on the clinical history, the patient has a high pre-test  probability of having Severe MEHNAZ.  SLEEP STUDY FINDINGS: Patient underwent a 1 night Home Sleep Test and by behavioral criteria, slept for approximately  6.67 hours, with a sleep latency of 4 minutes and a sleep efficiency of 95.2%. Significant sleep disordered breathing  (AHI=14) is noted based on a 4% hypopnea desaturation criteria. The patient slept supine 51.4% of the night based on valid  recording time of 6.67 hours. When considering more subtle measures of sleep disordered breathing, the overall  respiratory disturbance index is severe(RDI=32) based on a 1% hypopnea desaturation criteria with confirmation by  surrogate arousal indicators, predominantly in the supine position (39 events/hour). The apneas/hypopneas are  accompanied by minimal oxygen desaturation (percent time below 90% SpO2: 0.2%, Min SpO2: 87.2%). The average  desaturation across all sleep disordered breathing events is 2.9%. Snoring occurs for 3.9% (30 dB) of the study, 1.2% is very  loud. The mean pulse rate is 60.3 BPM, with very frequent pulse rate variability (129 events with >= 6  BPM  increase/decrease per hour).  TREATMENT CONSIDERATIONS: Consider trial of Auto-titrating CPAP 6-20 cm, mask of patient's choice, and heated  humidification. If patient has difficulty with CPAP adherence or ongoing MEHNAZ symptoms or despite CPAP adherence, then  consider an in-lab titration sleep study in order to determine optimal fixed CPAP setting. Alternatively consider oral  appliance fitted by a dentist specializing in these devices, or surgical consultation for uvulopalatopharyngoplasty (UPPP)  for treatment of obstructive sleep apnea.  DISEASE MANAGEMENT CONSIDERATIONS: Definitive treatment for MEHNAZ is recommended. Consider Sleep Clinic referral for  MEHNAZ management.  Signature: Date: 07- 18:05:21 EST  Study Review: The raw data of this KERRY study has been reviewed, with the report confirmed and electronically signed by Vannessa Chisholm, Read  and interpreted by Vannessa Chisholm MD, Diplomate, Sleep Medicine, ABPN.. Caution: The diagnosis of the Obstructive Sleep Apnea Syndrome must  be based on all available clinical data, of which this study is only a part. Thus final diagnosis and treatment recommendations should include  information from an examination of the patient by a knowledgeable healthcare provider.

## 2024-07-09 PROBLEM — Z01.810 PRE-OPERATIVE CARDIOVASCULAR EXAMINATION: Status: RESOLVED | Noted: 2023-11-30 | Resolved: 2024-07-09

## 2024-07-09 NOTE — PROGRESS NOTES
Juvenal Bernal presented for a  Medicare AWV and comprehensive Health Risk Assessment today. The following components were reviewed and updated:    Medical history  Family History  Social history  Allergies and Current Medications  Health Risk Assessment  Health Maintenance  Care Team         ** See Completed Assessments for Annual Wellness Visit within the encounter summary.**         The following assessments were completed:  Living Situation  CAGE  Depression Screening  Timed Get Up and Go  Whisper Test  Cognitive Function Screening    Nutrition Screening  ADL Screening  PAQ Screening      Opioid documentation for eAWV      Patient does not have a current opioid prescription.        Review for Substance Use Disorders: Patient does not use substance        Current Outpatient Medications:     apixaban (ELIQUIS) 5 mg Tab, Take 1 tablet (5 mg total) by mouth 2 (two) times daily., Disp: 60 tablet, Rfl: 3    aspirin (ECOTRIN) 81 MG EC tablet, Take 1 tablet (81 mg total) by mouth once daily., Disp: 30 tablet, Rfl: 2    atorvastatin (LIPITOR) 40 MG tablet, Take 1 tablet by mouth once daily, Disp: 90 tablet, Rfl: 3    carvediloL (COREG) 25 MG tablet, Take 1 tablet (25 mg total) by mouth 2 (two) times daily with meals., Disp: 180 tablet, Rfl: 3    diclofenac sodium (VOLTAREN ARTHRITIS PAIN) 1 % Gel, Apply 4 g topically once daily. (Patient taking differently: Apply 4 g topically once daily. Not using), Disp: 350 g, Rfl: 1    furosemide (LASIX) 40 MG tablet, Take 1 tablet (40 mg total) by mouth 2 (two) times daily., Disp: 180 tablet, Rfl: 3    multivit-minerals/folic acid (MULTIVITAMIN GUMMIES ORAL), Take 1 tablet by mouth 2 (two) times a day., Disp: , Rfl:     sacubitriL-valsartan (ENTRESTO)  mg per tablet, Take 1 tablet by mouth 2 (two) times daily., Disp: 180 tablet, Rfl: 3    spironolactone (ALDACTONE) 25 MG tablet, Take 1/2 (one-half) tablet by mouth once daily, Disp: 45 tablet, Rfl: 3    tamsulosin (FLOMAX)  "0.4 mg Cap, Take 1 capsule by mouth., Disp: , Rfl:        Vitals:    07/10/24 1005   BP: 92/64   Pulse: 64   SpO2: 95%   Weight: 120 kg (264 lb 8.8 oz)   Height: 5' 10" (1.778 m)   PainSc: 0-No pain      Physical Exam  Vitals and nursing note reviewed.   Constitutional:       General: He is not in acute distress.     Appearance: Normal appearance. He is obese. He is not ill-appearing.   HENT:      Head: Normocephalic and atraumatic.      Mouth/Throat:      Mouth: Mucous membranes are moist.   Eyes:      General: No scleral icterus.        Right eye: No discharge.         Left eye: No discharge.      Extraocular Movements: Extraocular movements intact.      Conjunctiva/sclera: Conjunctivae normal.   Cardiovascular:      Rate and Rhythm: Normal rate.   Pulmonary:      Effort: Pulmonary effort is normal. No respiratory distress.   Musculoskeletal:      Cervical back: Normal range of motion.   Skin:     General: Skin is warm and dry.      Findings: No rash.   Neurological:      Mental Status: He is alert and oriented to person, place, and time.   Psychiatric:         Mood and Affect: Mood normal.         Behavior: Behavior normal. Behavior is cooperative.         Cognition and Memory: Cognition and memory normal.               Diagnoses and health risks identified today and associated recommendations/orders:    1. Encounter for preventive health examination  - Chart reviewed. Problem list updated. Discussed current medical diagnosis, current medications, medical/surgical/family/social history; updated provider list; documented vital signs; identified any cognitive impairment; and updated risk factor list. Addressed any outstanding health maintenance. Provided patient with personalized health advice. Continue to follow up with PCP and any specialists.     2. Aortic atherosclerosis  Chronic; stable on current treatment plan; follow up with PCP  - taking statin     3. Chronic combined systolic and diastolic CHF, NYHA class " 2  Chronic; stable on current treatment plan; follow up with PCP  - follow up with cardiology   - taking asa, statin, coreg, lasix , and entresto, spironolactone     4. Stage 3a chronic kidney disease  Chronic; stable on current treatment plan; follow up with PCP  = recommend avoiding NSAIDs and nephrotoxins, renal dose meds  - taking ARB    5. Class 2 severe obesity due to excess calories with serious comorbidity and body mass index (BMI) of 37.0 to 37.9 in adult  - Recommendation for healthy diet and increasing exercise as tolerated with goal of 150min/week . Recommend weight loss      6. Persistent atrial fibrillation  Chronic; stable on current treatment plan; follow up with PCP  - taking eliquis and coreg    7. Essential hypertension  Chronic; stable on current treatment plan; follow up with PCP  - taking coreg, lasix, spironolactone, entresto    8. BPH with urinary obstruction  Chronic; stable on current treatment plan; follow up with PCP  - taking flomax    9. Erectile dysfunction due to arterial insufficiency  Chronic; stable on current treatment plan; follow up with PCP  =- follow up with urology     10. Prediabetes  Chronic; stable on current treatment plan; follow up with PCP  - recommend weight loss     11. MEHNAZ (obstructive sleep apnea)  Chronic; stable on current treatment plan; follow up with PCP  =- recommend weight loss      Provided Juvenal with a 5-10 year written screening schedule and personal prevention plan. Recommendations were developed using the USPSTF age appropriate recommendations. Education, counseling, and referrals were provided as needed. After Visit Summary printed and given to patient which includes a list of additional screenings\tests needed.    Follow up in about 1 year (around 7/10/2025) for your next annual wellness visit.    [facility]    Advance Care Planning     I offered to discuss advanced care planning, including how to pick a person who would make decisions  for you if you were unable to make them for yourself, called a health care power of , and what kind of decisions you might make such as use of life sustaining treatments such as ventilators and tube feeding when faced with a life limiting illness recorded on a living will that they will need to know. (How you want to be cared for as you near the end of your natural life)     X  Patient is unwilling to engage in a discussion regarding advance directives at this time.

## 2024-07-10 ENCOUNTER — OFFICE VISIT (OUTPATIENT)
Dept: FAMILY MEDICINE | Facility: CLINIC | Age: 69
End: 2024-07-10
Payer: MEDICARE

## 2024-07-10 VITALS
DIASTOLIC BLOOD PRESSURE: 64 MMHG | HEIGHT: 70 IN | HEART RATE: 64 BPM | BODY MASS INDEX: 37.87 KG/M2 | WEIGHT: 264.56 LBS | OXYGEN SATURATION: 95 % | SYSTOLIC BLOOD PRESSURE: 92 MMHG

## 2024-07-10 DIAGNOSIS — N40.1 BPH WITH URINARY OBSTRUCTION: ICD-10-CM

## 2024-07-10 DIAGNOSIS — I48.19 PERSISTENT ATRIAL FIBRILLATION: ICD-10-CM

## 2024-07-10 DIAGNOSIS — N52.01 ERECTILE DYSFUNCTION DUE TO ARTERIAL INSUFFICIENCY: ICD-10-CM

## 2024-07-10 DIAGNOSIS — N13.8 BPH WITH URINARY OBSTRUCTION: ICD-10-CM

## 2024-07-10 DIAGNOSIS — I70.0 AORTIC ATHEROSCLEROSIS: ICD-10-CM

## 2024-07-10 DIAGNOSIS — N18.31 STAGE 3A CHRONIC KIDNEY DISEASE: ICD-10-CM

## 2024-07-10 DIAGNOSIS — R73.03 PREDIABETES: ICD-10-CM

## 2024-07-10 DIAGNOSIS — I50.42 CHRONIC COMBINED SYSTOLIC AND DIASTOLIC CHF, NYHA CLASS 2: ICD-10-CM

## 2024-07-10 DIAGNOSIS — E66.01 CLASS 2 SEVERE OBESITY DUE TO EXCESS CALORIES WITH SERIOUS COMORBIDITY AND BODY MASS INDEX (BMI) OF 37.0 TO 37.9 IN ADULT: ICD-10-CM

## 2024-07-10 DIAGNOSIS — I10 ESSENTIAL HYPERTENSION: ICD-10-CM

## 2024-07-10 DIAGNOSIS — G47.33 OSA (OBSTRUCTIVE SLEEP APNEA): ICD-10-CM

## 2024-07-10 DIAGNOSIS — Z00.00 ENCOUNTER FOR PREVENTIVE HEALTH EXAMINATION: Primary | ICD-10-CM

## 2024-07-10 PROBLEM — E66.812 CLASS 2 SEVERE OBESITY DUE TO EXCESS CALORIES WITH SERIOUS COMORBIDITY AND BODY MASS INDEX (BMI) OF 37.0 TO 37.9 IN ADULT: Status: ACTIVE | Noted: 2024-07-10

## 2024-07-10 PROCEDURE — G0439 PPPS, SUBSEQ VISIT: HCPCS | Mod: S$GLB,,, | Performed by: NURSE PRACTITIONER

## 2024-07-10 PROCEDURE — 1159F MED LIST DOCD IN RCRD: CPT | Mod: CPTII,S$GLB,, | Performed by: NURSE PRACTITIONER

## 2024-07-10 PROCEDURE — 1101F PT FALLS ASSESS-DOCD LE1/YR: CPT | Mod: CPTII,S$GLB,, | Performed by: NURSE PRACTITIONER

## 2024-07-10 PROCEDURE — 3288F FALL RISK ASSESSMENT DOCD: CPT | Mod: CPTII,S$GLB,, | Performed by: NURSE PRACTITIONER

## 2024-07-10 PROCEDURE — 1126F AMNT PAIN NOTED NONE PRSNT: CPT | Mod: CPTII,S$GLB,, | Performed by: NURSE PRACTITIONER

## 2024-07-10 PROCEDURE — 1160F RVW MEDS BY RX/DR IN RCRD: CPT | Mod: CPTII,S$GLB,, | Performed by: NURSE PRACTITIONER

## 2024-07-10 PROCEDURE — 99999 PR PBB SHADOW E&M-EST. PATIENT-LVL IV: CPT | Mod: PBBFAC,,, | Performed by: NURSE PRACTITIONER

## 2024-07-10 PROCEDURE — 3044F HG A1C LEVEL LT 7.0%: CPT | Mod: CPTII,S$GLB,, | Performed by: NURSE PRACTITIONER

## 2024-07-10 PROCEDURE — 3078F DIAST BP <80 MM HG: CPT | Mod: CPTII,S$GLB,, | Performed by: NURSE PRACTITIONER

## 2024-07-10 PROCEDURE — 3074F SYST BP LT 130 MM HG: CPT | Mod: CPTII,S$GLB,, | Performed by: NURSE PRACTITIONER

## 2024-07-10 PROCEDURE — 1124F ACP DISCUSS-NO DSCNMKR DOCD: CPT | Mod: CPTII,S$GLB,, | Performed by: NURSE PRACTITIONER

## 2024-07-10 PROCEDURE — 4010F ACE/ARB THERAPY RXD/TAKEN: CPT | Mod: CPTII,S$GLB,, | Performed by: NURSE PRACTITIONER

## 2024-07-10 PROCEDURE — 1170F FXNL STATUS ASSESSED: CPT | Mod: CPTII,S$GLB,, | Performed by: NURSE PRACTITIONER

## 2024-07-10 RX ORDER — TAMSULOSIN HYDROCHLORIDE 0.4 MG/1
1 CAPSULE ORAL
COMMUNITY
Start: 2024-07-07

## 2024-07-10 NOTE — PATIENT INSTRUCTIONS
[facility] DEPARTMENT  You should receive a gift card from [facility] for completing the medicare wellness visit. You can try calling # [phone] ([facility] department) to ask about your giftcard. Please wait 1 week to call to ensure that [facility] has received documentation for proof of medicare visit.         Counseling and Referral of Other Preventative  (Italic type indicates deductible and co-insurance are waived)    Patient Name: Juvenal Bernal  Today's Date: 7/10/2024    Health Maintenance         Date Due Completion Date    COVID-19 Vaccine (5 - 2023-24 season) 07/13/2024 3/13/2024    Shingles Vaccine (2 of 2) 07/11/2024 (Originally 4/23/2024) 2/27/2024    Influenza Vaccine (1) 09/01/2024 2/27/2024    PROSTATE-SPECIFIC ANTIGEN 02/16/2025 2/16/2024    Hemoglobin A1c (Prediabetes) 02/16/2025 2/16/2024    Colorectal Cancer Screening 03/22/2026 3/22/2024    TETANUS VACCINE 06/07/2027 6/7/2017    Lipid Panel 02/16/2029 2/16/2024          No orders of the defined types were placed in this encounter.      The following information is provided to all patients.  This information is to help you find resources for any of the problems found today that may be affecting your health:                  Living healthy guide: www.[facility].[place].gov      Understanding Diabetes: www.diabetes.org      Eating healthy: www.cdc.gov/healthyweight      CDC home safety checklist: www.cdc.gov/steadi/patient.html      Agency on Aging: www.goea.[place].gov      Alcoholics anonymous (AA): www.aa.org      Physical Activity: www.kwabena.nih.gov/ax1zxxa      Tobacco use: www.quitwithusla.org

## 2024-07-16 ENCOUNTER — PATIENT MESSAGE (OUTPATIENT)
Dept: SLEEP MEDICINE | Facility: CLINIC | Age: 69
End: 2024-07-16
Payer: MEDICARE

## 2024-07-16 DIAGNOSIS — G47.33 OSA (OBSTRUCTIVE SLEEP APNEA): Primary | ICD-10-CM

## 2024-08-29 ENCOUNTER — PATIENT MESSAGE (OUTPATIENT)
Dept: CARDIOLOGY | Facility: CLINIC | Age: 69
End: 2024-08-29
Payer: MEDICARE

## 2024-09-06 ENCOUNTER — OFFICE VISIT (OUTPATIENT)
Dept: CARDIOLOGY | Facility: CLINIC | Age: 69
End: 2024-09-06
Payer: MEDICARE

## 2024-09-06 VITALS
DIASTOLIC BLOOD PRESSURE: 72 MMHG | HEIGHT: 70 IN | SYSTOLIC BLOOD PRESSURE: 118 MMHG | BODY MASS INDEX: 38.03 KG/M2 | WEIGHT: 265.63 LBS | HEART RATE: 66 BPM | OXYGEN SATURATION: 95 %

## 2024-09-06 DIAGNOSIS — I50.42 CHRONIC COMBINED SYSTOLIC AND DIASTOLIC CHF, NYHA CLASS 2: Primary | ICD-10-CM

## 2024-09-06 DIAGNOSIS — I10 ESSENTIAL HYPERTENSION: ICD-10-CM

## 2024-09-06 DIAGNOSIS — I48.3 TYPICAL ATRIAL FLUTTER: ICD-10-CM

## 2024-09-06 DIAGNOSIS — I70.0 AORTIC ATHEROSCLEROSIS: ICD-10-CM

## 2024-09-06 DIAGNOSIS — Z79.01 CURRENT USE OF LONG TERM ANTICOAGULATION: ICD-10-CM

## 2024-09-06 DIAGNOSIS — E66.01 CLASS 2 SEVERE OBESITY DUE TO EXCESS CALORIES WITH SERIOUS COMORBIDITY AND BODY MASS INDEX (BMI) OF 38.0 TO 38.9 IN ADULT: ICD-10-CM

## 2024-09-06 DIAGNOSIS — G47.33 OSA (OBSTRUCTIVE SLEEP APNEA): ICD-10-CM

## 2024-09-06 DIAGNOSIS — R73.03 PREDIABETES: ICD-10-CM

## 2024-09-06 DIAGNOSIS — I48.19 PERSISTENT ATRIAL FIBRILLATION: ICD-10-CM

## 2024-09-06 DIAGNOSIS — N18.31 STAGE 3A CHRONIC KIDNEY DISEASE: ICD-10-CM

## 2024-09-06 PROCEDURE — 99999 PR PBB SHADOW E&M-EST. PATIENT-LVL III: CPT | Mod: PBBFAC,,,

## 2024-09-06 NOTE — ASSESSMENT & PLAN NOTE
Patient is identified as having Combined Systolic and Diastolic heart failure that is Chronic. CHF is currently controlled. Latest ECHO performed and demonstrates- Results for orders placed during the hospital encounter of 06/20/24    Echo Saline Bubble? No; Ultrasound enhancing contrast? Yes    Interpretation Summary    Left Ventricle: The left ventricle is normal in size. There is concentric remodeling. There is normal systolic function with a visually estimated ejection fraction of 55 - 60%. Unable to assess diastolic function due to atrial fibrillation.    Right Ventricle: Mild right ventricular enlargement. Systolic function is mildly reduced.    Left Atrium: Left atrium is moderately dilated.    Right Atrium: Right atrium is moderately dilated.    Aortic Valve: The aortic valve is a trileaflet valve. There is mild aortic valve sclerosis. There is mild aortic regurgitation.    Mitral Valve: There is moderate mitral annular calcification present.    Tricuspid Valve: There is mild regurgitation.    Pulmonary Artery: There is pulmonary hypertension. The estimated pulmonary artery systolic pressure is 52 mmHg.    IVC/SVC: Normal venous pressure at 3 mmHg.    Pericardium: There is a trivial effusion.  . Continue Beta Blocker, Furosemide, Aldactone, and ARNI and monitor clinical status closely. Monitor on telemetry. Patient is on CHF pathway.  Monitor strict Is&Os and daily weights.  Place on fluid restriction of 2 L. Cardiology has been consulted. Continue to stress to patient importance of self efficacy and  on diet for CHF. Last BNP reviewed- and noted below @LABRCNTIP(BNP,BNPTRIAGEBLO)@    Carvedilol 25 mg , furosemide 40 mg  BID, spironolactone  12.5 mg, continue Entresto    -continue titrate medications to GDMT   -Discussed lifestyle modifications- diet, exercise, weight loss  -instructed to weigh self daily, notify office if > 3 pound weight gain in 1 day or 5 pounds in 1 week

## 2024-09-06 NOTE — PROGRESS NOTES
Subjective:    Patient ID:  Juvenal Bernal is a 69 y.o. male who presents for follow-up of No chief complaint on file.      PCP: Eli Snow MD     Referring Provider:     HPI: Patient is a 66 yo M w/H of HTN, A-fib, HFrEF ( LVEF 30-35%, improved to 55-60% ), MEHNAZ, obesity who presents today for f/u appt. He was last seen on 6/6/24 for f/u, HF management and was continued on medical therapy w max dose Entresto. Renal function remains stable. Repeat cardiac echo w LVEF improved to 55-60%. Patient denies CP, SOB, palpitations, orthopnea, PND, pre-syncope, LOC, swelling, or claudication. He notes snoring. Patient notes medication compliance without side effects.  He does monitor his home BP and now ranges 130s/ 70-80s. Patient also notes dietary noncompliance with low salt diet. He walks daily. He reports slight improvement in GUERRERO.       Past Medical History:   Diagnosis Date    Anticoagulant long-term use     Arthritis     right hip    Atrial fibrillation     CHF (congestive heart failure)     Hypertension     Kidney stones     Mixed hyperlipidemia     Personal history of colonic polyps 03/22/2024     Past Surgical History:   Procedure Laterality Date    COLONOSCOPY      COLONOSCOPY N/A 03/22/2024    Procedure: COLONOSCOPY;  Surgeon: Luz Maria Chapman MD;  Location: [facility];  Service: Endoscopy;  Laterality: N/A;    KIDNEY STONE SURGERY      at [place]    LITHOTRIPSY      OTHER SURGICAL HISTORY      hematoma(dried up blood) removed from around hip area-left side    TOTAL HIP ARTHROPLASTY Right 01/01/2013     Social History     Socioeconomic History    Marital status:    Tobacco Use    Smoking status: Never     Passive exposure: Never    Smokeless tobacco: Never   Substance and Sexual Activity    Alcohol use: No     Alcohol/week: 0.0 standard drinks of alcohol    Drug use: Never    Sexual activity: Yes     Partners: Female     Social Determinants of Health     Financial Resource Strain: Low  Risk  (7/10/2024)    Overall Financial Resource Strain (CARDIA)     Difficulty of Paying Living Expenses: Not hard at all   Food Insecurity: No Food Insecurity (7/10/2024)    Hunger Vital Sign     Worried About Running Out of Food in the Last Year: Never true     Ran Out of Food in the Last Year: Never true   Transportation Needs: No Transportation Needs (7/10/2024)    PRAPARE - Transportation     Lack of Transportation (Medical): No     Lack of Transportation (Non-Medical): No   Physical Activity: Sufficiently Active (7/10/2024)    Exercise Vital Sign     Days of Exercise per Week: 4 days     Minutes of Exercise per Session: 60 min   Stress: No Stress Concern Present (7/10/2024)    [employer] [place] [employer] - Occupational Stress Questionnaire     Feeling of Stress : Not at all   Housing Stability: Low Risk  (7/10/2024)    Housing Stability Vital Sign     Unable to Pay for Housing in the Last Year: No     Homeless in the Last Year: No     Family History   Problem Relation Name Age of Onset    No Known Problems Mother      No Known Problems Father estranged     No Known Problems Sister x4     Chronic back pain Brother x2     Parkinsonism Brother x2     No Known Problems Daughter x3     No Known Problems Son x1        Review of patient's allergies indicates:  No Known Allergies    Medication List with Changes/Refills   Current Medications    APIXABAN (ELIQUIS) 5 MG TAB    Take 1 tablet (5 mg total) by mouth 2 (two) times daily.    ASPIRIN (ECOTRIN) 81 MG EC TABLET    Take 1 tablet (81 mg total) by mouth once daily.    ATORVASTATIN (LIPITOR) 40 MG TABLET    Take 1 tablet by mouth once daily    CARVEDILOL (COREG) 25 MG TABLET    Take 1 tablet (25 mg total) by mouth 2 (two) times daily with meals.    DICLOFENAC SODIUM (VOLTAREN ARTHRITIS PAIN) 1 % GEL    Apply 4 g topically once daily.    FUROSEMIDE (LASIX) 40 MG TABLET    Take 1 tablet (40 mg total) by mouth 2 (two) times daily.    MULTIVIT-MINERALS/FOLIC  "ACID (MULTIVITAMIN GUMMIES ORAL)    Take 1 tablet by mouth 2 (two) times a day.    SACUBITRIL-VALSARTAN (ENTRESTO)  MG PER TABLET    Take 1 tablet by mouth 2 (two) times daily.    SPIRONOLACTONE (ALDACTONE) 25 MG TABLET    Take 1/2 (one-half) tablet by mouth once daily    TAMSULOSIN (FLOMAX) 0.4 MG CAP    Take 1 capsule by mouth.       Review of Systems   Constitutional: Negative for diaphoresis and fever.   HENT:  Negative for congestion and hearing loss.    Eyes:  Negative for blurred vision and pain.   Cardiovascular:  Negative for chest pain, claudication, dyspnea on exertion, near-syncope, palpitations and syncope.   Respiratory:  Negative for shortness of breath and sleep disturbances due to breathing.    Hematologic/Lymphatic: Negative for bleeding problem. Does not bruise/bleed easily.   Skin:  Negative for color change and poor wound healing.   Gastrointestinal:  Negative for abdominal pain and nausea.   Genitourinary:  Negative for bladder incontinence and flank pain.   Neurological:  Negative for focal weakness and light-headedness.        Objective:   /72 (BP Location: Left arm, Patient Position: Sitting, BP Method: Large (Automatic))   Pulse 66   Ht 5' 10" (1.778 m)   Wt 120.5 kg (265 lb 10.5 oz)   SpO2 95%   BMI 38.12 kg/m²    Physical Exam  Constitutional:       Appearance: He is well-developed. He is not diaphoretic.   HENT:      Head: Normocephalic and atraumatic.   Eyes:      General: No scleral icterus.     Pupils: Pupils are equal, round, and reactive to light.   Neck:      Vascular: No JVD.   Cardiovascular:      Rate and Rhythm: Normal rate. Rhythm irregular.      Pulses: Intact distal pulses.           Radial pulses are 2+ on the right side and 2+ on the left side.        Dorsalis pedis pulses are 2+ on the right side and 2+ on the left side.        Posterior tibial pulses are 2+ on the right side and 2+ on the left side.      Heart sounds: S1 normal and S2 normal. Murmur " heard.      No friction rub. No gallop.   Pulmonary:      Effort: Pulmonary effort is normal. No respiratory distress.      Breath sounds: Normal breath sounds. No wheezing or rales.   Chest:      Chest wall: No tenderness.   Abdominal:      General: Bowel sounds are normal. There is no distension.      Palpations: Abdomen is soft. There is no mass.      Tenderness: There is no abdominal tenderness. There is no rebound.   Musculoskeletal:         General: No tenderness. Normal range of motion.      Cervical back: Normal range of motion and neck supple.   Skin:     General: Skin is warm and dry.      Coloration: Skin is not pale.   Neurological:      Mental Status: He is alert and oriented to person, place, and time.      Coordination: Coordination normal.      Deep Tendon Reflexes: Reflexes normal.   Psychiatric:         Behavior: Behavior normal.         Judgment: Judgment normal.           Cardiac echo 6/20/24  Summary    Left Ventricle: The left ventricle is normal in size. There is concentric remodeling. There is normal systolic function with a visually estimated ejection fraction of 55 - 60%. Unable to assess diastolic function due to atrial fibrillation.    Right Ventricle: Mild right ventricular enlargement. Systolic function is mildly reduced.    Left Atrium: Left atrium is moderately dilated.    Right Atrium: Right atrium is moderately dilated.    Aortic Valve: The aortic valve is a trileaflet valve. There is mild aortic valve sclerosis. There is mild aortic regurgitation.    Mitral Valve: There is moderate mitral annular calcification present.    Tricuspid Valve: There is mild regurgitation.    Pulmonary Artery: There is pulmonary hypertension. The estimated pulmonary artery systolic pressure is 52 mmHg.    IVC/SVC: Normal venous pressure at 3 mmHg.    Pericardium: There is a trivial effusion.     Cardiac echo 10/2/23  Summary    Left Ventricle: The left ventricle is normal in size. There is concentric  remodeling. Normal wall motion. There is mildly reduced systolic function. Ejection fraction by visual approximation is 45%. Unable to assess diastolic function due to atrial fibrillation.    Left Atrium: Left atrium is severely dilated.    Right Ventricle: Systolic function is normal.    Right Atrium: Right atrium is moderately dilated.    Tricuspid Valve: There is mild regurgitation.    Pulmonary Artery: The estimated pulmonary artery systolic pressure is 57 mmHg.    IVC/SVC: Normal venous pressure at 3 mmHg.    EKG 4/5/23- reviewed  A-Fib w T wave abnormality, HR 70    Cardiac echo 4/3/23-reviewed   Summary  The left ventricle is mildly enlarged with marked concentric hypertrophy and moderately decreased systolic function. The estimated ejection fraction is 30-35%.  There is an increased density to the thickened LV myocardium suggestive of an infiltrative process (e.g., amyloidosis)  Normal right ventricular size with normal right ventricular systolic function.  Severe biatrial enlargement.  Trivial pericardial effusion.  The pulmonary artery systolic pressure could not be estimated due to the lack of tricuspid insufficiency.  Normal central venous pressure (3 mmHg).  Atrial fibrillation observed    Assessment:       1. Chronic combined systolic and diastolic CHF, NYHA class 2    2. Essential hypertension    3. Persistent atrial fibrillation    4. Typical atrial flutter    5. Aortic atherosclerosis    6. Stage 3a chronic kidney disease    7. Current use of long term anticoagulation    8. Class 2 severe obesity due to excess calories with serious comorbidity and body mass index (BMI) of 38.0 to 38.9 in adult    9. Prediabetes    10. MEHNAZ (obstructive sleep apnea)         Plan:         Chronic combined systolic and diastolic CHF, NYHA class 2  Patient is identified as having Combined Systolic and Diastolic heart failure that is Chronic. CHF is currently controlled. Latest ECHO performed and demonstrates- Results for  orders placed during the hospital encounter of 06/20/24    Echo Saline Bubble? No; Ultrasound enhancing contrast? Yes    Interpretation Summary    Left Ventricle: The left ventricle is normal in size. There is concentric remodeling. There is normal systolic function with a visually estimated ejection fraction of 55 - 60%. Unable to assess diastolic function due to atrial fibrillation.    Right Ventricle: Mild right ventricular enlargement. Systolic function is mildly reduced.    Left Atrium: Left atrium is moderately dilated.    Right Atrium: Right atrium is moderately dilated.    Aortic Valve: The aortic valve is a trileaflet valve. There is mild aortic valve sclerosis. There is mild aortic regurgitation.    Mitral Valve: There is moderate mitral annular calcification present.    Tricuspid Valve: There is mild regurgitation.    Pulmonary Artery: There is pulmonary hypertension. The estimated pulmonary artery systolic pressure is 52 mmHg.    IVC/SVC: Normal venous pressure at 3 mmHg.    Pericardium: There is a trivial effusion.  . Continue Beta Blocker, Furosemide, Aldactone, and ARNI and monitor clinical status closely. Monitor on telemetry. Patient is on CHF pathway.  Monitor strict Is&Os and daily weights.  Place on fluid restriction of 2 L. Cardiology has been consulted. Continue to stress to patient importance of self efficacy and  on diet for CHF. Last BNP reviewed- and noted below @LABRCNTIP(BNP,BNPTRIAGEBLO)@    Carvedilol 25 mg , furosemide 40 mg  BID, spironolactone  12.5 mg, continue Entresto    -continue titrate medications to GDMT   -Discussed lifestyle modifications- diet, exercise, weight loss  -instructed to weigh self daily, notify office if > 3 pound weight gain in 1 day or 5 pounds in 1 week     Essential hypertension  -Goal BP < 130/80, medication compliant  -home  BP 130s/70-80s  -controlled- 118/72  -continue medication regimen:carvedilol 25 mg, furosemide 40 mg BID   -continue HF  management  -discussed lifestyle modifications- diet, exercise, weight loss   -check BP twice daily and maintain log, bring log to all appts.     Persistent atrial fibrillation  -[id number]  -continue AC therapy-apixaban 5 mg      Atrial flutter  -continue AC therapy- apixaban 5 mg     Atrial fibrillation  -[id number]- [id number]  -continue AC therapy-apixaban 5 mg (patient has received funding)       Aortic atherosclerosis  -continue ASA and statin     Stage 3a chronic kidney disease  -Baseline Cr 1.4, CKD clinically related to longstanding HTN   -followed by Nephrology   - Following with Urology for renal stones  - Avoid NSAIDs, increase hydration, recommend further BP control     Current use of long term anticoagulation  -continue Apixaban 5 mg     Class 2 severe obesity due to excess calories with serious comorbidity and body mass index (BMI) of 38.0 to 38.9 in adult  -encouraged weight loss  -discussed health risk associated w obesity   Body mass index is 38.12 kg/m². Morbid obesity complicates all aspects of disease management from diagnostic modalities to treatment. Weight loss encouraged and health benefits explained to patient.       Prediabetes  -followed by PCP    MEHNAZ (obstructive sleep apnea)  -not currently on CPAP   -refer to sleep medicine to evaluate for sleep apnea update        Total duration of face to face visit time 30 minutes.  Total time spent counseling greater than fifty percent of total visit time.  Counseling included discussion regarding imaging findings, diagnosis, possibilities, treatment options, risks and benefits.  The patient had many questions regarding the options and long-term effects      Miki Salgado,NIKKIE  Cardiology

## 2024-09-06 NOTE — ASSESSMENT & PLAN NOTE
-encouraged weight loss  -discussed health risk associated w obesity   Body mass index is 38.12 kg/m². Morbid obesity complicates all aspects of disease management from diagnostic modalities to treatment. Weight loss encouraged and health benefits explained to patient.

## 2024-09-06 NOTE — ASSESSMENT & PLAN NOTE
-Goal BP < 130/80, medication compliant  -home  BP 130s/70-80s  -controlled- 118/72  -continue medication regimen:carvedilol 25 mg, furosemide 40 mg BID   -continue HF management  -discussed lifestyle modifications- diet, exercise, weight loss   -check BP twice daily and maintain log, bring log to all appts.

## 2024-09-12 DIAGNOSIS — I50.42 CHRONIC COMBINED SYSTOLIC AND DIASTOLIC CHF, NYHA CLASS 2: Primary | ICD-10-CM

## 2024-09-12 DIAGNOSIS — I10 ESSENTIAL HYPERTENSION: ICD-10-CM

## 2024-09-12 DIAGNOSIS — R73.03 PREDIABETES: ICD-10-CM

## 2024-09-12 DIAGNOSIS — I70.0 AORTIC ATHEROSCLEROSIS: ICD-10-CM

## 2024-09-13 ENCOUNTER — OFFICE VISIT (OUTPATIENT)
Dept: FAMILY MEDICINE | Facility: CLINIC | Age: 69
End: 2024-09-13
Payer: MEDICARE

## 2024-09-13 VITALS
WEIGHT: 263.31 LBS | SYSTOLIC BLOOD PRESSURE: 100 MMHG | DIASTOLIC BLOOD PRESSURE: 80 MMHG | OXYGEN SATURATION: 97 % | HEART RATE: 55 BPM | BODY MASS INDEX: 37.7 KG/M2 | HEIGHT: 70 IN

## 2024-09-13 DIAGNOSIS — R73.03 PREDIABETES: ICD-10-CM

## 2024-09-13 DIAGNOSIS — R25.8 INVOLUNTARY JERKY MOVEMENTS: Primary | ICD-10-CM

## 2024-09-13 DIAGNOSIS — N18.31 STAGE 3A CHRONIC KIDNEY DISEASE: ICD-10-CM

## 2024-09-13 DIAGNOSIS — E66.01 CLASS 2 SEVERE OBESITY DUE TO EXCESS CALORIES WITH SERIOUS COMORBIDITY AND BODY MASS INDEX (BMI) OF 37.0 TO 37.9 IN ADULT: ICD-10-CM

## 2024-09-13 DIAGNOSIS — I50.42 CHRONIC COMBINED SYSTOLIC AND DIASTOLIC CHF, NYHA CLASS 2: ICD-10-CM

## 2024-09-13 DIAGNOSIS — I48.19 PERSISTENT ATRIAL FIBRILLATION: ICD-10-CM

## 2024-09-13 DIAGNOSIS — G47.33 OSA (OBSTRUCTIVE SLEEP APNEA): ICD-10-CM

## 2024-09-13 DIAGNOSIS — I10 ESSENTIAL HYPERTENSION: ICD-10-CM

## 2024-09-13 DIAGNOSIS — G25.9 EXTRAPYRAMIDAL AND MOVEMENT DISORDER, UNSPECIFIED: ICD-10-CM

## 2024-09-13 PROCEDURE — 99999 PR PBB SHADOW E&M-EST. PATIENT-LVL V: CPT | Mod: PBBFAC,,, | Performed by: STUDENT IN AN ORGANIZED HEALTH CARE EDUCATION/TRAINING PROGRAM

## 2024-09-13 NOTE — PROGRESS NOTES
Subjective:   Chief complaints:    F/u on chronic conditions  Jerky movement of head and bilateral hands    HPI  68 yo M with prediabetes, microcytosis, CKD stage 3a, sHTN, A-fib, HFrEF ( now recovered LVEF 65% in 06/2024), OA right hip s/p replacement ( 2012) , MEHNAZ, obesity, prior TIA and CKD stage 3a with recent incidental finding of  benign bladder mass presents for f/u on chronic conditions in addition c/o intermittent sudden jerky movement of his head to one side and bilateral hand tremors in the past couple of months, his relatives also noticed it especially when he is having conversation wth them, he reports no preceding head trauma, falls, LOC, tongue biting, he is always aware when it happens, no changes in his gait , no chronic use of ETOH. He has been compliant with all his medications .  He also described his mood to be great today.    Past Medical History:   Diagnosis Date    Anticoagulant long-term use     Arthritis     right hip    Atrial fibrillation     CHF (congestive heart failure)     Hypertension     Kidney stones     Mixed hyperlipidemia     Personal history of colonic polyps 03/22/2024     Past Surgical History:   Procedure Laterality Date    COLONOSCOPY      COLONOSCOPY N/A 03/22/2024    Procedure: COLONOSCOPY;  Surgeon: Luz Maria Chapman MD;  Location: [facility];  Service: Endoscopy;  Laterality: N/A;    KIDNEY STONE SURGERY      at [place]    LITHOTRIPSY      OTHER SURGICAL HISTORY      hematoma(dried up blood) removed from around hip area-left side    TOTAL HIP ARTHROPLASTY Right 01/01/2013     Social History     Socioeconomic History    Marital status:    Tobacco Use    Smoking status: Never     Passive exposure: Never    Smokeless tobacco: Never   Substance and Sexual Activity    Alcohol use: No     Alcohol/week: 0.0 standard drinks of alcohol    Drug use: Never    Sexual activity: Yes     Partners: Female     Social Determinants of Health     Financial Resource Strain: Low  Risk  (7/10/2024)    Overall Financial Resource Strain (CARDIA)     Difficulty of Paying Living Expenses: Not hard at all   Food Insecurity: No Food Insecurity (7/10/2024)    Hunger Vital Sign     Worried About Running Out of Food in the Last Year: Never true     Ran Out of Food in the Last Year: Never true   Transportation Needs: No Transportation Needs (7/10/2024)    PRAPARE - Transportation     Lack of Transportation (Medical): No     Lack of Transportation (Non-Medical): No   Physical Activity: Sufficiently Active (7/10/2024)    Exercise Vital Sign     Days of Exercise per Week: 4 days     Minutes of Exercise per Session: 60 min   Stress: No Stress Concern Present (7/10/2024)    [employer] - Occupational Stress Questionnaire     Feeling of Stress : Not at all   Housing Stability: Low Risk  (7/10/2024)    Housing Stability Vital Sign     Unable to Pay for Housing in the Last Year: No     Homeless in the Last Year: No     Family History   Problem Relation Name Age of Onset    No Known Problems Mother      No Known Problems Father estranged     No Known Problems Sister x4     Chronic back pain Brother x2     Parkinsonism Brother x2     No Known Problems Daughter x3     No Known Problems Son x1        Review of patient's allergies indicates:  No Known Allergies    Medication List with Changes/Refills   Current Medications    APIXABAN (ELIQUIS) 5 MG TAB    Take 1 tablet (5 mg total) by mouth 2 (two) times daily.    ASPIRIN (ECOTRIN) 81 MG EC TABLET    Take 1 tablet (81 mg total) by mouth once daily.    ATORVASTATIN (LIPITOR) 40 MG TABLET    Take 1 tablet by mouth once daily    CARVEDILOL (COREG) 25 MG TABLET    Take 1 tablet (25 mg total) by mouth 2 (two) times daily with meals.    DICLOFENAC SODIUM (VOLTAREN ARTHRITIS PAIN) 1 % GEL    Apply 4 g topically once daily.    FUROSEMIDE (LASIX) 40 MG TABLET    Take 1 tablet (40 mg total) by mouth 2 (two) times daily.    MULTIVIT-MINERALS/FOLIC  "ACID (MULTIVITAMIN GUMMIES ORAL)    Take 1 tablet by mouth 2 (two) times a day.    SACUBITRIL-VALSARTAN (ENTRESTO)  MG PER TABLET    Take 1 tablet by mouth 2 (two) times daily.    SPIRONOLACTONE (ALDACTONE) 25 MG TABLET    Take 1/2 (one-half) tablet by mouth once daily    TAMSULOSIN (FLOMAX) 0.4 MG CAP    Take 1 capsule by mouth.       Review of Systems   Constitutional: Negative for diaphoresis and fever.   HENT:  Negative for congestion and hearing loss.    Eyes:  Negative for blurred vision and pain.   Cardiovascular:  Positive for leg swelling. Negative for chest pain, claudication, dyspnea on exertion, near-syncope, palpitations and syncope.   Respiratory:  Negative for shortness of breath and sleep disturbances due to breathing.    Hematologic/Lymphatic: Negative for bleeding problem. Does not bruise/bleed easily.   Skin:  Negative for color change and poor wound healing.   Musculoskeletal:  Positive for arthritis.   Gastrointestinal:  Negative for abdominal pain and nausea.   Genitourinary:  Negative for bladder incontinence and flank pain.   Neurological:  Negative for focal weakness and light-headedness.        Objective:   /80 (BP Location: Right arm, Patient Position: Sitting, BP Method: Large (Manual))   Pulse (!) 55   Ht 5' 10" (1.778 m)   Wt 119.4 kg (263 lb 5.4 oz)   SpO2 97%   BMI 37.79 kg/m²    Physical Exam  Vitals reviewed.   Constitutional:       General: He is not in acute distress.     Appearance: He is well-developed. He is not diaphoretic.   HENT:      Head: Normocephalic and atraumatic.      Mouth/Throat:      Mouth: Mucous membranes are moist.      Pharynx: Oropharynx is clear.   Eyes:      General: No scleral icterus.     Pupils: Pupils are equal, round, and reactive to light.   Neck:      Vascular: No JVD.   Cardiovascular:      Rate and Rhythm: Bradycardia present. Rhythm irregular.      Pulses: Intact distal pulses.           Radial pulses are 2+ on the right side and " 2+ on the left side.        Dorsalis pedis pulses are 2+ on the right side and 2+ on the left side.        Posterior tibial pulses are 2+ on the right side and 2+ on the left side.      Heart sounds: S1 normal and S2 normal. No murmur heard.     No friction rub. No gallop.   Pulmonary:      Effort: Pulmonary effort is normal. No respiratory distress.      Breath sounds: Normal breath sounds. No wheezing or rales.   Chest:      Chest wall: No tenderness.   Abdominal:      General: Bowel sounds are normal. There is no distension.      Palpations: Abdomen is soft. There is no mass.      Tenderness: There is no abdominal tenderness. There is no rebound.   Musculoskeletal:         General: No tenderness. Normal range of motion.      Cervical back: Normal range of motion and neck supple.      Right lower leg: No edema (Trace bilaterally).   Skin:     General: Skin is warm and dry.      Coloration: Skin is not pale.   Neurological:      General: No focal deficit present.      Mental Status: He is alert and oriented to person, place, and time.      Motor: No weakness.      Coordination: Coordination normal.      Gait: Gait normal.      Deep Tendon Reflexes: Reflexes normal.   Psychiatric:         Mood and Affect: Mood normal.         Behavior: Behavior normal.         Judgment: Judgment normal.           EKG 4/5/23- reviewed  A-Fib w T wave abnormality, HR 70      ECHO 06/2024    Left Ventricle: The left ventricle is normal in size. There is concentric remodeling. There is normal systolic function with a visually estimated ejection fraction of 55 - 60%. Unable to assess diastolic function due to atrial fibrillation.    Right Ventricle: Mild right ventricular enlargement. Systolic function is mildly reduced.    Left Atrium: Left atrium is moderately dilated.    Right Atrium: Right atrium is moderately dilated.    Aortic Valve: The aortic valve is a trileaflet valve. There is mild aortic valve sclerosis. There is mild aortic  regurgitation.    Mitral Valve: There is moderate mitral annular calcification present.    Tricuspid Valve: There is mild regurgitation.    Pulmonary Artery: There is pulmonary hypertension. The estimated pulmonary artery systolic pressure is 52 mmHg.    IVC/SVC: Normal venous pressure at 3 mmHg.    Pericardium: There is a trivial effusion.    Assessment:       1. Involuntary jerky movements    2. Extrapyramidal and movement disorder, unspecified    3. Chronic combined systolic and diastolic CHF, NYHA class 2    4. Essential hypertension    5. Stage 3a chronic kidney disease    6. Persistent atrial fibrillation    7. MEHNAZ (obstructive sleep apnea)    8. Prediabetes    9. Class 2 severe obesity due to excess calories with serious comorbidity and body mass index (BMI) of 37.0 to 37.9 in adult                 Plan:     -will check his B12, RPR today  -HIV , TSH WNL  -MRI brain ordered to r/o structural brain abn with the brain in the setting of new onset extrapyramidal symptoms  -CTH in 4/2023 with involutional and chronic microvascular ischemic changes and brain volume loss  -c/w other regimen as prescribed  -counseled on life style modifications (low salt diet <2g, <2L fluid intake)  -c/w daily exercise (30 minutes-45 minutes daily), healthy eating habit encouraged  -medication reconciliation done with patient and patient verbalized understanding  -due for flu, second shot of shingles and COVID as well    40 minutes of total time spent on the encounter, which includes face to face time and non-face to face time preparing to see the patient (eg, review of tests), Obtaining and/or reviewing separately obtained history, Documenting clinical information in the electronic or other health record, Independently interpreting results (not separately reported) and communicating results to the patient or Care coordination (not separately reported).      Dr Eli Snow MD  Internal Medicine  [facility]

## 2024-09-13 NOTE — PATIENT INSTRUCTIONS
Please get your second shot of shingles, flu and the new covid vaccine from any local pharmacy of your choice

## 2024-09-17 ENCOUNTER — NURSE TRIAGE (OUTPATIENT)
Dept: ADMINISTRATIVE | Facility: CLINIC | Age: 69
End: 2024-09-17
Payer: MEDICARE

## 2024-09-17 NOTE — TELEPHONE ENCOUNTER
Pt reports he had 2 immunizations today: shingles and influenza. Pt reports 1 hour after receiving vaccines he began to have tremors to BUE and BLE. Pt also reports he has the urge to have a bowel movement and rectal fullness but is not able to have a BM. Last BM this morning. Pt denies SOB, fever, chills, weakness, confusion, difficulty swallowing, N/V, abdominal pain. Care advice home care. Pt verbalizes understanding.   Reason for Disposition   [1] Rectal pain or fullness from fecal impaction (rectum full of stool) AND [2] has not tried SITZ bath, suppository or enema   Influenza (Injection; Quadrivalent or Trivalent) injected vaccine reactions   Shingles (Herpes zoster; Shingrix) vaccine reactions    Additional Information   Negative: [1] Vomiting AND [2] contains bile (green color)   Negative: Patient sounds very sick or weak to the triager   Negative: [1] Vomiting AND [2] abdomen looks much more swollen than usual   Negative: [1] Constant abdominal pain AND [2] present > 2 hours   Negative: [1] Rectal pain or fullness from fecal impaction (rectum full of stool) AND [2] NOT better after SITZ bath, suppository or enema   Negative: [1] MILD abdominal pain (e.g., does not interfere with normal activities) AND [2] pain comes and goes (cramps) AND [3] fever   Negative: Last bowel movement (BM) > 4 days ago   Negative: Leaking stool   Negative: Unable to have a bowel movement (BM) without manually removing stool (using finger to pull out stool or perform disimpaction)   Negative: Unable to have a bowel movement (BM) without laxative or enema   Negative: [1] Significant weight loss (more than 10 pounds [4.5 kg]; 5% or more) AND [2] not dieting   Negative: [1] MILD SWELLING of abdomen (e.g., looks distended or swollen) AND [2] new-onset or getting worse   Negative: Pencil-like, narrow stools   Negative: Taking new prescription medication   Negative: [1] Minor bleeding from rectum (e.g., blood just on toilet paper, few  drops, streaks on surface of normal formed BM) AND [2] 3 or more times   Negative: [1] Constipation persists > 1 week AND [2] no improvement after using Care Advice   Negative: [1] Uses laxative (e.g., PEG / Miralax, Milk of Magnesia) or enema AND [2] more than once a month   Negative: Constipation is a chronic symptom (recurrent or ongoing AND present > 4 weeks)   Negative: MILD constipation   Negative: Over-The-Counter (OTC) medicines for constipation, questions about   Negative: Rectal pain   Negative: [1] Difficulty breathing or swallowing AND [2] starts within 2 hours after injection   Negative: Difficult to awaken or acting confused (e.g., disoriented, slurred speech)   Negative: Unresponsive, passed out, or very weak   Negative: Sounds like a life-threatening emergency to the triager   Negative: Fever > 104 F (40 C)   Negative: [1] Measles vaccine rash (onset day 6-12) AND [2] purple or blood-colored   Negative: Sounds like a severe, unusual reaction to the triager   Negative: [1] Redness or red streak around the injection site AND [2] begins > 48 hours after shot AND [3] fever   Negative: [1] Fever > 101 F (38.3 C) AND [2] age > 60 years AND [3] begins > 48 hours after getting vaccine   Negative: [1] Fever > 100 F (37.8 C) AND [2] bedridden (e.g., CVA, chronic illness, recovering from surgery)   Negative: [1] Fever > 100 F (37.8 C) AND [2] diabetes mellitus or weak immune system (e.g., HIV positive, cancer chemo, splenectomy, organ transplant, chronic steroids)   Negative: Fever present > 3 days (72 hours)   Negative: [1] Smallpox vaccine and [2] eye pain, eye redness, or rash on eyelids   Negative: [1] Redness or red streak around the injection site AND [2] begins > 48 hours after shot AND [3] no fever  (Exception: Red area < 1 inch or 2.5 cm wide.)   Negative: [1] Over 3 days (72 hours) since shot AND [2] redness, swelling or pain getting worse   Negative: [1] Pain, tenderness, or swelling at the injection  site AND [2] persists > 3 days   Negative: [1] Measles vaccine rash (onset day 6-12) AND [2] persists > 3 days   Negative: [1] Deep lump follows (in 2 to 8 weeks) Td or TDaP  shot AND [2] becomes tender to the touch   Negative: Immunization needed, questions about   Negative: Injection site reaction to any vaccine   Negative: [1] Vaccines for travel, questions about AND [2] no current symptoms   Negative: Anthrax vaccine reactions   Negative: Chickenpox (varicella) vaccine reactions   Negative: Hepatitis A (HAV) vaccine reactions   Negative: Hepatitis B (HBV) vaccine reactions   Negative: Human Papilloma Virus (HPV) vaccine reactions    Protocols used: Constipation-A-AH, Immunization [id number]

## 2024-09-18 DIAGNOSIS — R25.8 INVOLUNTARY JERKY MOVEMENTS: Primary | ICD-10-CM

## 2024-09-18 DIAGNOSIS — N18.32 STAGE 3B CHRONIC KIDNEY DISEASE: ICD-10-CM

## 2024-09-18 DIAGNOSIS — N18.31 STAGE 3A CHRONIC KIDNEY DISEASE: Primary | ICD-10-CM

## 2024-09-19 ENCOUNTER — TELEPHONE (OUTPATIENT)
Dept: FAMILY MEDICINE | Facility: CLINIC | Age: 69
End: 2024-09-19
Payer: MEDICARE

## 2024-09-19 NOTE — TELEPHONE ENCOUNTER
"Spoke with pts sister Juhi, informed her that a referral to kidney doctor due to reduced kidney function was placed and someone will call to schedule. Per Dr. Joiner. Pts sister stated "they called yesterday and we will go in October, thank you for calling"    "

## 2024-09-19 NOTE — TELEPHONE ENCOUNTER
----- Message from Eli Snow MD sent at 9/18/2024 12:54 PM CDT -----  Please let patient know I put in referral to kidney doctor due to reduced kidney function. Last time he saw one was over 18 months ago. We need to reestablish. Thanks

## 2024-09-24 ENCOUNTER — OFFICE VISIT (OUTPATIENT)
Dept: NEUROLOGY | Facility: CLINIC | Age: 69
End: 2024-09-24
Payer: MEDICARE

## 2024-09-24 VITALS
WEIGHT: 264 LBS | SYSTOLIC BLOOD PRESSURE: 119 MMHG | BODY MASS INDEX: 37.8 KG/M2 | HEIGHT: 70 IN | HEART RATE: 59 BPM | DIASTOLIC BLOOD PRESSURE: 78 MMHG

## 2024-09-24 DIAGNOSIS — R25.8 INVOLUNTARY JERKY MOVEMENTS: ICD-10-CM

## 2024-09-24 PROCEDURE — 99999 PR PBB SHADOW E&M-EST. PATIENT-LVL IV: CPT | Mod: PBBFAC,,, | Performed by: STUDENT IN AN ORGANIZED HEALTH CARE EDUCATION/TRAINING PROGRAM

## 2024-09-24 NOTE — PROGRESS NOTES
[facility]  Clinic Note    Date of Service: 9/24/2024  Patient seen at the request of: Eli Snow,*    Reason for Consultation  Head tremor    Assessment:  Juvenal Bernal is a 69 y.o. male who presents with subtle tremor in the head noticed by people and sometimes by the patient's when he sitting watching TV, also notice some mild subtle action tremor when he tried to hold or use fine motor with his right hand.  Neuro exam with very subtle head tremor, right arm tremor no appreciated.  History of brother with essential tremor.  Patient drinks 2-3 cups of coffee day, does not drink.  Impression essential tremor, very subtle most pronounced in the head.       Plan:    - we discussed treatment options but giving the symptom is very mild we will defer starting medication for now and monitor  -we discussed nonpharmacological options as well   -I advised the patient if he notice any worsening or any new symptoms to let me know we can schedule a follow-up appointment        Signed:    Mihai Orellana MD  Neurology/Vascular neurology   09/24/2024 9:44 AM      HPI:  Juvenal Bernal is a 69 y.o. male with   Past Medical History:   Diagnosis Date    Anticoagulant long-term use     Arthritis     right hip    Atrial fibrillation     CHF (congestive heart failure)     Hypertension     Kidney stones     Mixed hyperlipidemia     Personal history of colonic polyps 03/22/2024   Intermittent head shaking, on and off for many years, people notice it, no worsen or get better, can not remember if anything trigger it, noticed L hand shake sometimes when he holds things, patient does not drink.       On Eliquis  Aspirin  MEHNAZ, right hip arthroplasty, carpal tunnel left wrist    Intermittent sudden jerky movement of his head to 1 side and bilateral hand tremor on both couple of months, no seizure reported    This is the extent of the patient's complaints at this time.    TSH   Date Value Ref Range Status   09/18/2024 0.806  0.400 - 4.000 uIU/mL Final   02/16/2024 2.080 0.400 - 4.000 uIU/mL Final     Comment:     Warning:  Heterophilic antibodies in serum or plasma of   certain individuals are known to cause interference with   immunoassays. These antibodies may be present in blood samples   from individuals regularly exposed to animal or who have been   treated with animal products.     Patients taking high doses of supplemental biotin may have  negatively biased results.        Vitamin B-12   Date Value Ref Range Status   09/18/2024 615 210 - 950 pg/mL Final         Review of Systems:  ROS negative unless noted in HPI    Past Surgical History:  Past Surgical History:   Procedure Laterality Date    COLONOSCOPY      COLONOSCOPY N/A 03/22/2024    Procedure: COLONOSCOPY;  Surgeon: Luz Maria Chapman MD;  Location: [facility];  Service: Endoscopy;  Laterality: N/A;    KIDNEY STONE SURGERY      at [place]    LITHOTRIPSY      OTHER SURGICAL HISTORY      hematoma(dried up blood) removed from around hip area-left side    TOTAL HIP ARTHROPLASTY Right 01/01/2013       Family History:  Family History   Problem Relation Name Age of Onset    No Known Problems Mother      No Known Problems Father estranged     No Known Problems Sister x4     Chronic back pain Brother x2     Parkinsonism Brother x2     No Known Problems Daughter x3     No Known Problems Son x1        Social History:  Social History     Tobacco Use    Smoking status: Never     Passive exposure: Never    Smokeless tobacco: Never   Substance Use Topics    Alcohol use: No     Alcohol/week: 0.0 standard drinks of alcohol    Drug use: Never       Allergies:  Patient has no known allergies.    Outpatient Medications:  Prior to Admission medications    Medication Sig Start Date End Date Taking? Authorizing Provider   apixaban (ELIQUIS) 5 mg Tab Take 1 tablet (5 mg total) by mouth 2 (two) times daily. 6/27/24   Eli Snow MD   aspirin (ECOTRIN) 81 MG EC tablet Take 1 tablet  (81 mg total) by mouth once daily. 4/11/17   Chris Holliday MD   atorvastatin (LIPITOR) 40 MG tablet Take 1 tablet by mouth once daily 6/19/24   Eli Snow MD   carvediloL (COREG) 25 MG tablet Take 1 tablet (25 mg total) by mouth 2 (two) times daily with meals. 2/15/24 2/14/25  Eli Snow MD   diclofenac sodium (VOLTAREN ARTHRITIS PAIN) 1 % Gel Apply 4 g topically once daily.  Patient taking differently: Apply 4 g topically once daily. Not using 2/15/24   Eli Snow MD   furosemide (LASIX) 40 MG tablet Take 1 tablet (40 mg total) by mouth 2 (two) times daily. 2/15/24 2/14/25  Eli Snow MD   multivit-minerals/folic acid (MULTIVITAMIN GUMMIES ORAL) Take 1 tablet by mouth 2 (two) times a day.    Provider, Historical   sacubitriL-valsartan (ENTRESTO)  mg per tablet Take 1 tablet by mouth 2 (two) times daily. 6/6/24   Miki Salgado, NP   spironolactone (ALDACTONE) 25 MG tablet Take 1/2 (one-half) tablet by mouth once daily 6/19/24   Miki Salgado, NP   tamsulosin (FLOMAX) 0.4 mg Cap Take 1 capsule by mouth. 7/7/24   Provider, Historical       Physical exam:    Vitals: There were no vitals taken for this visit.    General:   Sitting in chair, in no distress, well-nourished, well-developed, appears stated age.  Head/Neck:   Normocephalic,atraumatic  Pulm:  Non-labored breathing     Mental Status: Alert and oriented to person, time, place, situation. Speech spontaneous and fluent without paraphasias; no dysarthria  CN:  II: visual fields full  III, IV, VI: EOM intact without nystagmus or diplopia.   V: Sensation to light touch full and symmetric in V1-3. [place]eter contraction full bilaterally.   VII: Facial movement full and symmetric.   VIII: Hearing grossly normal to conversation.  IX, X: Palate midline with symmetric elevation.    XI: SCM and trapezius: 5/5 bilaterally.   XII: Tongue midline without fasciculations.  Motor: Normal bulk and tone throughout  all four extremities.   RUE: D: 5/5; B: 5/5; T:  5/5; WF:5/5; WE:  5/5; IO: 5/5   LUE: D: 5/5; B: 5/5; T:  5/5; WF: 5/5; WE:  5/5; IO: 5/5   RLE: HF: 5/5, KE: 5/5, KF: 5/5, DF: 5/5, PF: 5/5  LLE: HF: 5/5, KE: 5/5, KF: 5/5, DF: 5/5, PF: 5/5  No tremors   Sensory: Intact and symmetric to light touch throughout.  Reflexes: RUE: Triceps 2+, biceps 2+, brachioradialis 2+  LUE: Triceps 2+, biceps 2+ brachioradialis 2+  RLE: Knee 2+, ankle 2+  LLE: Knee 2+, ankle 2+  Coordination:  Intact and symmetric to finger-to-nose and heel-to-shin.  Gait:  Intact to casual gait.    Imaging:  All pertinent imaging was personally reviewed.      Results for orders placed during the hospital encounter of 09/18/24    MRI Brain Without Contrast    Narrative  EXAMINATION:  MRI BRAIN WITHOUT CONTRAST    CLINICAL HISTORY:  Parkinsonian syndrome;.  Other abnormal involuntary movements    TECHNIQUE:  Multiplanar multisequence MR imaging of the brain was performed without contrast.    COMPARISON:  CT head without contrast 04/05/2023    FINDINGS:  Intracranial compartment:    Ventricles and sulci are normal in size for age without evidence of hydrocephalus. No extra-axial blood or fluid collections.    Patchy scattered and confluent regions subcortical and periventricular T2/FLAIR hyperintense signal, overall nonspecific, but can be seen in setting of microvascular ischemic change.  No mass lesion, acute hemorrhage, edema or acute infarct.    Normal vascular flow voids are preserved.    Skull/extracranial contents (limited evaluation): Bone marrow signal intensity is normal.    Paranasal sinuses and mastoid air cells are essentially clear.    Impression  No acute intracranial pathology    Sequela of chronic microvascular ischemic change.      Electronically signed by: John Mathew  Date:    09/18/2024  Time:    12:48        I spent a total of 35 minutes on the day of the visit. This includes face to face time and non-face to face time preparing to  see the patient (eg, review of tests), obtaining and/or reviewing separately obtained history, documenting clinical information in the electronic or other health record, independently interpreting results and communicating results to the patient/family/caregiver, or care coordinator.

## 2024-10-16 ENCOUNTER — OFFICE VISIT (OUTPATIENT)
Dept: NEPHROLOGY | Facility: CLINIC | Age: 69
End: 2024-10-16
Payer: MEDICARE

## 2024-10-16 VITALS
DIASTOLIC BLOOD PRESSURE: 72 MMHG | BODY MASS INDEX: 37.01 KG/M2 | WEIGHT: 257.94 LBS | SYSTOLIC BLOOD PRESSURE: 118 MMHG

## 2024-10-16 DIAGNOSIS — I12.9 BENIGN HYPERTENSION WITH CKD (CHRONIC KIDNEY DISEASE) STAGE III: ICD-10-CM

## 2024-10-16 DIAGNOSIS — N18.32 STAGE 3B CHRONIC KIDNEY DISEASE: Primary | ICD-10-CM

## 2024-10-16 DIAGNOSIS — N18.30 BENIGN HYPERTENSION WITH CKD (CHRONIC KIDNEY DISEASE) STAGE III: ICD-10-CM

## 2024-10-16 DIAGNOSIS — N20.0 NEPHROLITHIASIS: ICD-10-CM

## 2024-10-16 DIAGNOSIS — R73.03 PREDIABETES: ICD-10-CM

## 2024-10-16 DIAGNOSIS — R80.9 PROTEINURIA, UNSPECIFIED TYPE: ICD-10-CM

## 2024-10-16 PROCEDURE — 99999 PR PBB SHADOW E&M-EST. PATIENT-LVL III: CPT | Mod: PBBFAC,,, | Performed by: NURSE PRACTITIONER

## 2024-10-16 NOTE — PROGRESS NOTES
Subjective:       Patient ID: Juvenal Bernal is a 69 y.o. black or  Rosalia male who presents for evaluation of CKD 3a.    HPI     Juvenal Bernal is a 67 year old male with HTN, nephroliothiasis, ED, Afib/ A flutter, MEHNAZ, h/o TIA that presents for new evaluation of CKD 3a. Previously followed by Dr. Jama with urology for calcium kidney stone s/p lithotripsy. Recently seen by Dr. Soliz with urology with cystoscopy on 4/11/23 showing large left renal stone. Reported plans for repeat lithotripsy. He reports longstanding HTN. Cardiology following antihypertensives with recent increase of lasix to 40mg BID. He is s/p admission for TIA and MYLES in 04/2023. Received contrast with CT done on 4/1/23. Peak sCr at this time 2.1 on 4/5/23 from baseline of ~1.4. He drinks about 1L of water per day. He reports his sister has kidney disease related to diabetes. The patient denies prior nephrologist, taking NSAIDs, herbal supplements, or new antibiotics, recreational drugs, recent episode of dehydration, diarrhea, nausea or vomiting, acute illness.     Significant family hx includes: sister- CKD 2/2 DM    Update 8/15/23:  - Presents with wife for follow-up of CKD  - Planning Cystolithotripsy, TURP pending cardiology clearance  - Reports no recent issues with stones. Being treated for UTI with augmentin.  - No other complaints at this time.     Update 10/16/24:  - Presents for follow-up of CKD and nephrolithiasis. Cr 1.7.  - S/p TURP and cystolitholapaxy on 12/22/23   - No malignancy per pathology  - He denies complaints today including SOB, swelling, urinary difficulty, hematuria, dysuria. No known passage of stones. Had follow-up with [facility] in April.   - Drinks about 2.5L of water per day    Review of Systems   Respiratory:  Negative for shortness of breath.    Cardiovascular:  Negative for leg swelling.   Genitourinary:  Negative for difficulty urinating, dysuria and hematuria.   All other systems reviewed and are  negative.      Objective:       Blood pressure 118/72, weight 117 kg (257 lb 15 oz).  Physical Exam  Vitals reviewed.   Constitutional:       General: He is not in acute distress.  HENT:      Head: Normocephalic and atraumatic.   Eyes:      General: No scleral icterus.  Cardiovascular:      Rate and Rhythm: Rhythm irregular.   Pulmonary:      Effort: Pulmonary effort is normal. No respiratory distress.      Breath sounds: No wheezing or rales.   Musculoskeletal:      Right lower leg: No edema.      Left lower leg: No edema.   Skin:     General: Skin is warm and dry.   Neurological:      Mental Status: He is alert and oriented to person, place, and time.      Comments: Alert, speech clear, answers questions appropriately    Psychiatric:         Mood and Affect: Mood normal.         Behavior: Behavior normal.           Lab Results   Component Value Date    CREATININE 1.7 (H) 09/18/2024    URICACID 2.67 08/23/2023     Prot/Creat Ratio, Urine   Date Value Ref Range Status   08/08/2023 0.48 (H) 0.00 - 0.20 Final   05/16/2023 0.05 0.00 - 0.20 Final   04/01/2023 0.34 (H) 0.00 - 0.20 Final     Lab Results   Component Value Date     09/18/2024    K 4.4 09/18/2024    CO2 25 09/18/2024     09/18/2024     Lab Results   Component Value Date    PTH 64.8 08/08/2023    CALCIUM 9.8 09/18/2024    PHOS 3.0 08/08/2023     Lab Results   Component Value Date    HGB 13.8 (L) 09/18/2024    WBC 10.67 09/18/2024    HCT 41.8 09/18/2024      Lab Results   Component Value Date    HGBA1C 6.2 (H) 09/18/2024     09/18/2024    BUN 18 09/18/2024     Lab Results   Component Value Date    LDLCALC 62.0 (L) 09/18/2024     CT Abdomen 4/1/23  FINDINGS:  Pulmonary arterial system: This is a good quality examination for the evaluation of pulmonary thromboembolism with good opacification of the pulmonary arteries to the level of the segmental branches of the pulmonary arterial system. There is no evidence of acute pulmonary  thromboembolism. No large saddle pulmonary embolism, enlargement of the main pulmonary artery, or secondary findings of right ventricular strain.  Aorta and heart: The heart is enlarged in size.  Multi chamber with calcifications in the mitral valve annulus.  Is no pericardial fluid.  No thoracic aortic aneurysm.  No thoracic aortic dissection.  Airways: Unremarkable.  Lymph nodes: No pathologically enlarged lymph nodes in the chest or axilla.  Lungs: Scattered ground-glass opacities with no nodular consistency.  No lobar consolidation.  No dominant nodule.  Otherwise, lungs are clear with no evidence of airspace consolidation, mass, or bronchiectasis.  No emphysematous lung architecture.  Pleura: No significant volume of pleural fluid or pneumothorax.  No pleural based masses.  Abdomen and pelvis: The liver, spleen, pancreas, adrenal glands and kidneys appear unremarkable, allowing for bilateral low-density lesions in the kidneys compatible with cortical cysts.  The abdominal aorta and vena cava are normal in caliber with minimal atherosclerotic disease in the aorta and iliac system.  The loops of large and small intestines appear unremarkable.  There is no free air or inflammation.  The urinary bladder contains a 2.25 cm spiculated calcified K shin/calcified lesion in the left side of the trigone with thickening along the posterior trigone.  The prostate gland is mildly enlarged with median lobe prominence but no invagination into the urinary bladder.  No pelvic mass or free fluid is evident.  Degenerative changes with sclerotic bone island again noted in the iliac wing left.  Lumbar stenosis at multiple levels due to a short pedicles and hypertrophic posterior element changes.  Total hip replacement on the right.     Impression:     1. No evidence of acute pulmonary thromboembolism.  2. Scattered ground-glass opacities in the lungs.  Correlate for early pulmonary edema or pneumonitis.  3. No lobar consolidation  or effusion.  4. Cardiac chamber enlargement.  5. Calcified spiculated lesion in the urinary bladder toward the left with thickened trigone.  While this could represent a bladder calculus, calcified bladder mass with bladder wall thickening is difficult to exclude.  Urology follow-up and interrogation is urged if not already performed.  6.  This report was flagged in Epic as abnormal.      Assessment:       1. Stage 3b chronic kidney disease    2. Nephrolithiasis    3. Proteinuria, unspecified type    4. Benign hypertension with CKD (chronic kidney disease) stage III    5. Prediabetes            Plan:   CKD stage 3b c eGFR 47.6--. 43.6 mL/min -  - Baseline Cr ~1.4, CKD clinically related to longstanding HTN. Cr primarily ranging 1.4-1.6 over the last year.   - Following with Urology for renal stones, management below  - Avoid NSAIDs, increase hydration, recommend further BP control     UPCR 50mg--> 480mg/g in setting of UTI; continue RAASi. Repeat UPCR with next visit.    Acid-base WNL   Renal osteodystrophy PTH, Ca, and phos stable. Monitor PTH and Vit D.    Anemia WNL   DM No history. Now with preDM. Encouraged dietary and lifestyle changes for weight loss.    Lipid Management On statin   ESRD planning Provided education about the stages of CKD, usual progression, and need to monitor for and treat CKD-related disease in an effort to delay progression of CKD.     HTN   - At goal  - coreg 25 BID, lasix 40 BID, spironolactone 12.5,  entresto per cardiology   - low Na diet     Nephrolithiasis  - Following with Urology - S/p TURP and cystolitholapaxy on 12/22/23   - Stone analysis in 2017 showed 100% CaOx stones   - H2O intake for UOP >2L per day, low Na diet, reduce animal protein  - Potential for dietician referral for kidney stones in future when dietician available through our clinic.   - No issues with stones since procedure. If so would recommend addition of potassium citrate.       All questions patient had were  answered.  Asked if further questions. None. F/u in clinic 3 months  with labs and urine prior to next visit or sooner if needed.  ER for emergency concerns.    Summary of Plan:  - RTC 3 months with labs for monitoring - RFP, CBC, UA, UPCR, PTH and Vit D

## 2024-11-18 ENCOUNTER — TELEPHONE (OUTPATIENT)
Dept: SLEEP MEDICINE | Facility: CLINIC | Age: 69
End: 2024-11-18
Payer: MEDICARE

## 2024-11-18 ENCOUNTER — PATIENT MESSAGE (OUTPATIENT)
Dept: SLEEP MEDICINE | Facility: CLINIC | Age: 69
End: 2024-11-18
Payer: MEDICARE

## 2024-11-18 NOTE — TELEPHONE ENCOUNTER
Spoke w/ Pt sister. She stated that she will reach out to her brother Juvenal and find out if the appt for 11/19 at 11:00 will work for him and she will give office a call back.

## 2024-11-18 NOTE — TELEPHONE ENCOUNTER
----- Message from Paulette sent at 11/18/2024  8:22 AM CST -----  Contact: Juhi  Type:  Patient Call          Who Called: patient sister - Juhi         Does the patient know what this is regarding?: Requesting a call back to have a appt scheduled within 90 days since he picked up his CPAP machine in August ; please advise           Would the patient rather a call back or a response via [facility]?call           Best Call Back Number: [phone]             Additional Information:

## 2024-11-19 ENCOUNTER — OFFICE VISIT (OUTPATIENT)
Dept: SLEEP MEDICINE | Facility: CLINIC | Age: 69
End: 2024-11-19
Attending: PSYCHIATRY & NEUROLOGY
Payer: MEDICARE

## 2024-11-19 VITALS
BODY MASS INDEX: 29.67 KG/M2 | DIASTOLIC BLOOD PRESSURE: 82 MMHG | SYSTOLIC BLOOD PRESSURE: 113 MMHG | HEART RATE: 65 BPM | WEIGHT: 206.81 LBS

## 2024-11-19 DIAGNOSIS — G47.33 OSA (OBSTRUCTIVE SLEEP APNEA): Primary | ICD-10-CM

## 2024-11-19 PROCEDURE — 1159F MED LIST DOCD IN RCRD: CPT | Mod: CPTII,S$GLB,, | Performed by: PSYCHIATRY & NEUROLOGY

## 2024-11-19 PROCEDURE — 3079F DIAST BP 80-89 MM HG: CPT | Mod: CPTII,S$GLB,, | Performed by: PSYCHIATRY & NEUROLOGY

## 2024-11-19 PROCEDURE — 3074F SYST BP LT 130 MM HG: CPT | Mod: CPTII,S$GLB,, | Performed by: PSYCHIATRY & NEUROLOGY

## 2024-11-19 PROCEDURE — 4010F ACE/ARB THERAPY RXD/TAKEN: CPT | Mod: CPTII,S$GLB,, | Performed by: PSYCHIATRY & NEUROLOGY

## 2024-11-19 PROCEDURE — 99999 PR PBB SHADOW E&M-EST. PATIENT-LVL III: CPT | Mod: PBBFAC,,, | Performed by: PSYCHIATRY & NEUROLOGY

## 2024-11-19 PROCEDURE — 1126F AMNT PAIN NOTED NONE PRSNT: CPT | Mod: CPTII,S$GLB,, | Performed by: PSYCHIATRY & NEUROLOGY

## 2024-11-19 PROCEDURE — 3066F NEPHROPATHY DOC TX: CPT | Mod: CPTII,S$GLB,, | Performed by: PSYCHIATRY & NEUROLOGY

## 2024-11-19 PROCEDURE — 3288F FALL RISK ASSESSMENT DOCD: CPT | Mod: CPTII,S$GLB,, | Performed by: PSYCHIATRY & NEUROLOGY

## 2024-11-19 PROCEDURE — 3044F HG A1C LEVEL LT 7.0%: CPT | Mod: CPTII,S$GLB,, | Performed by: PSYCHIATRY & NEUROLOGY

## 2024-11-19 PROCEDURE — 1101F PT FALLS ASSESS-DOCD LE1/YR: CPT | Mod: CPTII,S$GLB,, | Performed by: PSYCHIATRY & NEUROLOGY

## 2024-11-19 PROCEDURE — 3008F BODY MASS INDEX DOCD: CPT | Mod: CPTII,S$GLB,, | Performed by: PSYCHIATRY & NEUROLOGY

## 2024-11-19 PROCEDURE — 99214 OFFICE O/P EST MOD 30 MIN: CPT | Mod: S$GLB,,, | Performed by: PSYCHIATRY & NEUROLOGY

## 2024-11-19 NOTE — PROGRESS NOTES
11/19/2024    11:28 AM 6/17/2024     4:59 PM   EPWORTH SLEEPINESS SCALE TOTAL SCORE    Total score 5  9       Proxy-reported       Juvenal Bernal is a 69 y.o. male seen today for CPAP  follow up. Last seen on 6/18/2024.      Under the nose Dreamwear mask - comfortable      + air hunger at times  Failing compliance ee so far but has till 12/17th  Will watch TV with CPAP  AHi was high  Only used 3 days    Increased 6-12 to 6-20  Increased starting from 5 to 6 cm H2O  Did not bring mask  From water in the tank - new electric problem]  Will do titration to insure optimal settings high AHI)  ____    INTERVAL HISTORY:    6/18/2024:  Juvenal Bernal is a 69 y.o. male is here to be evaluated for a sleep disorder; referred by No ref. provider found.    Was seen in our sleep clinic in 2016 -2017 - had HST  (moderate-severe OSA_) - did not follow up    The patient reports snoring and interrupted sleep since several yeats ago .   Juvenal Bernal denied  witnessed apneas.    The patient feels rested upon awakening. Takes naps (while watchin g TV).     The patient  denies morning headaches and reports occasional  dry mouth on awakening.   Juvenal Bernal denies  nasal congestion.      The patient  denied difficulty  with falling or staying asleep.    Juvenal Bernal  denies symptoms concerning for parasomnia except for occasional somniloquy.  The patient  denies auxiliary symptoms of narcolepsy including sleep onset paralysis, hypnagogic hallucinations, sleep attacks and cataplexy.    Juvenal Bernal denied symptoms concerning for RLS; nocturnal leg movements have not been noticed.   The patient does not experience sleep related leg cramps.         Medications pertinent to sleep  disorders taken currently: -  Previous  medications taken  for sleep disorders:  -    Sleep studies  HST 2017: AHI 28, RDI 54, SaO2 min 84%        Occupation:retired 2 yrs ag        11/19/2024    11:28 AM 6/17/2024     4:59 PM   EPWORTH  SLEEPINESS SCALE TOTAL SCORE    Total score 5  9       Proxy-reported             6/17/2024     4:59 PM   EPWORTH SLEEPINESS SCALE   Sitting and reading 2   Watching TV 2   Sitting, inactive in a public place (e.g. a theatre or a meeting) 0   As a passenger in a car for an hour without a break 1   Lying down to rest in the afternoon when circumstances permit 3   Sitting and talking to someone 0   Sitting quietly after a lunch without alcohol 1   In a car, while stopped for a few minutes in traffic 0   Total score 9           7/10/2024   PHQ-9 Depression Patient Health Questionnaire   Over the last two weeks how often have you been bothered by little interest or pleasure in doing things 0   Over the last two weeks how often have you been bothered by feeling down, depressed or hopeless 0              6/17/2024     4:59 PM   EPWORTH SLEEPINESS SCALE   Sitting and reading 2   Watching TV 2   Sitting, inactive in a public place (e.g. a theatre or a meeting) 0   As a passenger in a car for an hour without a break 1   Lying down to rest in the afternoon when circumstances permit 3   Sitting and talking to someone 0   Sitting quietly after a lunch without alcohol 1   In a car, while stopped for a few minutes in traffic 0   Total score 9         6/17/2024     5:07 PM   Sleep Clinic New Patient   What time do you go to bed on a week day? (Give a range) 11pm-12am   What time do you go to bed on a day off? (Give a range) 11-12am   How long does it take you to fall asleep? (Give a range) 45mins-1hr   On average, how many times per night do you wake up? 2x   How long does it take you to fall back into sleep? (Give a range) 10-15mins   What time do you wake up to start your day on a week day? (Give a range) 7-7:30am   What time do you wake up to start your day on a day off? (Give a range) 7am   What time do you get out of bed? (Give a range) 7-8am   On average, how many hours do you sleep? 6hrs   On average, how many naps do you  take per day? 0   Rate your sleep quality from 0 to 5 (0-poor, 5-great). 3   Have you experienced:  Weight gain?   How much weight have you lost or gained (in lbs.) in the last year? 20lbs   On average, how many times per night do you go to the bathroom?  2   Have you ever had a sleep study/CPAP machine/surgery for sleep apnea? Yes   Have you ever had a CPAP machine for sleep apnea? No   Have you ever had surgery for sleep apnea? No           6/17/2024     5:07 PM   Sleep Clinic ROS    Difficulty breathing through the nose?  No   Sore throat or dry mouth in the morning? No   Irregular or very fast heart beat?  Yes   Shortness of breath?  Yes   Acid reflux? No   Body aches and pains?  Yes   Morning headaches? No   Dizziness? Sometimes   Mood changes?  No   Do you exercise?  Yes   Do you feel like moving your legs a lot?  Yes       DME:         PAST MEDICAL HISTORY:    Active Ambulatory Problems     Diagnosis Date Noted    Essential hypertension 12/04/2015    Erectile dysfunction due to arterial insufficiency 12/04/2015    Heart murmur 12/04/2015    Obesity, Class II, BMI 35-39.9, with comorbidity 12/04/2015    Atrial flutter 12/22/2015    Soft tissue mass 12/22/2015    Mass of left thigh 01/15/2016    Lesion of skeletal muscle 02/26/2016    MEHNAZ (obstructive sleep apnea)     UTI (urinary tract infection) 09/22/2017    Elevated PSA 09/22/2017    Calcium kidney stone 09/22/2017    Kidney stone 11/22/2017    Right ureteral stone 12/22/2017    Chronic combined systolic and diastolic CHF, NYHA class 2 04/05/2023    Atrial fibrillation 04/14/2023    Bladder mass 05/05/2023    Class 2 severe obesity due to excess calories with serious comorbidity and body mass index (BMI) of 38.0 to 38.9 in adult 05/05/2023    Persistent atrial fibrillation 05/19/2023    Stage 3a chronic kidney disease 05/24/2023    Current use of long term anticoagulation 05/26/2023    Prediabetes 05/29/2023    BPH with urinary obstruction 05/29/2023     Microcytosis 05/29/2023    Carpal tunnel syndrome of left wrist 02/26/2024    S/P total right hip arthroplasty 03/18/2024    Right hip pain 03/18/2024    Weakness of right lower extremity 03/18/2024    Impaired mobility and activities of daily living 03/18/2024    Personal history of colonic polyps 03/22/2024    Aortic atherosclerosis 06/06/2024    Class 2 severe obesity due to excess calories with serious comorbidity and body mass index (BMI) of 37.0 to 37.9 in adult 07/10/2024     Resolved Ambulatory Problems     Diagnosis Date Noted    Acute hypoxemic respiratory failure 04/01/2023    Acute exacerbation of CHF (congestive heart failure) 04/01/2023    Fever 04/01/2023    TIA (transient ischemic attack) 04/05/2023    MYLES (acute kidney injury) 04/05/2023    Encounter for medical examination to establish care 05/24/2023    Pre-operative cardiovascular examination 11/30/2023    Screening for malignant neoplasm of colon 03/11/2024     Past Medical History:   Diagnosis Date    Anticoagulant long-term use     Arthritis     CHF (congestive heart failure)     Hypertension     Kidney stones     Mixed hyperlipidemia                 PAST SURGICAL HISTORY:    Past Surgical History:   Procedure Laterality Date    COLONOSCOPY      COLONOSCOPY N/A 03/22/2024    Procedure: COLONOSCOPY;  Surgeon: Luz Maria Chapman MD;  [facility];  Service: Endoscopy;  Laterality: N/A;    KIDNEY STONE SURGERY      at [place]    LITHOTRIPSY      OTHER SURGICAL HISTORY      hematoma(dried up blood) removed from around hip area-left side    TOTAL HIP ARTHROPLASTY Right 01/01/2013         FAMILY HISTORY:                Family History   Problem Relation Name Age of Onset    No Known Problems Mother      No Known Problems Father estranged     No Known Problems Sister x4     Chronic back pain Brother x2     Parkinsonism Brother x2     No Known Problems Daughter x3     No Known Problems Son x1        SOCIAL HISTORY:          Tobacco:    Social History     Tobacco Use   Smoking Status Never    Passive exposure: Never   Smokeless Tobacco Never       alcohol use:    Social History     Substance and Sexual Activity   Alcohol Use No    Alcohol/week: 0.0 standard drinks of alcohol                   ALLERGIES:  Review of patient's allergies indicates:  No Known Allergies    CURRENT MEDICATIONS:    Current Outpatient Medications   Medication Sig Dispense Refill    apixaban (ELIQUIS) 5 mg Tab Take 1 tablet (5 mg total) by mouth 2 (two) times daily. 60 tablet 3    aspirin (ECOTRIN) 81 MG EC tablet Take 1 tablet (81 mg total) by mouth once daily. 30 tablet 2    atorvastatin (LIPITOR) 40 MG tablet Take 1 tablet by mouth once daily 90 tablet 3    carvediloL (COREG) 25 MG tablet Take 1 tablet (25 mg total) by mouth 2 (two) times daily with meals. 180 tablet 3    diclofenac sodium (VOLTAREN ARTHRITIS PAIN) 1 % Gel Apply 4 g topically once daily. (Patient taking differently: Apply 4 g topically once daily. Not using) 350 g 1    furosemide (LASIX) 40 MG tablet Take 1 tablet (40 mg total) by mouth 2 (two) times daily. 180 tablet 3    multivit-minerals/folic acid (MULTIVITAMIN GUMMIES ORAL) Take 1 tablet by mouth 2 (two) times a day.      sacubitriL-valsartan (ENTRESTO)  mg per tablet Take 1 tablet by mouth 2 (two) times daily. 180 tablet 3    spironolactone (ALDACTONE) 25 MG tablet Take 1/2 (one-half) tablet by mouth once daily 45 tablet 3    tamsulosin (FLOMAX) 0.4 mg Cap Take 1 capsule by mouth.       No current facility-administered medications for this visit.                      PHYSICAL EXAM:  /82 (BP Location: Left arm, Patient Position: Sitting)   Pulse 65   Wt 93.8 kg (206 lb 12.8 oz)   BMI 29.67 kg/m²   GENERAL: Normal development, well groomed.  HEENT:   HEENT:  Conjunctivae are non-erythematous; Pupils equal, round, and reactive to light; Nose is symmetrical; Nasal mucosa is pink and moist; Septum is midline; Inferior turbinates are  normal; Nasal airflow is normal; Posterior pharynx is pink; Modified Mallampati:IV; Posterior palate is low; Tonsils not visualized; Uvula is  not visualized ;Tongue is normal; Dentition is fair; No TMJ tenderness; Jaw opening and protrusion without click and without discomfort.  NECK: Supple. Neck circumference is 16.5 inches. No thyromegaly. No palpable nodes.     SKIN: On face and neck: No abrasions, no rashes, no lesions.  No subcutaneous nodules are palpable.  RESPIRATORY: Chest is clear to auscultation.  Normal chest expansion and non-labored breathing at rest.  CARDIOVASCULAR: Normal S1, S2.  No murmurs, gallops or rubs. No carotid bruits bilaterally.  No edema. No clubbing. No cyanosis.    NEURO: Oriented to time, place and person. Normal attention span and concentration. Gait normal.    PSYCH: Affect is full. Mood is normal  MUSCULOSKELETAL: Moves 4 extremities. Gait normal.           ASSESSMENT:    1. MEHNAZ The patient symptomatically has  snoring, interrupted sleep, and excessive daytime sleepiness  with exam findings of crowded airway and elevated BMI. The patient has medical co-morbidities of atrial fibrillation  which can be worsened by MEHNAZ. This warrants treatment.Difficulty tolerating CPAP.            PLAN:      CPAP titration.      During our discussion today, we talked about the etiology of  MEHNAZ as well as the potential ramifications of untreated sleep apnea, which could include daytime sleepiness, hypertension, heart disease and/or stroke.  We discussed potential treatment options, which could include weight loss, body positioning, continuous positive airway pressure (CPAP), or referral for surgical consideration. Meanwhile, he  is urged to avoid supine sleep, weight gain and alcoholic beverages since all of these can worsen MEHNAZ.     The patient was given open opportunity to ask questions and/or express concerns about treatment plan. Two point patient identifier confirmed.       Precautions: The  patient was advised to abstain from driving should he feel sleepy or drowsy.    Follow up: MD after the sleep study has been completed.     ESS ([place] Sleepiness Scale) and other sleep medicine related questionnaires were reviewed with the patient.      46-minute visit. >50% spent counseling patient and coordination of care.  The patient was  cautioned against drowsy driving.

## 2024-11-19 NOTE — PATIENT INSTRUCTIONS
SLEEP LAB ([facility] or [facility]) will contact you to schedulethe sleep study. Their number is [phone] (ext 2). Please call them if you do not hear from them in 2 weeks from now.  [facility] is located on 7th floor of the [facility]; [facility] in [facility] ( 3rd floor [facility] Building).    SLEEP CLINIC (my assistant) will call you when the sleep study results are ready or I will message you through the portal with the results as we have discussed - if you have not heard from us by 2 weeks from the date of the study, or you can use [facility] to contact me.    Our clinic phone number is [phone] (ext 1)       You are advised to abstain from driving should you feel sleepy or drowsy.

## 2024-11-22 ENCOUNTER — TELEPHONE (OUTPATIENT)
Dept: SLEEP MEDICINE | Facility: OTHER | Age: 69
End: 2024-11-22
Payer: MEDICARE

## 2024-11-26 ENCOUNTER — HOSPITAL ENCOUNTER (OUTPATIENT)
Dept: SLEEP MEDICINE | Facility: HOSPITAL | Age: 69
Discharge: HOME | End: 2024-11-26
Attending: PSYCHIATRY & NEUROLOGY
Payer: MEDICARE

## 2024-11-26 DIAGNOSIS — G47.33 OSA (OBSTRUCTIVE SLEEP APNEA): ICD-10-CM

## 2024-11-26 PROCEDURE — 95811 POLYSOM 6/>YRS CPAP 4/> PARM: CPT

## 2024-11-27 NOTE — PROGRESS NOTES
atient was educated about the purpose of the wires and what data was being collected. Patient was informed that the technician may need to enter the room during the night to fix leads or make adjustments to the equipment.       SW Airfit N30i nasal mask used              Follow up instructions were provided to the patient.        
Spontaneous, unlabored and symmetrical

## 2024-12-08 ENCOUNTER — PATIENT MESSAGE (OUTPATIENT)
Dept: SLEEP MEDICINE | Facility: CLINIC | Age: 69
End: 2024-12-08

## 2024-12-08 ENCOUNTER — TELEPHONE (OUTPATIENT)
Dept: SLEEP MEDICINE | Facility: CLINIC | Age: 69
End: 2024-12-08
Payer: MEDICARE

## 2024-12-09 NOTE — TELEPHONE ENCOUNTER
I have changed the settings from 6-20 to 7-15 according to the sleep study readings.   Ultrafiltration was performed on this patient using the Povio [id number] hemoconcentrator.    The total amount of volume removed by ultrafiltration from this patient was 2400 mL.

## 2024-12-17 DIAGNOSIS — G47.33 OSA (OBSTRUCTIVE SLEEP APNEA): Primary | ICD-10-CM

## 2025-01-03 ENCOUNTER — PATIENT MESSAGE (OUTPATIENT)
Dept: CARDIOLOGY | Facility: CLINIC | Age: 70
End: 2025-01-03
Payer: MEDICARE

## 2025-01-17 ENCOUNTER — OFFICE VISIT (OUTPATIENT)
Dept: NEPHROLOGY | Facility: CLINIC | Age: 70
End: 2025-01-17
Payer: MEDICARE

## 2025-01-17 VITALS
DIASTOLIC BLOOD PRESSURE: 85 MMHG | BODY MASS INDEX: 37.01 KG/M2 | OXYGEN SATURATION: 95 % | HEART RATE: 76 BPM | SYSTOLIC BLOOD PRESSURE: 150 MMHG | WEIGHT: 257.94 LBS

## 2025-01-17 DIAGNOSIS — R73.03 PREDIABETES: ICD-10-CM

## 2025-01-17 DIAGNOSIS — N20.0 NEPHROLITHIASIS: ICD-10-CM

## 2025-01-17 DIAGNOSIS — R80.9 PROTEINURIA, UNSPECIFIED TYPE: ICD-10-CM

## 2025-01-17 DIAGNOSIS — N18.2 CKD (CHRONIC KIDNEY DISEASE) STAGE 2, GFR 60-89 ML/MIN: Primary | ICD-10-CM

## 2025-01-17 DIAGNOSIS — N18.30 BENIGN HYPERTENSION WITH CKD (CHRONIC KIDNEY DISEASE) STAGE III: ICD-10-CM

## 2025-01-17 DIAGNOSIS — I12.9 BENIGN HYPERTENSION WITH CKD (CHRONIC KIDNEY DISEASE) STAGE III: ICD-10-CM

## 2025-01-17 PROCEDURE — 1159F MED LIST DOCD IN RCRD: CPT | Mod: CPTII,S$GLB,, | Performed by: NURSE PRACTITIONER

## 2025-01-17 PROCEDURE — 1101F PT FALLS ASSESS-DOCD LE1/YR: CPT | Mod: CPTII,S$GLB,, | Performed by: NURSE PRACTITIONER

## 2025-01-17 PROCEDURE — 3077F SYST BP >= 140 MM HG: CPT | Mod: CPTII,S$GLB,, | Performed by: NURSE PRACTITIONER

## 2025-01-17 PROCEDURE — 3008F BODY MASS INDEX DOCD: CPT | Mod: CPTII,S$GLB,, | Performed by: NURSE PRACTITIONER

## 2025-01-17 PROCEDURE — 1126F AMNT PAIN NOTED NONE PRSNT: CPT | Mod: CPTII,S$GLB,, | Performed by: NURSE PRACTITIONER

## 2025-01-17 PROCEDURE — 99999 PR PBB SHADOW E&M-EST. PATIENT-LVL III: CPT | Mod: PBBFAC,,, | Performed by: NURSE PRACTITIONER

## 2025-01-17 PROCEDURE — 3079F DIAST BP 80-89 MM HG: CPT | Mod: CPTII,S$GLB,, | Performed by: NURSE PRACTITIONER

## 2025-01-17 PROCEDURE — 3066F NEPHROPATHY DOC TX: CPT | Mod: CPTII,S$GLB,, | Performed by: NURSE PRACTITIONER

## 2025-01-17 PROCEDURE — 99214 OFFICE O/P EST MOD 30 MIN: CPT | Mod: S$GLB,,, | Performed by: NURSE PRACTITIONER

## 2025-01-17 PROCEDURE — 3288F FALL RISK ASSESSMENT DOCD: CPT | Mod: CPTII,S$GLB,, | Performed by: NURSE PRACTITIONER

## 2025-01-17 NOTE — PROGRESS NOTES
Subjective:       Patient ID: Juvenal Bernal is a 69 y.o. black or  [place] male who presents for evaluation of CKD 3a.    HPI     Juvenal Bernal is a 67 year old male with HTN, nephroliothiasis, ED, Afib/ A flutter, MEHNAZ, h/o TIA that presents for new evaluation of CKD 3a. Previously followed by Dr. Jama with urology for calcium kidney stone s/p lithotripsy. Recently seen by Dr. Soliz with urology with cystoscopy on 4/11/23 showing large left renal stone. Reported plans for repeat lithotripsy. He reports longstanding HTN. Cardiology following antihypertensives with recent increase of lasix to 40mg BID. He is s/p admission for TIA and MYLES in 04/2023. Received contrast with CT done on 4/1/23. Peak sCr at this time 2.1 on 4/5/23 from baseline of ~1.4. He drinks about 1L of water per day. He reports his sister has kidney disease related to diabetes. The patient denies prior nephrologist, taking NSAIDs, herbal supplements, or new antibiotics, recreational drugs, recent episode of dehydration, diarrhea, nausea or vomiting, acute illness.     Significant family hx includes: sister- CKD 2/2 DM    Update 8/15/23:  - Presents with wife for follow-up of CKD  - Planning Cystolithotripsy, TURP pending cardiology clearance  - Reports no recent issues with stones. Being treated for UTI with augmentin.  - No other complaints at this time.     Update 10/16/24:  - Presents for follow-up of CKD and nephrolithiasis. Cr 1.7.  - S/p TURP and cystolitholapaxy on 12/22/23   - No malignancy per pathology  - He denies complaints today including SOB, swelling, urinary difficulty, hematuria, dysuria. No known passage of stones. Had follow-up with [facility] in April.   - Drinks about 2.5L of water per day    Update 1/17/25:  - Presents for follow-up. Cr improved to 1.3. Drinking more water.  - No complaints today. No urinary symptoms. No issues with stones.     Review of Systems   Respiratory:  Negative for shortness of breath.     Cardiovascular:  Negative for leg swelling.   Genitourinary:  Negative for difficulty urinating, dysuria and hematuria.   All other systems reviewed and are negative.      Objective:       Blood pressure (!) 150/85, pulse 76, weight 117 kg (257 lb 15 oz), SpO2 95%.  Physical Exam  Vitals reviewed.   Constitutional:       General: He is not in acute distress.  HENT:      Head: Normocephalic and atraumatic.   Eyes:      General: No scleral icterus.  Cardiovascular:      Rate and Rhythm: Normal rate.   Pulmonary:      Effort: Pulmonary effort is normal. No respiratory distress.   Musculoskeletal:      Right lower leg: No edema.      Left lower leg: No edema.   Skin:     General: Skin is warm and dry.   Neurological:      Mental Status: He is alert and oriented to person, place, and time.      Comments: Alert, speech clear, answers questions appropriately    Psychiatric:         Mood and Affect: Mood normal.         Behavior: Behavior normal.           Lab Results   Component Value Date    CREATININE 1.3 01/10/2025    URICACID 2.67 08/23/2023     Prot/Creat Ratio, Urine   Date Value Ref Range Status   01/10/2025 0.10 0.00 - 0.20 Final   08/08/2023 0.48 (H) 0.00 - 0.20 Final   05/16/2023 0.05 0.00 - 0.20 Final     Lab Results   Component Value Date     01/10/2025    K 4.1 01/10/2025    CO2 27 01/10/2025     01/10/2025     Lab Results   Component Value Date    .2 (H) 01/10/2025    CALCIUM 9.3 01/10/2025    PHOS 2.9 01/10/2025     Lab Results   Component Value Date    HGB 13.8 (L) 01/10/2025    WBC 7.67 01/10/2025    HCT 43.1 01/10/2025      Lab Results   Component Value Date    HGBA1C 6.2 (H) 09/18/2024     01/10/2025    BUN 16 01/10/2025     Lab Results   Component Value Date    LDLCALC 62.0 (L) 09/18/2024     CT Abdomen 4/1/23  FINDINGS:  Pulmonary arterial system: This is a good quality examination for the evaluation of pulmonary thromboembolism with good opacification of the pulmonary  arteries to the level of the segmental branches of the pulmonary arterial system. There is no evidence of acute pulmonary thromboembolism. No large saddle pulmonary embolism, enlargement of the main pulmonary artery, or secondary findings of right ventricular strain.  Aorta and heart: The heart is enlarged in size.  Multi chamber with calcifications in the mitral valve annulus.  Is no pericardial fluid.  No thoracic aortic aneurysm.  No thoracic aortic dissection.  Airways: Unremarkable.  Lymph nodes: No pathologically enlarged lymph nodes in the chest or axilla.  Lungs: Scattered ground-glass opacities with no nodular consistency.  No lobar consolidation.  No dominant nodule.  Otherwise, lungs are clear with no evidence of airspace consolidation, mass, or bronchiectasis.  No emphysematous lung architecture.  Pleura: No significant volume of pleural fluid or pneumothorax.  No pleural based masses.  Abdomen and pelvis: The liver, spleen, pancreas, adrenal glands and kidneys appear unremarkable, allowing for bilateral low-density lesions in the kidneys compatible with cortical cysts.  The abdominal aorta and vena cava are normal in caliber with minimal atherosclerotic disease in the aorta and iliac system.  The loops of large and small intestines appear unremarkable.  There is no free air or inflammation.  The urinary bladder contains a 2.25 cm spiculated calcified K shin/calcified lesion in the left side of the trigone with thickening along the posterior trigone.  The prostate gland is mildly enlarged with median lobe prominence but no invagination into the urinary bladder.  No pelvic mass or free fluid is evident.  Degenerative changes with sclerotic bone island again noted in the iliac wing left.  Lumbar stenosis at multiple levels due to a short pedicles and hypertrophic posterior element changes.  Total hip replacement on the right.     Impression:     1. No evidence of acute pulmonary thromboembolism.  2.  Scattered ground-glass opacities in the lungs.  Correlate for early pulmonary edema or pneumonitis.  3. No lobar consolidation or effusion.  4. Cardiac chamber enlargement.  5. Calcified spiculated lesion in the urinary bladder toward the left with thickened trigone.  While this could represent a bladder calculus, calcified bladder mass with bladder wall thickening is difficult to exclude.  Urology follow-up and interrogation is urged if not already performed.  6.  This report was flagged in Epic as abnormal.      Assessment:       1. CKD (chronic kidney disease) stage 2, GFR 60-89 ml/min    2. Nephrolithiasis    3. Proteinuria, unspecified type    4. Benign hypertension with CKD (chronic kidney disease) stage III    5. Prediabetes              Plan:   CKD stage 2  -  - Baseline Cr ~1.4, CKD clinically related to longstanding HTN. Cr primarily ranging 1.4-1.6 over the last year. Improved to 1.3.   - Following with Urology for renal stones  - Avoid NSAIDs, increase hydration, recommend further BP control     UPCR continue RASi   Acid-base WNL   Renal osteodystrophy PTH, Ca, and phos stable. Monitor PTH and Vit D.    Anemia WNL   DM No history. Now with preDM. Encouraged dietary and lifestyle changes for weight loss.    Lipid Management On statin   ESRD planning Provided education about the stages of CKD, usual progression, and need to monitor for and treat CKD-related disease in an effort to delay progression of CKD.     HTN   - Above goal. Has been better controlled on last readings. Ate high salt breakfast.   - coreg 25 BID, lasix 40 BID, spironolactone 12.5,  entresto per cardiology   - low Na diet     Nephrolithiasis  - Following with Urology - S/p TURP and cystolitholapaxy on 12/22/23   - Stone analysis in 2017 showed 100% CaOx stones   - H2O intake for UOP >2L per day, low Na diet, reduce animal protein  - Potential for dietician referral for kidney stones in future when dietician available through our clinic.    - No issues with stones since procedure. If so would recommend addition of potassium citrate.     All questions patient had were answered.  Asked if further questions. None. F/u in clinic 6 months  with labs and urine prior to next visit or sooner if needed.  ER for emergency concerns.    Summary of Plan:  - RTC 6 months with labs

## 2025-01-22 ENCOUNTER — OFFICE VISIT (OUTPATIENT)
Dept: CARDIOLOGY | Facility: CLINIC | Age: 70
End: 2025-01-22
Payer: MEDICARE

## 2025-01-22 VITALS
SYSTOLIC BLOOD PRESSURE: 121 MMHG | DIASTOLIC BLOOD PRESSURE: 69 MMHG | HEART RATE: 65 BPM | HEIGHT: 70 IN | WEIGHT: 257 LBS | BODY MASS INDEX: 36.79 KG/M2

## 2025-01-22 DIAGNOSIS — I48.3 TYPICAL ATRIAL FLUTTER: ICD-10-CM

## 2025-01-22 DIAGNOSIS — I50.22 CHRONIC SYSTOLIC CONGESTIVE HEART FAILURE: ICD-10-CM

## 2025-01-22 DIAGNOSIS — I10 ESSENTIAL HYPERTENSION: ICD-10-CM

## 2025-01-22 DIAGNOSIS — I70.0 AORTIC ATHEROSCLEROSIS: ICD-10-CM

## 2025-01-22 DIAGNOSIS — E66.01 CLASS 2 SEVERE OBESITY DUE TO EXCESS CALORIES WITH SERIOUS COMORBIDITY AND BODY MASS INDEX (BMI) OF 38.0 TO 38.9 IN ADULT: ICD-10-CM

## 2025-01-22 DIAGNOSIS — E66.812 CLASS 2 SEVERE OBESITY DUE TO EXCESS CALORIES WITH SERIOUS COMORBIDITY AND BODY MASS INDEX (BMI) OF 38.0 TO 38.9 IN ADULT: ICD-10-CM

## 2025-01-22 DIAGNOSIS — Z79.01 CURRENT USE OF LONG TERM ANTICOAGULATION: ICD-10-CM

## 2025-01-22 DIAGNOSIS — N18.31 STAGE 3A CHRONIC KIDNEY DISEASE: ICD-10-CM

## 2025-01-22 DIAGNOSIS — G47.33 OSA (OBSTRUCTIVE SLEEP APNEA): ICD-10-CM

## 2025-01-22 DIAGNOSIS — I48.19 PERSISTENT ATRIAL FIBRILLATION: ICD-10-CM

## 2025-01-22 DIAGNOSIS — R73.03 PREDIABETES: ICD-10-CM

## 2025-01-22 DIAGNOSIS — I50.42 CHRONIC COMBINED SYSTOLIC AND DIASTOLIC CHF, NYHA CLASS 2: Primary | ICD-10-CM

## 2025-01-22 RX ORDER — CARVEDILOL 25 MG/1
25 TABLET ORAL 2 TIMES DAILY WITH MEALS
Qty: 180 TABLET | Refills: 3 | Status: SHIPPED | OUTPATIENT
Start: 2025-01-22 | End: 2026-01-22

## 2025-01-22 RX ORDER — FUROSEMIDE 40 MG/1
40 TABLET ORAL 2 TIMES DAILY
Qty: 180 TABLET | Refills: 3 | Status: SHIPPED | OUTPATIENT
Start: 2025-01-22 | End: 2026-01-22

## 2025-01-22 NOTE — ASSESSMENT & PLAN NOTE
-encouraged weight loss  -discussed health risk associated w obesity   Body mass index is 36.88 kg/m². Morbid obesity complicates all aspects of disease management from diagnostic modalities to treatment. Weight loss encouraged and health benefits explained to patient.   -8 pound weight loss since last visit

## 2025-01-22 NOTE — PROGRESS NOTES
Subjective:    Patient ID:  Juvenal Bernal is a 69 y.o. male who presents for follow-up of No chief complaint on file.      PCP: Eli Snow MD     Referring Provider:     The patient location is: [place], [place]   The chief complaint leading to consultation is: follow up appt, HF management     Visit type: audiovisual    Face to Face time with patient: 20  10 minutes of total time spent on the encounter, which includes face to face time and non-face to face time preparing to see the patient (eg, review of tests), Obtaining and/or reviewing separately obtained history, Documenting clinical information in the electronic or other health record, Independently interpreting results (not separately reported) and communicating results to the patient/family/caregiver, or Care coordination (not separately reported).         Each patient to whom he or she provides medical services by telemedicine is:  (1) informed of the relationship between the physician and patient and the respective role of any other health care provider with respect to management of the patient; and (2) notified that he or she may decline to receive medical services by telemedicine and may withdraw from such care at any time.    Notes:     HPI: Patient is a 66 yo M w/H of HTN, A-fib, HFrEF ( LVEF 30-35%, improved to 55-60% ), MEHNAZ, obesity who presents today for f/u appt, HTN and HF management. He was last seen on 9/6/24 for f/u, HF management and was continued on medical therapy w max dose Entresto. Renal function remains stable. Repeat cardiac echo w LVEF improved to 55-60%. Patient denies CP, SOB, palpitations, orthopnea, PND, pre-syncope, LOC, swelling, or claudication. He notes snoring. Patient notes medication compliance without side effects.  He does monitor his home BP and now ranges 130s/ 70-80s. Patient also notes improved dietary compliance with low salt diet. He walks daily. He reports slight improvement in GUERRERO.       Past Medical  History:   Diagnosis Date    Anticoagulant long-term use     Arthritis     right hip    Atrial fibrillation     CHF (congestive heart failure)     Hypertension     Kidney stones     Mixed hyperlipidemia     Personal history of colonic polyps 03/22/2024     Past Surgical History:   Procedure Laterality Date    COLONOSCOPY      COLONOSCOPY N/A 03/22/2024    Procedure: COLONOSCOPY;  Surgeon: Luz Maria Chapman MD;  Location: [facility];  Service: Endoscopy;  Laterality: N/A;    KIDNEY STONE SURGERY      at [employer]    LITHOTRIPSY      OTHER SURGICAL HISTORY      hematoma(dried up blood) removed from around hip area-left side    TOTAL HIP ARTHROPLASTY Right 01/01/2013     Social History     Socioeconomic History    Marital status:    Tobacco Use    Smoking status: Never     Passive exposure: Never    Smokeless tobacco: Never   Substance and Sexual Activity    Alcohol use: No     Alcohol/week: 0.0 standard drinks of alcohol    Drug use: Never    Sexual activity: Yes     Partners: Female     Social Drivers of Health     Financial Resource Strain: Low Risk  (7/10/2024)    Overall Financial Resource Strain (CARDIA)     Difficulty of Paying Living Expenses: Not hard at all   Food Insecurity: No Food Insecurity (7/10/2024)    Hunger Vital Sign     Worried About Running Out of Food in the Last Year: Never true     Ran Out of Food in the Last Year: Never true   Transportation Needs: No Transportation Needs (7/10/2024)    PRAPARE - Transportation     Lack of Transportation (Medical): No     Lack of Transportation (Non-Medical): No   Physical Activity: Sufficiently Active (7/10/2024)    Exercise Vital Sign     Days of Exercise per Week: 4 days     Minutes of Exercise per Session: 60 min   Stress: No Stress Concern Present (7/10/2024)    [employer] - Occupational Stress Questionnaire     Feeling of Stress : Not at all   Housing Stability: Low Risk  (7/10/2024)    Housing Stability Vital  Sign     Unable to Pay for Housing in the Last Year: No     Homeless in the Last Year: No     Family History   Problem Relation Name Age of Onset    No Known Problems Mother      No Known Problems Father estranged     No Known Problems Sister x4     Chronic back pain Brother x2     Parkinsonism Brother x2     No Known Problems Daughter x3     No Known Problems Son x1        Review of patient's allergies indicates:  No Known Allergies    Medication List with Changes/Refills   Current Medications    ASPIRIN (ECOTRIN) 81 MG EC TABLET    Take 1 tablet (81 mg total) by mouth once daily.    ATORVASTATIN (LIPITOR) 40 MG TABLET    Take 1 tablet by mouth once daily    DICLOFENAC SODIUM (VOLTAREN ARTHRITIS PAIN) 1 % GEL    Apply 4 g topically once daily.    MULTIVIT-MINERALS/FOLIC ACID (MULTIVITAMIN GUMMIES ORAL)    Take 1 tablet by mouth 2 (two) times a day.    SACUBITRIL-VALSARTAN (ENTRESTO)  MG PER TABLET    Take 1 tablet by mouth 2 (two) times daily.    SPIRONOLACTONE (ALDACTONE) 25 MG TABLET    Take 1/2 (one-half) tablet by mouth once daily    TAMSULOSIN (FLOMAX) 0.4 MG CAP    Take 1 capsule by mouth.   Changed and/or Refilled Medications    Modified Medication Previous Medication    APIXABAN (ELIQUIS) 5 MG TAB apixaban (ELIQUIS) 5 mg Tab       Take 1 tablet (5 mg total) by mouth 2 (two) times daily.    Take 1 tablet (5 mg total) by mouth 2 (two) times daily.    CARVEDILOL (COREG) 25 MG TABLET carvediloL (COREG) 25 MG tablet       Take 1 tablet (25 mg total) by mouth 2 (two) times daily with meals.    Take 1 tablet (25 mg total) by mouth 2 (two) times daily with meals.    FUROSEMIDE (LASIX) 40 MG TABLET furosemide (LASIX) 40 MG tablet       Take 1 tablet (40 mg total) by mouth 2 (two) times daily.    Take 1 tablet (40 mg total) by mouth 2 (two) times daily.       Review of Systems   Constitutional: Negative for diaphoresis and fever.   HENT:  Negative for congestion and hearing loss.    Eyes:  Negative for blurred  "vision and pain.   Cardiovascular:  Negative for chest pain, claudication, dyspnea on exertion, near-syncope, palpitations and syncope.   Respiratory:  Negative for shortness of breath and sleep disturbances due to breathing.    Hematologic/Lymphatic: Negative for bleeding problem. Does not bruise/bleed easily.   Skin:  Negative for color change and poor wound healing.   Gastrointestinal:  Negative for abdominal pain and nausea.   Genitourinary:  Negative for bladder incontinence and flank pain.   Neurological:  Negative for focal weakness and light-headedness.        Objective:   /69 (BP Location: Left arm, Patient Position: Sitting)   Pulse 65   Ht 5' 10" (1.778 m)   Wt 116.6 kg (257 lb)   BMI 36.88 kg/m²    Physical Exam  Constitutional:       Appearance: He is well-developed. He is not diaphoretic.   HENT:      Head: Normocephalic and atraumatic.   Eyes:      General: No scleral icterus.  Neck:      Vascular: No JVD.   Musculoskeletal:      Cervical back: Normal range of motion and neck supple.   Skin:     Coloration: Skin is not pale.   Neurological:      Mental Status: He is alert and oriented to person, place, and time.   Psychiatric:         Behavior: Behavior normal.         Judgment: Judgment normal.           Cardiac echo 6/20/24  Summary    Left Ventricle: The left ventricle is normal in size. There is concentric remodeling. There is normal systolic function with a visually estimated ejection fraction of 55 - 60%. Unable to assess diastolic function due to atrial fibrillation.    Right Ventricle: Mild right ventricular enlargement. Systolic function is mildly reduced.    Left Atrium: Left atrium is moderately dilated.    Right Atrium: Right atrium is moderately dilated.    Aortic Valve: The aortic valve is a trileaflet valve. There is mild aortic valve sclerosis. There is mild aortic regurgitation.    Mitral Valve: There is moderate mitral annular calcification present.    Tricuspid Valve: " There is mild regurgitation.    Pulmonary Artery: There is pulmonary hypertension. The estimated pulmonary artery systolic pressure is 52 mmHg.    IVC/SVC: Normal venous pressure at 3 mmHg.    Pericardium: There is a trivial effusion.     Cardiac echo 10/2/23  Summary    Left Ventricle: The left ventricle is normal in size. There is concentric remodeling. Normal wall motion. There is mildly reduced systolic function. Ejection fraction by visual approximation is 45%. Unable to assess diastolic function due to atrial fibrillation.    Left Atrium: Left atrium is severely dilated.    Right Ventricle: Systolic function is normal.    Right Atrium: Right atrium is moderately dilated.    Tricuspid Valve: There is mild regurgitation.    Pulmonary Artery: The estimated pulmonary artery systolic pressure is 57 mmHg.    IVC/SVC: Normal venous pressure at 3 mmHg.    EKG 4/5/23- reviewed  A-Fib w T wave abnormality, HR 70    Cardiac echo 4/3/23-reviewed   Summary  The left ventricle is mildly enlarged with marked concentric hypertrophy and moderately decreased systolic function. The estimated ejection fraction is 30-35%.  There is an increased density to the thickened LV myocardium suggestive of an infiltrative process (e.g., amyloidosis)  Normal right ventricular size with normal right ventricular systolic function.  Severe biatrial enlargement.  Trivial pericardial effusion.  The pulmonary artery systolic pressure could not be estimated due to the lack of tricuspid insufficiency.  Normal central venous pressure (3 mmHg).  Atrial fibrillation observed    Assessment:       1. Chronic combined systolic and diastolic CHF, NYHA class 2    2. Essential hypertension    3. Persistent atrial fibrillation    4. Typical atrial flutter    5. Aortic atherosclerosis    6. Stage 3a chronic kidney disease    7. Current use of long term anticoagulation    8. Class 2 severe obesity due to excess calories with serious comorbidity and body mass  index (BMI) of 38.0 to 38.9 in adult    9. Prediabetes    10. MEHNAZ (obstructive sleep apnea)    11. Chronic systolic congestive heart failure           Plan:         Chronic combined systolic and diastolic CHF, NYHA class 2  Patient is identified as having Combined Systolic and Diastolic heart failure that is Chronic. CHF is currently controlled. Latest ECHO performed and demonstrates- Results for orders placed during the hospital encounter of 06/20/24    Echo Saline Bubble? No; Ultrasound enhancing contrast? Yes    Interpretation Summary    Left Ventricle: The left ventricle is normal in size. There is concentric remodeling. There is normal systolic function with a visually estimated ejection fraction of 55 - 60%. Unable to assess diastolic function due to atrial fibrillation.    Right Ventricle: Mild right ventricular enlargement. Systolic function is mildly reduced.    Left Atrium: Left atrium is moderately dilated.    Right Atrium: Right atrium is moderately dilated.    Aortic Valve: The aortic valve is a trileaflet valve. There is mild aortic valve sclerosis. There is mild aortic regurgitation.    Mitral Valve: There is moderate mitral annular calcification present.    Tricuspid Valve: There is mild regurgitation.    Pulmonary Artery: There is pulmonary hypertension. The estimated pulmonary artery systolic pressure is 52 mmHg.    IVC/SVC: Normal venous pressure at 3 mmHg.    Pericardium: There is a trivial effusion.  . Continue Beta Blocker, Furosemide, Aldactone, and ARNI and monitor clinical status closely. Monitor on telemetry. Patient is on CHF pathway.  Monitor strict Is&Os and daily weights.  Place on fluid restriction of 2 L. Cardiology has been consulted. Continue to stress to patient importance of self efficacy and  on diet for CHF. Last BNP reviewed- and noted below @LABRCNTIP(BNP,BNPTRIAGEBLO)@    Carvedilol 25 mg , furosemide 40 mg  BID, spironolactone  12.5 mg, continue Entresto     -continue titrate medications to GDMT   -Discussed lifestyle modifications- diet, exercise, weight loss  -instructed to weigh self daily, notify office if > 3 pound weight gain in 1 day or 5 pounds in 1 week     Essential hypertension  -Goal BP < 130/80, medication compliant  -home  BP 130s/70-80s  -controlled  -continue medication regimen:carvedilol 25 mg, furosemide 40 mg BID   -continue HF management  -discussed lifestyle modifications- diet, exercise, weight loss   -check BP twice daily and maintain log, bring log to all appts.     Persistent atrial fibrillation  -[id number]  -continue AC therapy-apixaban 5 mg      Atrial flutter  -continue AC therapy- apixaban 5 mg     Atrial fibrillation  -[id number]  -continue AC therapy-apixaban 5 mg (patient has received funding)       Aortic atherosclerosis  -continue ASA and statin     Stage 3a chronic kidney disease  -Baseline Cr 1.4, CKD clinically related to longstanding HTN   -followed by Nephrology   - Following with Urology for renal stones  - Avoid NSAIDs, increase hydration, recommend further BP control     Current use of long term anticoagulation  -continue Apixaban 5 mg     Class 2 severe obesity due to excess calories with serious comorbidity and body mass index (BMI) of 38.0 to 38.9 in adult  -encouraged weight loss  -discussed health risk associated w obesity   Body mass index is 36.88 kg/m². Morbid obesity complicates all aspects of disease management from diagnostic modalities to treatment. Weight loss encouraged and health benefits explained to patient.   -8 pound weight loss since last visit     Prediabetes  -followed by PCP  -A1c 6.2 9/18/24     MEHNAZ (obstructive sleep apnea)  -not currently on CPAP   -refer to sleep medicine to evaluate for sleep apnea update          Total duration of face to face visit time 30 minutes.  Total time spent counseling greater than fifty percent of total visit time.  Counseling included discussion regarding imaging  findings, diagnosis, possibilities, treatment options, risks and benefits.  The patient had many questions regarding the options and long-term effects      Miki Salgado,NIKKIE  Cardiology

## 2025-02-13 ENCOUNTER — PATIENT MESSAGE (OUTPATIENT)
Dept: FAMILY MEDICINE | Facility: CLINIC | Age: 70
End: 2025-02-13
Payer: MEDICARE

## 2025-03-20 ENCOUNTER — OFFICE VISIT (OUTPATIENT)
Dept: FAMILY MEDICINE | Facility: CLINIC | Age: 70
End: 2025-03-20
Payer: MEDICARE

## 2025-03-20 VITALS
OXYGEN SATURATION: 97 % | HEIGHT: 70 IN | TEMPERATURE: 98 F | RESPIRATION RATE: 19 BRPM | BODY MASS INDEX: 37.32 KG/M2 | WEIGHT: 260.69 LBS | SYSTOLIC BLOOD PRESSURE: 120 MMHG | HEART RATE: 65 BPM | DIASTOLIC BLOOD PRESSURE: 68 MMHG

## 2025-03-20 DIAGNOSIS — E66.01 CLASS 2 SEVERE OBESITY DUE TO EXCESS CALORIES WITH SERIOUS COMORBIDITY AND BODY MASS INDEX (BMI) OF 37.0 TO 37.9 IN ADULT: ICD-10-CM

## 2025-03-20 DIAGNOSIS — R73.03 PREDIABETES: ICD-10-CM

## 2025-03-20 DIAGNOSIS — I50.42 CHRONIC COMBINED SYSTOLIC AND DIASTOLIC CHF, NYHA CLASS 2: ICD-10-CM

## 2025-03-20 DIAGNOSIS — E66.812 CLASS 2 SEVERE OBESITY DUE TO EXCESS CALORIES WITH SERIOUS COMORBIDITY AND BODY MASS INDEX (BMI) OF 37.0 TO 37.9 IN ADULT: ICD-10-CM

## 2025-03-20 DIAGNOSIS — N18.31 STAGE 3A CHRONIC KIDNEY DISEASE: Primary | ICD-10-CM

## 2025-03-20 DIAGNOSIS — I10 ESSENTIAL HYPERTENSION: ICD-10-CM

## 2025-03-20 DIAGNOSIS — E55.9 VITAMIN D DEFICIENCY: ICD-10-CM

## 2025-03-20 DIAGNOSIS — N18.31 STAGE 3A CHRONIC KIDNEY DISEASE: ICD-10-CM

## 2025-03-20 PROCEDURE — 1101F PT FALLS ASSESS-DOCD LE1/YR: CPT | Mod: CPTII,S$GLB,, | Performed by: STUDENT IN AN ORGANIZED HEALTH CARE EDUCATION/TRAINING PROGRAM

## 2025-03-20 PROCEDURE — 3066F NEPHROPATHY DOC TX: CPT | Mod: CPTII,S$GLB,, | Performed by: STUDENT IN AN ORGANIZED HEALTH CARE EDUCATION/TRAINING PROGRAM

## 2025-03-20 PROCEDURE — 1126F AMNT PAIN NOTED NONE PRSNT: CPT | Mod: CPTII,S$GLB,, | Performed by: STUDENT IN AN ORGANIZED HEALTH CARE EDUCATION/TRAINING PROGRAM

## 2025-03-20 PROCEDURE — 3288F FALL RISK ASSESSMENT DOCD: CPT | Mod: CPTII,S$GLB,, | Performed by: STUDENT IN AN ORGANIZED HEALTH CARE EDUCATION/TRAINING PROGRAM

## 2025-03-20 PROCEDURE — 3074F SYST BP LT 130 MM HG: CPT | Mod: CPTII,S$GLB,, | Performed by: STUDENT IN AN ORGANIZED HEALTH CARE EDUCATION/TRAINING PROGRAM

## 2025-03-20 PROCEDURE — 3008F BODY MASS INDEX DOCD: CPT | Mod: CPTII,S$GLB,, | Performed by: STUDENT IN AN ORGANIZED HEALTH CARE EDUCATION/TRAINING PROGRAM

## 2025-03-20 PROCEDURE — 99214 OFFICE O/P EST MOD 30 MIN: CPT | Mod: S$GLB,,, | Performed by: STUDENT IN AN ORGANIZED HEALTH CARE EDUCATION/TRAINING PROGRAM

## 2025-03-20 PROCEDURE — 1160F RVW MEDS BY RX/DR IN RCRD: CPT | Mod: CPTII,S$GLB,, | Performed by: STUDENT IN AN ORGANIZED HEALTH CARE EDUCATION/TRAINING PROGRAM

## 2025-03-20 PROCEDURE — 3078F DIAST BP <80 MM HG: CPT | Mod: CPTII,S$GLB,, | Performed by: STUDENT IN AN ORGANIZED HEALTH CARE EDUCATION/TRAINING PROGRAM

## 2025-03-20 PROCEDURE — 1159F MED LIST DOCD IN RCRD: CPT | Mod: CPTII,S$GLB,, | Performed by: STUDENT IN AN ORGANIZED HEALTH CARE EDUCATION/TRAINING PROGRAM

## 2025-03-20 PROCEDURE — 99999 PR PBB SHADOW E&M-EST. PATIENT-LVL IV: CPT | Mod: PBBFAC,,, | Performed by: STUDENT IN AN ORGANIZED HEALTH CARE EDUCATION/TRAINING PROGRAM

## 2025-03-20 RX ORDER — ERGOCALCIFEROL 1.25 MG/1
50000 CAPSULE ORAL
Qty: 12 CAPSULE | Refills: 0 | Status: SHIPPED | OUTPATIENT
Start: 2025-03-20

## 2025-03-20 NOTE — PROGRESS NOTES
Subjective:   Chief complaints:    F/u on chronic conditions    HPI  68 yo M with prediabetes, microcytosis, CKD stage 3a, sHTN, A-fib, HFrEF ( now recovered LVEF 65% in 06/2024), OA right hip s/p replacement ( 2012) , MEHNAZ, obesity, prior TIA and CKD stage 3a with recent incidental finding of  benign bladder mass presents for f/u on chronic conditions . He endorses no new complaints today , compliant with all his current medications .  He also described his mood to be great today.    Past Medical History:   Diagnosis Date    Anticoagulant long-term use     Arthritis     right hip    Atrial fibrillation     CHF (congestive heart failure)     Hypertension     Kidney stones     Mixed hyperlipidemia     Personal history of colonic polyps 03/22/2024     Past Surgical History:   Procedure Laterality Date    COLONOSCOPY      COLONOSCOPY N/A 03/22/2024    Procedure: COLONOSCOPY;  Surgeon: Luz Maria Chapman MD;  Location: [facility];  Service: Endoscopy;  Laterality: N/A;    KIDNEY STONE SURGERY  11/2023    at [place]    LITHOTRIPSY      OTHER SURGICAL HISTORY      hematoma(dried up blood) removed from around hip area-left side    TOTAL HIP ARTHROPLASTY Right 01/01/2013     Social History     Socioeconomic History    Marital status:    Tobacco Use    Smoking status: Never     Passive exposure: Never    Smokeless tobacco: Never   Substance and Sexual Activity    Alcohol use: No     Alcohol/week: 0.0 standard drinks of alcohol    Drug use: Never    Sexual activity: Yes     Partners: Female     Social Drivers of Health     Financial Resource Strain: Low Risk  (7/10/2024)    Overall Financial Resource Strain (CARDIA)     Difficulty of Paying Living Expenses: Not hard at all   Food Insecurity: No Food Insecurity (7/10/2024)    Hunger Vital Sign     Worried About Running Out of Food in the Last Year: Never true     Ran Out of Food in the Last Year: Never true   Transportation Needs: No Transportation Needs (7/10/2024)     PRAPARE - Transportation     Lack of Transportation (Medical): No     Lack of Transportation (Non-Medical): No   Physical Activity: Sufficiently Active (7/10/2024)    Exercise Vital Sign     Days of Exercise per Week: 4 days     Minutes of Exercise per Session: 60 min   Stress: No Stress Concern Present (7/10/2024)    [employer] - Occupational Stress Questionnaire     Feeling of Stress : Not at all   Housing Stability: Unknown (7/10/2024)    Housing Stability Vital Sign     Unable to Pay for Housing in the Last Year: No     Homeless in the Last Year: No     Family History   Problem Relation Name Age of Onset    No Known Problems Mother      No Known Problems Father estranged     No Known Problems Sister x4     Chronic back pain Brother x2     Parkinsonism Brother x2     No Known Problems Daughter x3     No Known Problems Son x1        Review of patient's allergies indicates:  No Known Allergies    Medication List with Changes/Refills   New Medications    ERGOCALCIFEROL (ERGOCALCIFEROL) 50,000 UNIT CAP    Take 1 capsule (50,000 Units total) by mouth every 7 days.   Current Medications    APIXABAN (ELIQUIS) 5 MG TAB    Take 1 tablet (5 mg total) by mouth 2 (two) times daily.    ASPIRIN (ECOTRIN) 81 MG EC TABLET    Take 1 tablet (81 mg total) by mouth once daily.    ATORVASTATIN (LIPITOR) 40 MG TABLET    Take 1 tablet by mouth once daily    CARVEDILOL (COREG) 25 MG TABLET    Take 1 tablet (25 mg total) by mouth 2 (two) times daily with meals.    DICLOFENAC SODIUM (VOLTAREN ARTHRITIS PAIN) 1 % GEL    Apply 4 g topically once daily.    FUROSEMIDE (LASIX) 40 MG TABLET    Take 1 tablet (40 mg total) by mouth 2 (two) times daily.    MULTIVIT-MINERALS/FOLIC ACID (MULTIVITAMIN GUMMIES ORAL)    Take 1 tablet by mouth 2 (two) times a day.    SACUBITRIL-VALSARTAN (ENTRESTO)  MG PER TABLET    Take 1 tablet by mouth 2 (two) times daily.    SPIRONOLACTONE (ALDACTONE) 25 MG TABLET    Take 1/2  "(one-half) tablet by mouth once daily    TAMSULOSIN (FLOMAX) 0.4 MG CAP    Take 1 capsule by mouth.       Review of Systems   Constitutional: Negative for diaphoresis and fever.   HENT:  Negative for congestion and hearing loss.    Eyes:  Negative for blurred vision and pain.   Cardiovascular:  Negative for chest pain, claudication, dyspnea on exertion, leg swelling, near-syncope, palpitations and syncope.   Respiratory:  Negative for shortness of breath and sleep disturbances due to breathing.    Hematologic/Lymphatic: Negative for bleeding problem. Does not bruise/bleed easily.   Skin:  Negative for color change and poor wound healing.   Musculoskeletal:  Positive for arthritis.   Gastrointestinal:  Negative for abdominal pain and nausea.   Genitourinary:  Negative for bladder incontinence and flank pain.   Neurological:  Negative for focal weakness and light-headedness.        Objective:   /68 (BP Location: Right arm, Patient Position: Sitting)   Pulse 65   Temp 97.5 °F (36.4 °C) (Temporal)   Resp 19   Ht 5' 10" (1.778 m)   Wt 118.2 kg (260 lb 11.1 oz)   SpO2 97%   BMI 37.41 kg/m²    Physical Exam  Vitals reviewed.   Constitutional:       General: He is not in acute distress.     Appearance: He is well-developed. He is obese. He is not diaphoretic.   Eyes:      General: No scleral icterus.  Neck:      Vascular: No JVD.   Cardiovascular:      Rate and Rhythm: Rhythm irregular.      Pulses: Intact distal pulses.           Radial pulses are 2+ on the right side and 2+ on the left side.        Dorsalis pedis pulses are 2+ on the right side and 2+ on the left side.        Posterior tibial pulses are 2+ on the right side and 2+ on the left side.      Heart sounds: S1 normal and S2 normal. No murmur heard.     No friction rub. No gallop.   Pulmonary:      Effort: Pulmonary effort is normal. No respiratory distress.      Breath sounds: Normal breath sounds. No wheezing or rales.   Chest:      Chest wall: No " tenderness.   Abdominal:      General: Bowel sounds are normal. There is no distension.      Palpations: Abdomen is soft. There is no mass.      Tenderness: There is no abdominal tenderness. There is no rebound.   Musculoskeletal:         General: No tenderness. Normal range of motion.      Cervical back: Normal range of motion and neck supple.      Right lower leg: No edema (Trace bilaterally).      Left lower leg: No edema.   Skin:     General: Skin is warm and dry.      Coloration: Skin is pale.   Neurological:      General: No focal deficit present.      Mental Status: He is alert and oriented to person, place, and time.   Psychiatric:         Mood and Affect: Mood normal.         Behavior: Behavior normal.         Judgment: Judgment normal.           EKG 4/5/23- reviewed  A-Fib w T wave abnormality, HR 70      ECHO 06/2024    Left Ventricle: The left ventricle is normal in size. There is concentric remodeling. There is normal systolic function with a visually estimated ejection fraction of 55 - 60%. Unable to assess diastolic function due to atrial fibrillation.    Right Ventricle: Mild right ventricular enlargement. Systolic function is mildly reduced.    Left Atrium: Left atrium is moderately dilated.    Right Atrium: Right atrium is moderately dilated.    Aortic Valve: The aortic valve is a trileaflet valve. There is mild aortic valve sclerosis. There is mild aortic regurgitation.    Mitral Valve: There is moderate mitral annular calcification present.    Tricuspid Valve: There is mild regurgitation.    Pulmonary Artery: There is pulmonary hypertension. The estimated pulmonary artery systolic pressure is 52 mmHg.    IVC/SVC: Normal venous pressure at 3 mmHg.    Pericardium: There is a trivial effusion.    Assessment:       1. Essential hypertension    2. Chronic combined systolic and diastolic CHF, NYHA class 2    3. Stage 3a chronic kidney disease    4. Vitamin D deficiency    5. Prediabetes    6. Class 2  severe obesity due to excess calories with serious comorbidity and body mass index (BMI) of 37.0 to 37.9 in adult                 Plan:     -BP at goal, rate controlled   -euvolemic on examination   -c/w other regimen as prescribed: coreg, entresto, eliquis, aldactone, lasix BID  -counseled on life style modifications (low salt diet <2g, <2L fluid intake)  -lipid panel at goal, c/w lipitor nightly   -serum vit D low @ 10, high dose sent to pharmacy X 12 weeks, patient to continue with OTC 1000IU daily thereafter  -renal function improved, counseled against nephrotoxins , now follows renal team  -c/w daily exercise (30 minutes-45 minutes daily), healthy eating habit encouraged    34 minutes of total time spent on the encounter, which includes face to face time and non-face to face time preparing to see the patient (eg, review of tests), Obtaining and/or reviewing separately obtained history, Documenting clinical information in the electronic or other health record, Independently interpreting results (not separately reported) and communicating results to the patient or Care coordination (not separately reported).      Dr Eli Snow MD  Internal Medicine  [facility]

## 2025-04-28 ENCOUNTER — OFFICE VISIT (OUTPATIENT)
Dept: CARDIOLOGY | Facility: CLINIC | Age: 70
End: 2025-04-28
Payer: MEDICARE

## 2025-04-28 VITALS
HEIGHT: 70 IN | BODY MASS INDEX: 37.71 KG/M2 | DIASTOLIC BLOOD PRESSURE: 71 MMHG | HEART RATE: 60 BPM | OXYGEN SATURATION: 97 % | SYSTOLIC BLOOD PRESSURE: 96 MMHG | WEIGHT: 263.44 LBS

## 2025-04-28 DIAGNOSIS — R73.03 PREDIABETES: ICD-10-CM

## 2025-04-28 DIAGNOSIS — I70.0 AORTIC ATHEROSCLEROSIS: ICD-10-CM

## 2025-04-28 DIAGNOSIS — E66.812 CLASS 2 SEVERE OBESITY DUE TO EXCESS CALORIES WITH SERIOUS COMORBIDITY AND BODY MASS INDEX (BMI) OF 38.0 TO 38.9 IN ADULT: ICD-10-CM

## 2025-04-28 DIAGNOSIS — Z79.01 CURRENT USE OF LONG TERM ANTICOAGULATION: ICD-10-CM

## 2025-04-28 DIAGNOSIS — I48.19 PERSISTENT ATRIAL FIBRILLATION: ICD-10-CM

## 2025-04-28 DIAGNOSIS — N18.31 STAGE 3A CHRONIC KIDNEY DISEASE: ICD-10-CM

## 2025-04-28 DIAGNOSIS — I50.42 CHRONIC COMBINED SYSTOLIC AND DIASTOLIC CHF, NYHA CLASS 2: Primary | ICD-10-CM

## 2025-04-28 DIAGNOSIS — I10 ESSENTIAL HYPERTENSION: ICD-10-CM

## 2025-04-28 DIAGNOSIS — I48.3 TYPICAL ATRIAL FLUTTER: ICD-10-CM

## 2025-04-28 DIAGNOSIS — E66.01 CLASS 2 SEVERE OBESITY DUE TO EXCESS CALORIES WITH SERIOUS COMORBIDITY AND BODY MASS INDEX (BMI) OF 38.0 TO 38.9 IN ADULT: ICD-10-CM

## 2025-04-28 DIAGNOSIS — G47.33 OSA (OBSTRUCTIVE SLEEP APNEA): ICD-10-CM

## 2025-04-28 PROCEDURE — 3078F DIAST BP <80 MM HG: CPT | Mod: CPTII,S$GLB,,

## 2025-04-28 PROCEDURE — 99999 PR PBB SHADOW E&M-EST. PATIENT-LVL III: CPT | Mod: PBBFAC,,,

## 2025-04-28 PROCEDURE — 1126F AMNT PAIN NOTED NONE PRSNT: CPT | Mod: CPTII,S$GLB,,

## 2025-04-28 PROCEDURE — 1160F RVW MEDS BY RX/DR IN RCRD: CPT | Mod: CPTII,S$GLB,,

## 2025-04-28 PROCEDURE — 3288F FALL RISK ASSESSMENT DOCD: CPT | Mod: CPTII,S$GLB,,

## 2025-04-28 PROCEDURE — 1101F PT FALLS ASSESS-DOCD LE1/YR: CPT | Mod: CPTII,S$GLB,,

## 2025-04-28 PROCEDURE — 3008F BODY MASS INDEX DOCD: CPT | Mod: CPTII,S$GLB,,

## 2025-04-28 PROCEDURE — G2211 COMPLEX E/M VISIT ADD ON: HCPCS | Mod: S$GLB,,,

## 2025-04-28 PROCEDURE — 3074F SYST BP LT 130 MM HG: CPT | Mod: CPTII,S$GLB,,

## 2025-04-28 PROCEDURE — 1159F MED LIST DOCD IN RCRD: CPT | Mod: CPTII,S$GLB,,

## 2025-04-28 PROCEDURE — 99214 OFFICE O/P EST MOD 30 MIN: CPT | Mod: S$GLB,,,

## 2025-04-28 PROCEDURE — 3066F NEPHROPATHY DOC TX: CPT | Mod: CPTII,S$GLB,,

## 2025-04-28 NOTE — ASSESSMENT & PLAN NOTE
-Goal BP < 130/80, medication compliant  -home  BP 120s/70-80s  -controlled  -continue medication regimen:carvedilol 25 mg, furosemide 40 mg BID   -continue HF management  -discussed lifestyle modifications- diet, exercise, weight loss   -check BP twice daily and maintain log, bring log to all appts.

## 2025-04-28 NOTE — PROGRESS NOTES
Subjective:    Patient ID:  Juvenal Bernal is a 69 y.o. male who presents for follow-up of No chief complaint on file.      PCP: Eli Snow MD     Referring Provider:     HPI: Patient is a 70 yo M w/H of HTN, A-fib, HFrEF ( LVEF 30-35%, improved to 55-60% ), MEHNAZ, obesity who presents today for f/u appt, HTN and HF management. He was last seen on 1/22/25 for f/u, HTN, HF management and was continued on medical therapy w max dose Entresto. Renal function remains stable. Repeat cardiac echo w LVEF improved to 55-60%. Patient denies CP, SOB, palpitations, orthopnea, PND, pre-syncope, LOC, swelling, or claudication. He notes snoring. Patient notes medication compliance without side effects.  He does monitor his home BP and now ranges 120s/ 70-80s. Patient also notes improved dietary compliance with low salt diet. He walks daily. He reports slight improvement in [place].       Past Medical History:   Diagnosis Date    Anticoagulant long-term use     Arthritis     right hip    Atrial fibrillation     CHF (congestive heart failure)     Hypertension     Kidney stones     Mixed hyperlipidemia     Personal history of colonic polyps 03/22/2024     Past Surgical History:   Procedure Laterality Date    COLONOSCOPY      COLONOSCOPY N/A 03/22/2024    Procedure: COLONOSCOPY;  Surgeon: Luz Maria Chapman MD;  Location: [facility];  Service: Endoscopy;  Laterality: N/A;    KIDNEY STONE SURGERY  11/2023    at [place]    LITHOTRIPSY      OTHER SURGICAL HISTORY      hematoma(dried up blood) removed from around hip area-left side    TOTAL HIP ARTHROPLASTY Right 01/01/2013     Social History     Socioeconomic History    Marital status:    Tobacco Use    Smoking status: Never     Passive exposure: Never    Smokeless tobacco: Never   Substance and Sexual Activity    Alcohol use: No     Alcohol/week: 0.0 standard drinks of alcohol    Drug use: Never    Sexual activity: Yes     Partners: Female     Social Drivers of  Health     Financial Resource Strain: Low Risk  (7/10/2024)    Overall Financial Resource Strain (CARDIA)     Difficulty of Paying Living Expenses: Not hard at all   Food Insecurity: No Food Insecurity (7/10/2024)    Hunger Vital Sign     Worried About Running Out of Food in the Last Year: Never true     Ran Out of Food in the Last Year: Never true   Transportation Needs: No Transportation Needs (7/10/2024)    PRAPARE - Transportation     Lack of Transportation (Medical): No     Lack of Transportation (Non-Medical): No   Physical Activity: Sufficiently Active (7/10/2024)    Exercise Vital Sign     Days of Exercise per Week: 4 days     Minutes of Exercise per Session: 60 min   Stress: No Stress Concern Present (7/10/2024)    [employer] - Occupational Stress Questionnaire     Feeling of Stress : Not at all   Housing Stability: Unknown (7/10/2024)    Housing Stability Vital Sign     Unable to Pay for Housing in the Last Year: No     Homeless in the Last Year: No     Family History   Problem Relation Name Age of Onset    No Known Problems Mother      No Known Problems Father estranged     No Known Problems Sister x4     Chronic back pain Brother x2     Parkinsonism Brother x2     No Known Problems Daughter x3     No Known Problems Son x1        Review of patient's allergies indicates:  No Known Allergies    Medication List with Changes/Refills   Current Medications    APIXABAN (ELIQUIS) 5 MG TAB    Take 1 tablet (5 mg total) by mouth 2 (two) times daily.    ASPIRIN (ECOTRIN) 81 MG EC TABLET    Take 1 tablet (81 mg total) by mouth once daily.    ATORVASTATIN (LIPITOR) 40 MG TABLET    Take 1 tablet by mouth once daily    CARVEDILOL (COREG) 25 MG TABLET    Take 1 tablet (25 mg total) by mouth 2 (two) times daily with meals.    DICLOFENAC SODIUM (VOLTAREN ARTHRITIS PAIN) 1 % GEL    Apply 4 g topically once daily.    ERGOCALCIFEROL (ERGOCALCIFEROL) 50,000 UNIT CAP    Take 1 capsule (50,000 Units  "total) by mouth every 7 days.    FUROSEMIDE (LASIX) 40 MG TABLET    Take 1 tablet (40 mg total) by mouth 2 (two) times daily.    MULTIVIT-MINERALS/FOLIC ACID (MULTIVITAMIN GUMMIES ORAL)    Take 1 tablet by mouth 2 (two) times a day.    SACUBITRIL-VALSARTAN (ENTRESTO)  MG PER TABLET    Take 1 tablet by mouth 2 (two) times daily.    SPIRONOLACTONE (ALDACTONE) 25 MG TABLET    Take 1/2 (one-half) tablet by mouth once daily    TAMSULOSIN (FLOMAX) 0.4 MG CAP    Take 1 capsule by mouth.       Review of Systems   Constitutional: Negative for diaphoresis and fever.   HENT:  Negative for congestion and hearing loss.    Eyes:  Negative for blurred vision and pain.   Cardiovascular:  Negative for chest pain, claudication, dyspnea on exertion, near-syncope, palpitations and syncope.   Respiratory:  Negative for shortness of breath and sleep disturbances due to breathing.    Hematologic/Lymphatic: Negative for bleeding problem. Does not bruise/bleed easily.   Skin:  Negative for color change and poor wound healing.   Gastrointestinal:  Negative for abdominal pain and nausea.   Genitourinary:  Negative for bladder incontinence and flank pain.   Neurological:  Negative for focal weakness and light-headedness.        Objective:   BP 96/71 (BP Location: Left forearm, Patient Position: Sitting)   Pulse 60   Ht 5' 10" (1.778 m)   Wt 119.5 kg (263 lb 7.2 oz)   SpO2 97%   BMI 37.80 kg/m²    Physical Exam  Constitutional:       Appearance: He is well-developed. He is not diaphoretic.   HENT:      Head: Normocephalic and atraumatic.   Eyes:      General: No scleral icterus.     Pupils: Pupils are equal, round, and reactive to light.   Neck:      Vascular: No JVD.   Cardiovascular:      Rate and Rhythm: Normal rate. Rhythm irregular.      Pulses: Intact distal pulses.           Radial pulses are 2+ on the right side and 2+ on the left side.        Dorsalis pedis pulses are 2+ on the right side and 2+ on the left side.        " Posterior tibial pulses are 2+ on the right side and 2+ on the left side.      Heart sounds: S1 normal and S2 normal. Murmur heard.      No friction rub. No gallop.   Pulmonary:      Effort: Pulmonary effort is normal. No respiratory distress.      Breath sounds: Normal breath sounds. No wheezing or rales.   Chest:      Chest wall: No tenderness.   Abdominal:      General: Bowel sounds are normal. There is no distension.      Palpations: Abdomen is soft. There is no mass.      Tenderness: There is no abdominal tenderness. There is no rebound.   Musculoskeletal:         General: No tenderness. Normal range of motion.      Cervical back: Normal range of motion and neck supple.   Skin:     General: Skin is warm and dry.      Coloration: Skin is not pale.   Neurological:      Mental Status: He is alert and oriented to person, place, and time.      Coordination: Coordination normal.      Deep Tendon Reflexes: Reflexes normal.   Psychiatric:         Behavior: Behavior normal.         Judgment: Judgment normal.           Cardiac echo 6/20/24  Summary    Left Ventricle: The left ventricle is normal in size. There is concentric remodeling. There is normal systolic function with a visually estimated ejection fraction of 55 - 60%. Unable to assess diastolic function due to atrial fibrillation.    Right Ventricle: Mild right ventricular enlargement. Systolic function is mildly reduced.    Left Atrium: Left atrium is moderately dilated.    Right Atrium: Right atrium is moderately dilated.    Aortic Valve: The aortic valve is a trileaflet valve. There is mild aortic valve sclerosis. There is mild aortic regurgitation.    Mitral Valve: There is moderate mitral annular calcification present.    Tricuspid Valve: There is mild regurgitation.    Pulmonary Artery: There is pulmonary hypertension. The estimated pulmonary artery systolic pressure is 52 mmHg.    IVC/SVC: Normal venous pressure at 3 mmHg.    Pericardium: There is a trivial  effusion.     Cardiac echo 10/2/23  Summary    Left Ventricle: The left ventricle is normal in size. There is concentric remodeling. Normal wall motion. There is mildly reduced systolic function. Ejection fraction by visual approximation is 45%. Unable to assess diastolic function due to atrial fibrillation.    Left Atrium: Left atrium is severely dilated.    Right Ventricle: Systolic function is normal.    Right Atrium: Right atrium is moderately dilated.    Tricuspid Valve: There is mild regurgitation.    Pulmonary Artery: The estimated pulmonary artery systolic pressure is 57 mmHg.    IVC/SVC: Normal venous pressure at 3 mmHg.    EKG 4/5/23- reviewed  A-Fib w T wave abnormality, HR 70    Cardiac echo 4/3/23-reviewed   Summary  The left ventricle is mildly enlarged with marked concentric hypertrophy and moderately decreased systolic function. The estimated ejection fraction is 30-35%.  There is an increased density to the thickened LV myocardium suggestive of an infiltrative process (e.g., amyloidosis)  Normal right ventricular size with normal right ventricular systolic function.  Severe biatrial enlargement.  Trivial pericardial effusion.  The pulmonary artery systolic pressure could not be estimated due to the lack of tricuspid insufficiency.  Normal central venous pressure (3 mmHg).  Atrial fibrillation observed    Assessment:       1. Chronic combined systolic and diastolic CHF, NYHA class 2    2. Essential hypertension    3. Persistent atrial fibrillation    4. Typical atrial flutter    5. Aortic atherosclerosis    6. Stage 3a chronic kidney disease    7. Current use of long term anticoagulation    8. Class 2 severe obesity due to excess calories with serious comorbidity and body mass index (BMI) of 38.0 to 38.9 in adult    9. Prediabetes    10. MEHNAZ (obstructive sleep apnea)           Plan:         Chronic combined systolic and diastolic CHF, NYHA class 2  Patient is identified as having Combined Systolic  and Diastolic heart failure that is Chronic. CHF is currently controlled. Latest ECHO performed and demonstrates- Results for orders placed during the hospital encounter of 06/20/24    Echo Saline Bubble? No; Ultrasound enhancing contrast? Yes    Interpretation Summary    Left Ventricle: The left ventricle is normal in size. There is concentric remodeling. There is normal systolic function with a visually estimated ejection fraction of 55 - 60%. Unable to assess diastolic function due to atrial fibrillation.    Right Ventricle: Mild right ventricular enlargement. Systolic function is mildly reduced.    Left Atrium: Left atrium is moderately dilated.    Right Atrium: Right atrium is moderately dilated.    Aortic Valve: The aortic valve is a trileaflet valve. There is mild aortic valve sclerosis. There is mild aortic regurgitation.    Mitral Valve: There is moderate mitral annular calcification present.    Tricuspid Valve: There is mild regurgitation.    Pulmonary Artery: There is pulmonary hypertension. The estimated pulmonary artery systolic pressure is 52 mmHg.    IVC/SVC: Normal venous pressure at 3 mmHg.    Pericardium: There is a trivial effusion.  . Continue Beta Blocker, Furosemide, Aldactone, and ARNI and monitor clinical status closely. Monitor on telemetry. Patient is on CHF pathway.  Monitor strict Is&Os and daily weights.  Place on fluid restriction of 2 L. Cardiology has been consulted. Continue to stress to patient importance of self efficacy and  on diet for CHF. Last BNP reviewed- and noted below @LABRCNTIP(BNP,BNPTRIAGEBLO)@    Carvedilol 25 mg , furosemide 40 mg  BID, spironolactone  12.5 mg, continue Entresto    -continue titrate medications to GDMT   -Discussed lifestyle modifications- diet, exercise, weight loss  -instructed to weigh self daily, notify office if > 3 pound weight gain in 1 day or 5 pounds in 1 week     Essential hypertension  -Goal BP < 130/80, medication  compliant  -home  BP 120s/70-80s  -controlled  -continue medication regimen:carvedilol 25 mg, furosemide 40 mg BID   -continue HF management  -discussed lifestyle modifications- diet, exercise, weight loss   -check BP twice daily and maintain log, bring log to all appts.     Persistent atrial fibrillation  -[id number]- [id number]  -continue AC therapy-apixaban 5 mg      Atrial flutter  -continue AC therapy- apixaban 5 mg     Atrial fibrillation  -[id number]- [id number]  -continue AC therapy-apixaban 5 mg (patient has received funding)       Aortic atherosclerosis  -continue ASA and statin     Stage 3a chronic kidney disease  -Baseline Cr 1.4, CKD clinically related to longstanding HTN   -followed by Nephrology   - Following with Urology for renal stones  - Avoid NSAIDs, increase hydration, recommend further BP control     Current use of long term anticoagulation  -continue Apixaban 5 mg     Class 2 severe obesity due to excess calories with serious comorbidity and body mass index (BMI) of 38.0 to 38.9 in adult  -encouraged weight loss  -discussed health risk associated w obesity   Body mass index is 37.8 kg/m². Morbid obesity complicates all aspects of disease management from diagnostic modalities to treatment. Weight loss encouraged and health benefits explained to patient.   -5 pound weight gain since last visit     Prediabetes  -followed by PCP  -A1c 6.2 9/18/24     MEHNAZ (obstructive sleep apnea)  -not currently on CPAP   -refer to sleep medicine to evaluate for sleep apnea update          Total duration of face to face visit time 30 minutes.  Total time spent counseling greater than fifty percent of total visit time.  Counseling included discussion regarding imaging findings, diagnosis, possibilities, treatment options, risks and benefits.  The patient had many questions regarding the options and long-term effects      Miki Salgado,NIKKIE  Cardiology

## 2025-04-28 NOTE — ASSESSMENT & PLAN NOTE
-encouraged weight loss  -discussed health risk associated w obesity   Body mass index is 37.8 kg/m². Morbid obesity complicates all aspects of disease management from diagnostic modalities to treatment. Weight loss encouraged and health benefits explained to patient.   -5 pound weight gain since last visit

## 2025-04-29 ENCOUNTER — PATIENT MESSAGE (OUTPATIENT)
Dept: FAMILY MEDICINE | Facility: CLINIC | Age: 70
End: 2025-04-29
Payer: MEDICARE

## 2025-04-29 DIAGNOSIS — I10 ESSENTIAL HYPERTENSION: ICD-10-CM

## 2025-04-29 RX ORDER — CARVEDILOL 25 MG/1
25 TABLET ORAL 2 TIMES DAILY WITH MEALS
Qty: 180 TABLET | Refills: 3 | Status: SHIPPED | OUTPATIENT
Start: 2025-04-29 | End: 2026-04-29

## 2025-04-29 NOTE — TELEPHONE ENCOUNTER
No care due was identified.  [facility] Embedded Care Due Messages. Reference number: [id number].   4/29/2025 1:03:50 PM CDT

## 2025-06-12 ENCOUNTER — PATIENT MESSAGE (OUTPATIENT)
Dept: CARDIOLOGY | Facility: CLINIC | Age: 70
End: 2025-06-12
Payer: MEDICARE

## 2025-06-14 DIAGNOSIS — I50.22 CHRONIC SYSTOLIC CONGESTIVE HEART FAILURE: ICD-10-CM

## 2025-06-14 RX ORDER — ATORVASTATIN CALCIUM 40 MG/1
40 TABLET, FILM COATED ORAL
Qty: 90 TABLET | Refills: 1 | Status: SHIPPED | OUTPATIENT
Start: 2025-06-14

## 2025-06-14 NOTE — TELEPHONE ENCOUNTER
No care due was identified.  [facility] Embedded Care Due Messages. Reference number: [id number].   6/14/2025 7:05:47 AM CDT

## 2025-06-14 NOTE — TELEPHONE ENCOUNTER
Refill Decision Note   Juvenal Bernal  is requesting a refill authorization.  Brief Assessment and Rationale for Refill:  Approve     Medication Therapy Plan:         Comments:     Note composed:12:37 PM 06/14/2025

## 2025-06-16 RX ORDER — SPIRONOLACTONE 25 MG/1
12.5 TABLET ORAL
Qty: 45 TABLET | Refills: 2 | Status: SHIPPED | OUTPATIENT
Start: 2025-06-16

## 2025-06-16 NOTE — ASSESSMENT & PLAN NOTE
Patient is identified as having Combined Systolic and Diastolic heart failure that is Chronic. CHF is currently controlled. Latest ECHO performed and demonstrates- Results for orders placed during the hospital encounter of 06/20/24    Echo Saline Bubble? No; Ultrasound enhancing contrast? Yes    Interpretation Summary    Left Ventricle: The left ventricle is normal in size. There is concentric remodeling. There is normal systolic function with a visually estimated ejection fraction of 55 - 60%. Unable to assess diastolic function due to atrial fibrillation.    Right Ventricle: Mild right ventricular enlargement. Systolic function is mildly reduced.    Left Atrium: Left atrium is moderately dilated.    Right Atrium: Right atrium is moderately dilated.    Aortic Valve: The aortic valve is a trileaflet valve. There is mild aortic valve sclerosis. There is mild aortic regurgitation.    Mitral Valve: There is moderate mitral annular calcification present.    Tricuspid Valve: There is mild regurgitation.    Pulmonary Artery: There is pulmonary hypertension. The estimated pulmonary artery systolic pressure is 52 mmHg.    IVC/SVC: Normal venous pressure at 3 mmHg.    Pericardium: There is a trivial effusion.  . Continue Beta Blocker, Furosemide, Aldactone, and ARNI and monitor clinical status closely. Monitor on telemetry. Patient is on CHF pathway.  Monitor strict Is&Os and daily weights.  Place on fluid restriction of 2 L. Cardiology has been consulted. Continue to stress to patient importance of self efficacy and  on diet for CHF. Last BNP reviewed- and noted below @LABRCNTIP(BNP,BNPTRIAGEBLO)@    Carvedilol 25 mg , furosemide 40 mg  BID, spironolactone  12.5 mg, continue Entresto    -continue titrate medications to GDMT   -Discussed lifestyle modifications- diet, exercise, weight loss  -instructed to weigh self daily, notify office if > 3 pound weight gain in 1 day or 5 pounds in 1 week    No

## 2025-06-24 ENCOUNTER — PATIENT MESSAGE (OUTPATIENT)
Dept: CARDIOLOGY | Facility: CLINIC | Age: 70
End: 2025-06-24
Payer: MEDICARE

## 2025-06-25 ENCOUNTER — TELEPHONE (OUTPATIENT)
Dept: CARDIOLOGY | Facility: CLINIC | Age: 70
End: 2025-06-25
Payer: MEDICARE

## 2025-06-25 NOTE — TELEPHONE ENCOUNTER
Contacted Ms. Reynaga, Pts sister to let her know his paperwork was faxed off this morning for his [employer].    Thank you,    Sruthi Meng  Medical Assistant   [facility]   [phone]-Phone  [phone]-Fax

## 2025-06-27 ENCOUNTER — PATIENT MESSAGE (OUTPATIENT)
Dept: CARDIOLOGY | Facility: CLINIC | Age: 70
End: 2025-06-27
Payer: MEDICARE

## 2025-07-30 ENCOUNTER — OFFICE VISIT (OUTPATIENT)
Dept: CARDIOLOGY | Facility: CLINIC | Age: 70
End: 2025-07-30
Payer: MEDICARE

## 2025-07-30 VITALS
WEIGHT: 271 LBS | HEIGHT: 70 IN | SYSTOLIC BLOOD PRESSURE: 126 MMHG | HEART RATE: 62 BPM | TEMPERATURE: 98 F | OXYGEN SATURATION: 99 % | DIASTOLIC BLOOD PRESSURE: 84 MMHG | BODY MASS INDEX: 38.8 KG/M2

## 2025-07-30 DIAGNOSIS — I48.19 PERSISTENT ATRIAL FIBRILLATION: ICD-10-CM

## 2025-07-30 DIAGNOSIS — Z79.01 CURRENT USE OF LONG TERM ANTICOAGULATION: ICD-10-CM

## 2025-07-30 DIAGNOSIS — I70.0 AORTIC ATHEROSCLEROSIS: ICD-10-CM

## 2025-07-30 DIAGNOSIS — E66.01 CLASS 2 SEVERE OBESITY DUE TO EXCESS CALORIES WITH SERIOUS COMORBIDITY AND BODY MASS INDEX (BMI) OF 38.0 TO 38.9 IN ADULT: ICD-10-CM

## 2025-07-30 DIAGNOSIS — N18.31 STAGE 3A CHRONIC KIDNEY DISEASE: ICD-10-CM

## 2025-07-30 DIAGNOSIS — G47.33 OSA (OBSTRUCTIVE SLEEP APNEA): ICD-10-CM

## 2025-07-30 DIAGNOSIS — I48.3 TYPICAL ATRIAL FLUTTER: ICD-10-CM

## 2025-07-30 DIAGNOSIS — I10 ESSENTIAL HYPERTENSION: ICD-10-CM

## 2025-07-30 DIAGNOSIS — E66.812 CLASS 2 SEVERE OBESITY DUE TO EXCESS CALORIES WITH SERIOUS COMORBIDITY AND BODY MASS INDEX (BMI) OF 38.0 TO 38.9 IN ADULT: ICD-10-CM

## 2025-07-30 DIAGNOSIS — I50.42 CHRONIC COMBINED SYSTOLIC AND DIASTOLIC CHF, NYHA CLASS 2: Primary | ICD-10-CM

## 2025-07-30 DIAGNOSIS — R73.03 PREDIABETES: ICD-10-CM

## 2025-07-30 PROCEDURE — 3079F DIAST BP 80-89 MM HG: CPT | Mod: CPTII,S$GLB,,

## 2025-07-30 PROCEDURE — 1101F PT FALLS ASSESS-DOCD LE1/YR: CPT | Mod: CPTII,S$GLB,,

## 2025-07-30 PROCEDURE — 99999 PR PBB SHADOW E&M-EST. PATIENT-LVL III: CPT | Mod: PBBFAC,,,

## 2025-07-30 PROCEDURE — 1160F RVW MEDS BY RX/DR IN RCRD: CPT | Mod: CPTII,S$GLB,,

## 2025-07-30 PROCEDURE — 1159F MED LIST DOCD IN RCRD: CPT | Mod: CPTII,S$GLB,,

## 2025-07-30 PROCEDURE — 3066F NEPHROPATHY DOC TX: CPT | Mod: CPTII,S$GLB,,

## 2025-07-30 PROCEDURE — 3288F FALL RISK ASSESSMENT DOCD: CPT | Mod: CPTII,S$GLB,,

## 2025-07-30 PROCEDURE — 3074F SYST BP LT 130 MM HG: CPT | Mod: CPTII,S$GLB,,

## 2025-07-30 PROCEDURE — 1126F AMNT PAIN NOTED NONE PRSNT: CPT | Mod: CPTII,S$GLB,,

## 2025-07-30 PROCEDURE — G2211 COMPLEX E/M VISIT ADD ON: HCPCS | Mod: S$GLB,,,

## 2025-07-30 PROCEDURE — 3008F BODY MASS INDEX DOCD: CPT | Mod: CPTII,S$GLB,,

## 2025-07-30 PROCEDURE — 99214 OFFICE O/P EST MOD 30 MIN: CPT | Mod: S$GLB,,,

## 2025-07-30 RX ORDER — SACUBITRIL AND VALSARTAN 97; 103 MG/1; MG/1
1 TABLET, FILM COATED ORAL 2 TIMES DAILY
Qty: 180 TABLET | Refills: 3 | Status: SHIPPED | OUTPATIENT
Start: 2025-07-30

## 2025-07-30 NOTE — ASSESSMENT & PLAN NOTE
-[id number]- 3  -continue AC therapy-apixaban 5 mg (patient has received funding)     
-Baseline Cr 1.4, CKD clinically related to longstanding HTN   -followed by Nephrology   - Following with Urology for renal stones  - Avoid NSAIDs, increase hydration, recommend further BP control   
-Goal BP < 130/80, medication compliant  -home  BP 120s/70-80s  -controlled  -continue medication regimen:carvedilol 25 mg, furosemide 40 mg BID   -continue HF management  -discussed lifestyle modifications- diet, exercise, weight loss   -check BP twice daily and maintain log, bring log to all appts.   
-[id number]- 3  -continue AC therapy-apixaban 5 mg    
-continue AC therapy- apixaban 5 mg   
-continue ASA and statin   
-continue Apixaban 5 mg   
-encouraged weight loss  -discussed health risk associated w obesity   Body mass index is 38.88 kg/m². Morbid obesity complicates all aspects of disease management from diagnostic modalities to treatment. Weight loss encouraged and health benefits explained to patient.   -5 pound weight gain since last visit   
-followed by PCP  -A1c 6.2 9/18/24   
-not currently on CPAP   -refer to sleep medicine to evaluate for sleep apnea update    
Patient is identified as having Combined Systolic and Diastolic heart failure that is Chronic. CHF is currently controlled. Latest ECHO performed and demonstrates- Results for orders placed during the hospital encounter of 06/20/24    Echo Saline Bubble? No; Ultrasound enhancing contrast? Yes    Interpretation Summary    Left Ventricle: The left ventricle is normal in size. There is concentric remodeling. There is normal systolic function with a visually estimated ejection fraction of 55 - 60%. Unable to assess diastolic function due to atrial fibrillation.    Right Ventricle: Mild right ventricular enlargement. Systolic function is mildly reduced.    Left Atrium: Left atrium is moderately dilated.    Right Atrium: Right atrium is moderately dilated.    Aortic Valve: The aortic valve is a trileaflet valve. There is mild aortic valve sclerosis. There is mild aortic regurgitation.    Mitral Valve: There is moderate mitral annular calcification present.    Tricuspid Valve: There is mild regurgitation.    Pulmonary Artery: There is pulmonary hypertension. The estimated pulmonary artery systolic pressure is 52 mmHg.    IVC/SVC: Normal venous pressure at 3 mmHg.    Pericardium: There is a trivial effusion.  . Continue Beta Blocker, Furosemide, Aldactone, and ARNI and monitor clinical status closely. Monitor on telemetry. Patient is on CHF pathway.  Monitor strict Is&Os and daily weights.  Place on fluid restriction of 2 L. Cardiology has been consulted. Continue to stress to patient importance of self efficacy and  on diet for CHF. Last BNP reviewed- and noted below @LABRCNTIP(BNP,BNPTRIAGEBLO)@    Carvedilol 25 mg , furosemide 40 mg  BID, spironolactone  12.5 mg, continue Entresto    -continue titrate medications to GDMT   -Discussed lifestyle modifications- diet, exercise, weight loss  -instructed to weigh self daily, notify office if > 3 pound weight gain in 1 day or 5 pounds in 1 week   
68

## 2025-07-30 NOTE — PROGRESS NOTES
Subjective:    Patient ID:  Juvenal Bernal is a 70 y.o. male who presents for follow-up of Follow-up (3 month f/u/No problems since his last vsiit)      PCP: Eli Snow MD     Referring Provider:     HPI: Patient is a 68 yo M w/H of HTN, A-fib, HFrEF ( LVEF 30-35%, improved to 55-60% ), MEHNAZ, obesity who presents today for f/u appt, HTN and HF management. He was last seen on 4/28/25 for f/u, HTN, HF management and was continued on medical therapy w max dose Entresto. Renal function remains stable. Repeat cardiac echo w LVEF improved to 55-60%. Patient denies CP, SOB, palpitations, orthopnea, PND, pre-syncope, LOC, swelling, or claudication. He notes snoring. Patient notes medication compliance without side effects.  He does monitor his home BP and now ranges 120s/ 70-80s. Patient also notes improved dietary compliance with low salt diet. He walks daily. He reports slight improvement in GUERRERO.       Past Medical History:   Diagnosis Date    Anticoagulant long-term use     Arthritis     right hip    Atrial fibrillation     CHF (congestive heart failure)     Hypertension     Kidney stones     Mixed hyperlipidemia     Personal history of colonic polyps 03/22/2024     Past Surgical History:   Procedure Laterality Date    COLONOSCOPY      COLONOSCOPY N/A 03/22/2024    Procedure: COLONOSCOPY;  Surgeon: Luz Maria Chapman MD;  Location: [facility];  Service: Endoscopy;  Laterality: N/A;    KIDNEY STONE SURGERY  11/2023    at [place]    LITHOTRIPSY      OTHER SURGICAL HISTORY      hematoma(dried up blood) removed from around hip area-left side    TOTAL HIP ARTHROPLASTY Right 01/01/2013     Social History     Socioeconomic History    Marital status:    Tobacco Use    Smoking status: Never     Passive exposure: Never    Smokeless tobacco: Never   Substance and Sexual Activity    Alcohol use: No     Alcohol/week: 0.0 standard drinks of alcohol    Drug use: Never    Sexual activity: Yes     Partners:  Female     Social Drivers of Health     Financial Resource Strain: Low Risk  (7/10/2024)    Overall Financial Resource Strain (CARDIA)     Difficulty of Paying Living Expenses: Not hard at all   Food Insecurity: No Food Insecurity (7/10/2024)    Hunger Vital Sign     Worried About Running Out of Food in the Last Year: Never true     Ran Out of Food in the Last Year: Never true   Transportation Needs: No Transportation Needs (7/10/2024)    PRAPARE - Transportation     Lack of Transportation (Medical): No     Lack of Transportation (Non-Medical): No   Physical Activity: Sufficiently Active (7/10/2024)    Exercise Vital Sign     Days of Exercise per Week: 4 days     Minutes of Exercise per Session: 60 min   Stress: No Stress Concern Present (7/10/2024)    [employer] - Occupational Stress Questionnaire     Feeling of Stress : Not at all   Housing Stability: Unknown (7/10/2024)    Housing Stability Vital Sign     Unable to Pay for Housing in the Last Year: No     Homeless in the Last Year: No     Family History   Problem Relation Name Age of Onset    No Known Problems Mother      No Known Problems Father estranged     No Known Problems Sister x4     Chronic back pain Brother x2     Parkinsonism Brother x2     No Known Problems Daughter x3     No Known Problems Son x1        Review of patient's allergies indicates:  No Known Allergies    Medication List with Changes/Refills   Current Medications    ASPIRIN (ECOTRIN) 81 MG EC TABLET    Take 1 tablet (81 mg total) by mouth once daily.    ATORVASTATIN (LIPITOR) 40 MG TABLET    Take 1 tablet by mouth once daily    CARVEDILOL (COREG) 25 MG TABLET    Take 1 tablet (25 mg total) by mouth 2 (two) times daily with meals.    DICLOFENAC SODIUM (VOLTAREN ARTHRITIS PAIN) 1 % GEL    Apply 4 g topically once daily.    ERGOCALCIFEROL (ERGOCALCIFEROL) 50,000 UNIT CAP    Take 1 capsule (50,000 Units total) by mouth every 7 days.    FUROSEMIDE (LASIX) 40 MG  "TABLET    Take 1 tablet (40 mg total) by mouth 2 (two) times daily.    MULTIVIT-MINERALS/FOLIC ACID (MULTIVITAMIN GUMMIES ORAL)    Take 1 tablet by mouth 2 (two) times a day.    SPIRONOLACTONE (ALDACTONE) 25 MG TABLET    Take 1/2 (one-half) tablet by mouth once daily    TAMSULOSIN (FLOMAX) 0.4 MG CAP    Take 1 capsule by mouth.   Changed and/or Refilled Medications    Modified Medication Previous Medication    APIXABAN (ELIQUIS) 5 MG TAB apixaban (ELIQUIS) 5 mg Tab       Take 1 tablet (5 mg total) by mouth 2 (two) times daily.    Take 1 tablet (5 mg total) by mouth 2 (two) times daily.    SACUBITRIL-VALSARTAN (ENTRESTO)  MG PER TABLET sacubitriL-valsartan (ENTRESTO)  mg per tablet       Take 1 tablet by mouth 2 (two) times daily.    Take 1 tablet by mouth 2 (two) times daily.       Review of Systems   Constitutional: Negative for diaphoresis and fever.   HENT:  Negative for congestion and hearing loss.    Eyes:  Negative for blurred vision and pain.   Cardiovascular:  Negative for chest pain, claudication, dyspnea on exertion, near-syncope, palpitations and syncope.   Respiratory:  Negative for shortness of breath and sleep disturbances due to breathing.    Hematologic/Lymphatic: Negative for bleeding problem. Does not bruise/bleed easily.   Skin:  Negative for color change and poor wound healing.   Gastrointestinal:  Negative for abdominal pain and nausea.   Genitourinary:  Negative for bladder incontinence and flank pain.   Neurological:  Negative for focal weakness and light-headedness.        Objective:   /84 (BP Location: Left arm, Patient Position: Sitting)   Pulse 62   Temp 97.8 °F (36.6 °C) (Oral)   Ht 5' 10" (1.778 m)   Wt 122.9 kg (271 lb)   SpO2 99%   BMI 38.88 kg/m²    Physical Exam  Constitutional:       Appearance: He is well-developed. He is not diaphoretic.   HENT:      Head: Normocephalic and atraumatic.   Eyes:      General: No scleral icterus.     Pupils: Pupils are equal, " round, and reactive to light.   Neck:      Vascular: No JVD.   Cardiovascular:      Rate and Rhythm: Normal rate. Rhythm irregular.      Pulses: Intact distal pulses.           Radial pulses are 2+ on the right side and 2+ on the left side.        Dorsalis pedis pulses are 2+ on the right side and 2+ on the left side.        Posterior tibial pulses are 2+ on the right side and 2+ on the left side.      Heart sounds: S1 normal and S2 normal. Murmur heard.      No friction rub. No gallop.   Pulmonary:      Effort: Pulmonary effort is normal. No respiratory distress.      Breath sounds: Normal breath sounds. No wheezing or rales.   Chest:      Chest wall: No tenderness.   Abdominal:      General: Bowel sounds are normal. There is no distension.      Palpations: Abdomen is soft. There is no mass.      Tenderness: There is no abdominal tenderness. There is no rebound.   Musculoskeletal:         General: No tenderness. Normal range of motion.      Cervical back: Normal range of motion and neck supple.   Skin:     General: Skin is warm and dry.      Coloration: Skin is not pale.   Neurological:      Mental Status: He is alert and oriented to person, place, and time.      Coordination: Coordination normal.      Deep Tendon Reflexes: Reflexes normal.   Psychiatric:         Behavior: Behavior normal.         Judgment: Judgment normal.           Cardiac echo 6/20/24  Summary    Left Ventricle: The left ventricle is normal in size. There is concentric remodeling. There is normal systolic function with a visually estimated ejection fraction of 55 - 60%. Unable to assess diastolic function due to atrial fibrillation.    Right Ventricle: Mild right ventricular enlargement. Systolic function is mildly reduced.    Left Atrium: Left atrium is moderately dilated.    Right Atrium: Right atrium is moderately dilated.    Aortic Valve: The aortic valve is a trileaflet valve. There is mild aortic valve sclerosis. There is mild aortic  regurgitation.    Mitral Valve: There is moderate mitral annular calcification present.    Tricuspid Valve: There is mild regurgitation.    Pulmonary Artery: There is pulmonary hypertension. The estimated pulmonary artery systolic pressure is 52 mmHg.    IVC/SVC: Normal venous pressure at 3 mmHg.    Pericardium: There is a trivial effusion.     Cardiac echo 10/2/23  Summary    Left Ventricle: The left ventricle is normal in size. There is concentric remodeling. Normal wall motion. There is mildly reduced systolic function. Ejection fraction by visual approximation is 45%. Unable to assess diastolic function due to atrial fibrillation.    Left Atrium: Left atrium is severely dilated.    Right Ventricle: Systolic function is normal.    Right Atrium: Right atrium is moderately dilated.    Tricuspid Valve: There is mild regurgitation.    Pulmonary Artery: The estimated pulmonary artery systolic pressure is 57 mmHg.    IVC/SVC: Normal venous pressure at 3 mmHg.    EKG 4/5/23- reviewed  A-Fib w T wave abnormality, HR 70    Cardiac echo 4/3/23-reviewed   Summary  The left ventricle is mildly enlarged with marked concentric hypertrophy and moderately decreased systolic function. The estimated ejection fraction is 30-35%.  There is an increased density to the thickened LV myocardium suggestive of an infiltrative process (e.g., amyloidosis)  Normal right ventricular size with normal right ventricular systolic function.  Severe biatrial enlargement.  Trivial pericardial effusion.  The pulmonary artery systolic pressure could not be estimated due to the lack of tricuspid insufficiency.  Normal central venous pressure (3 mmHg).  Atrial fibrillation observed    Assessment:       1. Chronic combined systolic and diastolic CHF, NYHA class 2    2. Essential hypertension    3. Persistent atrial fibrillation    4. Typical atrial flutter    5. Aortic atherosclerosis    6. Stage 3a chronic kidney disease    7. Current use of long term  anticoagulation    8. Class 2 severe obesity due to excess calories with serious comorbidity and body mass index (BMI) of 38.0 to 38.9 in adult    9. Prediabetes    10. MEHNAZ (obstructive sleep apnea)             Plan:         Chronic combined systolic and diastolic CHF, NYHA class 2  Patient is identified as having Combined Systolic and Diastolic heart failure that is Chronic. CHF is currently controlled. Latest ECHO performed and demonstrates- Results for orders placed during the hospital encounter of 06/20/24    Echo Saline Bubble? No; Ultrasound enhancing contrast? Yes    Interpretation Summary    Left Ventricle: The left ventricle is normal in size. There is concentric remodeling. There is normal systolic function with a visually estimated ejection fraction of 55 - 60%. Unable to assess diastolic function due to atrial fibrillation.    Right Ventricle: Mild right ventricular enlargement. Systolic function is mildly reduced.    Left Atrium: Left atrium is moderately dilated.    Right Atrium: Right atrium is moderately dilated.    Aortic Valve: The aortic valve is a trileaflet valve. There is mild aortic valve sclerosis. There is mild aortic regurgitation.    Mitral Valve: There is moderate mitral annular calcification present.    Tricuspid Valve: There is mild regurgitation.    Pulmonary Artery: There is pulmonary hypertension. The estimated pulmonary artery systolic pressure is 52 mmHg.    IVC/SVC: Normal venous pressure at 3 mmHg.    Pericardium: There is a trivial effusion.  . Continue Beta Blocker, Furosemide, Aldactone, and ARNI and monitor clinical status closely. Monitor on telemetry. Patient is on CHF pathway.  Monitor strict Is&Os and daily weights.  Place on fluid restriction of 2 L. Cardiology has been consulted. Continue to stress to patient importance of self efficacy and  on diet for CHF. Last BNP reviewed- and noted below @LABRCNTIP(BNP,BNPTRIAGEBLO)@    Carvedilol 25 mg , furosemide 40 mg   BID, spironolactone  12.5 mg, continue Entresto    -continue titrate medications to GDMT   -Discussed lifestyle modifications- diet, exercise, weight loss  -instructed to weigh self daily, notify office if > 3 pound weight gain in 1 day or 5 pounds in 1 week     Essential hypertension  -Goal BP < 130/80, medication compliant  -home  BP 120s/70-80s  -controlled  -continue medication regimen:carvedilol 25 mg, furosemide 40 mg BID   -continue HF management  -discussed lifestyle modifications- diet, exercise, weight loss   -check BP twice daily and maintain log, bring log to all appts.     Persistent atrial fibrillation  -[id number]- 3  -continue AC therapy-apixaban 5 mg      Atrial flutter  -continue AC therapy- apixaban 5 mg     Atrial fibrillation  -[id number]- 3  -continue AC therapy-apixaban 5 mg (patient has received funding)       Aortic atherosclerosis  -continue ASA and statin     Stage 3a chronic kidney disease  -Baseline Cr 1.4, CKD clinically related to longstanding HTN   -followed by Nephrology   - Following with Urology for renal stones  - Avoid NSAIDs, increase hydration, recommend further BP control     Current use of long term anticoagulation  -continue Apixaban 5 mg     Class 2 severe obesity due to excess calories with serious comorbidity and body mass index (BMI) of 38.0 to 38.9 in adult  -encouraged weight loss  -discussed health risk associated w obesity   Body mass index is 38.88 kg/m². Morbid obesity complicates all aspects of disease management from diagnostic modalities to treatment. Weight loss encouraged and health benefits explained to patient.   -5 pound weight gain since last visit     Prediabetes  -followed by PCP  -A1c 6.2 9/18/24     MEHNAZ (obstructive sleep apnea)  -not currently on CPAP   -refer to sleep medicine to evaluate for sleep apnea update            Total duration of face to face visit time 30 minutes.  Total time spent counseling greater than fifty percent of total visit  time.  Counseling included discussion regarding imaging findings, diagnosis, possibilities, treatment options, risks and benefits.  The patient had many questions regarding the options and long-term effects      Miki Salgado DNP  Cardiology